# Patient Record
Sex: FEMALE | Race: WHITE | NOT HISPANIC OR LATINO | Employment: UNEMPLOYED | URBAN - METROPOLITAN AREA
[De-identification: names, ages, dates, MRNs, and addresses within clinical notes are randomized per-mention and may not be internally consistent; named-entity substitution may affect disease eponyms.]

---

## 2017-03-02 ENCOUNTER — TELEPHONE (OUTPATIENT)
Dept: FAMILY MEDICINE | Facility: CLINIC | Age: 52
End: 2017-03-02

## 2017-03-02 NOTE — TELEPHONE ENCOUNTER
Writer asked by Dr. Koenig to contact patient regarding tomorrow's appt:  1. Dr. Koenig is happy to see patient and give referral to psych but Dr. Koenig does not prescribe Klonopin  2. Dr. Koenig would like records from previous provider(s) who prescribed Klonopin faxed to her    Called patient's home number and person who answered phone informed writer patient is staying at her mother's and writer may try to reach her there: [phone].    Called that number and patient answered phone.    Writer reviewed message per Dr. Koenig.    Patient verbalized understanding and stated:  1. She has enough Klonopin  2. What she needs refill of is Paxil to get her through until she can see psychiatry  3. Unsure if she will be able to get records transferred to Dr. Koenig prior to tomorrow's appt.  Was informed Dr. Koenig could probably look records up during office visit after she signs release    Writer explained to patient, yes, if previous health system is part of Care Everywhere, could likely access those records but Dr. Koenig would prefer records prior to appointment for review.    Reviewed clinic fax number with patient.    Dr. Arya Hilario, FBAIOLAN, RN

## 2017-03-03 ENCOUNTER — OFFICE VISIT (OUTPATIENT)
Dept: FAMILY MEDICINE | Facility: CLINIC | Age: 52
End: 2017-03-03
Payer: COMMERCIAL

## 2017-03-03 ENCOUNTER — OFFICE VISIT (OUTPATIENT)
Dept: BEHAVIORAL HEALTH | Facility: CLINIC | Age: 52
End: 2017-03-03
Payer: COMMERCIAL

## 2017-03-03 VITALS
RESPIRATION RATE: 16 BRPM | OXYGEN SATURATION: 96 % | SYSTOLIC BLOOD PRESSURE: 126 MMHG | DIASTOLIC BLOOD PRESSURE: 84 MMHG | HEART RATE: 99 BPM | TEMPERATURE: 99.1 F | HEIGHT: 62 IN | WEIGHT: 125.25 LBS | BODY MASS INDEX: 23.05 KG/M2

## 2017-03-03 DIAGNOSIS — F41.0 PANIC DISORDER WITHOUT AGORAPHOBIA: ICD-10-CM

## 2017-03-03 DIAGNOSIS — F17.200 TOBACCO USE DISORDER: ICD-10-CM

## 2017-03-03 DIAGNOSIS — Z23 NEED FOR TDAP VACCINATION: ICD-10-CM

## 2017-03-03 DIAGNOSIS — F33.9 RECURRENT MAJOR DEPRESSIVE DISORDER, REMISSION STATUS UNSPECIFIED (H): Primary | ICD-10-CM

## 2017-03-03 DIAGNOSIS — F34.1 CHRONIC DEPRESSIVE PERSONALITY DISORDER: ICD-10-CM

## 2017-03-03 DIAGNOSIS — F42.9 OCD (OBSESSIVE COMPULSIVE DISORDER): Primary | ICD-10-CM

## 2017-03-03 PROBLEM — F41.1 GAD (GENERALIZED ANXIETY DISORDER): Status: ACTIVE | Noted: 2017-03-03

## 2017-03-03 PROCEDURE — 99207 ZZC NO CHARGE BEHAVIORAL WARM HANDOFF: CPT | Performed by: SOCIAL WORKER

## 2017-03-03 PROCEDURE — 99204 OFFICE O/P NEW MOD 45 MIN: CPT | Performed by: FAMILY MEDICINE

## 2017-03-03 RX ORDER — PAROXETINE 20 MG/1
20 TABLET, FILM COATED ORAL AT BEDTIME
Qty: 90 TABLET | Refills: 0 | Status: ON HOLD | OUTPATIENT
Start: 2017-03-03 | End: 2019-01-10

## 2017-03-03 RX ORDER — CLONAZEPAM 0.5 MG/1
0.5 TABLET ORAL DAILY
Qty: 20 TABLET | Refills: 0 | COMMUNITY
Start: 2017-03-03 | End: 2017-03-23

## 2017-03-03 ASSESSMENT — ANXIETY QUESTIONNAIRES
6. BECOMING EASILY ANNOYED OR IRRITABLE: NOT AT ALL
3. WORRYING TOO MUCH ABOUT DIFFERENT THINGS: NOT AT ALL
IF YOU CHECKED OFF ANY PROBLEMS ON THIS QUESTIONNAIRE, HOW DIFFICULT HAVE THESE PROBLEMS MADE IT FOR YOU TO DO YOUR WORK, TAKE CARE OF THINGS AT HOME, OR GET ALONG WITH OTHER PEOPLE: NOT DIFFICULT AT ALL
GAD7 TOTAL SCORE: 0
5. BEING SO RESTLESS THAT IT IS HARD TO SIT STILL: NOT AT ALL
1. FEELING NERVOUS, ANXIOUS, OR ON EDGE: NOT AT ALL
7. FEELING AFRAID AS IF SOMETHING AWFUL MIGHT HAPPEN: NOT AT ALL
2. NOT BEING ABLE TO STOP OR CONTROL WORRYING: NOT AT ALL

## 2017-03-03 ASSESSMENT — PATIENT HEALTH QUESTIONNAIRE - PHQ9: 5. POOR APPETITE OR OVEREATING: NOT AT ALL

## 2017-03-03 NOTE — PROGRESS NOTES
"  SUBJECTIVE:                                                    Gwen Cash is a 52 year old female who presents to clinic today for the following health issues:    Depression and Anxiety Follow-Up    Status since last visit: Patient is new to us here at [facility]. Patient has been previously getting care from [facility] . It used to be Dr. Jillian MD who retired in 04/2016  at [employer].    Since last visit with Dr. Jillian MD patient has had no changes it has been the same     Other associated symptoms:None/ Patient recently is mourning the loss of a close relative    Complicating factors:     Significant life event: Yes-  Loss of a close relative     Current substance abuse: None    PHQ-9 SCORE 3/3/2017   Total Score 0     AQUILINO-7 SCORE 3/3/2017   Total Score 0        PHQ-9  English      PHQ-9   Any Language     GAD7     Reports has the OCD type of depression and anxiety. Long standing diagnosed in the 1990's.   Hospitalized 2 to 3 times for it. Never attempted suicide , last hospitalized in 2004.   Had obsessions about hemorrhaging and dying. She also had been sleeping excessively and noted a decrease in energy. She lost three jobs because of inability to get up on time. Subsequently she was hospitalized on July 14th until the end of August 2004 at [facility]. She had stopped eating. She had become increasingly engrossed in her obsessions about hemorrhaging. Menses had regularized.      Notes from 2004 discharge state \"This woman was admitted to the closed, locked psychiatric section for depression, inability to focus, poor concentration, and failure to thrive.  She was treated with group therapy, individual therapy, occupational therapy, and borrero activities.  For much of the hospitalization, she was distressed, in significant despair, felt hopelessness, and the inability to cope.  She was tried on several different medications, none of which seemed very helpful for her.  She had " "multiple symptoms of side effects from many different medications at small doses.  She was seen by neurology for paresthesias of her mouth, and they   thought this was hyperventilation.  Her EEG and MRI turned out to be normal.  At time of discharge, she was taking Risperdal 0.5 mg at bedtime, Klonopin 0.5 mg in the morning and bedtime, and she was prescribed Cymbalta 30 mg, but was hesitant to take this because of side effects.  She would be seen by her outpatient physician for medication management.  She was not interested in day hospital or outpatient psychotherapy. Chemistry screening profile was all within the normal limits.  Drugs of abuse were negative on screen.  Urinalysis was clear. Hematology was all within the normal limits.  MRI of the brain was a normal scan.  DISCHARGE DIAGNOSES  AXIS I:  1.  Depression, severe, chronic, recurrent. 2.  Anxiety, severe, recurrent.\"    Per her regular psychiatrist notes Her anxiety improved in the hospital and the Paxil was actually cut back to 20 mg.    Last visit with Dr Walsh her psychiatrist at [facility] was 1/2016  Before he retired. He stated.\"She continues to be free of obsessions and compulsions and panic. She takes Paxil 20 mg regularly together with Klonopin 0.5 mg every afternoon. We note that she had hives with generic Paxil so it is written d.a.w. we also note that while hospitalized in 2004 she went off Klonopin, possibly also the Paxil, for 3-4 weeks and experienced a heightened level of very uncomfortable anxiety. She's been back on the Paxil and Klonopin at current doses ever since then  history of very severe anxiety in the context of hyperemesis gravidarum 21 years ago with subsequent OCD symptoms, all of which are in long-term remission with low-dose Paxil and Klonopin. One attempt to take her off the Klonopin (possibly also the Paxil) he'll did excessive anxiety so she has remained on both drugs. She has not overused any of them. Because of " "her history of severe anxiety it would be well for her to be followed by psychiatrist even though she is stable\"     Has been stable on Paxil 20 mg brand name only & clonazepam 0.5 mg daily many years. Didn't have regular medical primary care , would go in for urgent things ,unsure about last pap etc. Will come back for that at a later time. Currently just wants her mental health addressed. Was under care long time with dr garner psychiatrist at [facility] at point when would just see him once a year in April. But he retired since she last saw him and was assigned another provider. A series of events occurred and aunt  tried to reschedule didn't call on time reported as no show and denied further care. This was unexpected. Called live nurse on medica choice and made apt for today to get referral to a psychiatrist. Declines need for therapist. Ran out of Paxil 2 days ago, despite aunt passing PHQ9 & shira =0 each as stable on med's. Has some klonopin left and does not need that refilled . Hopes to get in with psyche soon.     Looks like PA for brand name Paxil went through to [facility]'s on 17 via [facility] but was only given a month till could establish with new BH person.     On care every where \"This patient's Psychiatric care has been terminated on 2017 due to failure to keep appointments and failed intake. Please direct the patient to the Customer Service number on the back of their insurance card for other options outside of [place]\"    Reports hx of kidney stone in . Last had like a physical then . May have had a pap later. She signed a release and will review records on care everywhere later after apt when have more time.     Declines labs or tdap today will come back for a preventive visit.     Is currently unemployed, not disabled from  Her mental illness. Lives with  and 2 of her 3 grown children.currently spends most of her days at her mothers helping her as " mother not doing so well & forgetful.       Amount of exercise or physical activity: walking around and activities like that    Problems taking medications regularly: No/ Patient just ended up running out of Paxil and has missed for two day .     Medication side effects: patient did have a side effect to generic Paxil so patient is requesting a JABARI    Patient broke out in hives to the generic Paxil  Diet: regular (no restrictions)    Patient would like a referral to Mental Health  Also asking about natural foods that would help with pre-menopause/menopause symptoms     Problem list and histories reviewed & adjusted, as indicated.  Additional history: as documented    Patient Active Problem List   Diagnosis     OCD (obsessive compulsive disorder)     Panic disorder without agoraphobia     Tobacco use disorder     Chronic depressive personality disorder     Past Surgical History   Procedure Laterality Date      section       x 3     Lithotripsy         Social History   Substance Use Topics     Smoking status: Current Every Day Smoker     Packs/day: 0.50     Years: 30.00     Smokeless tobacco: Never Used     Alcohol use No     Family History   Problem Relation Age of Onset     Coronary Artery Disease Mother      Cataracts Mother      Alcoholism Father      alcohol cardiomyopathy     Alcoholism Brother      Arthritis Brother      Depression Brother      Coronary Artery Disease Brother      Hypertension Brother          Current Outpatient Prescriptions   Medication Sig Dispense Refill     PARoxetine (PAXIL) 20 MG tablet Take 1 tablet (20 mg) by mouth At Bedtime 90 tablet 0     clonazePAM (KLONOPIN) 0.5 MG tablet Take 1 tablet (0.5 mg) by mouth daily 20 tablet 0     Allergies   Allergen Reactions     Metronidazole Unknown     Abstracted data , patient cannot remember      Penicillins Unknown     Abstracted data , patient cannot remember      No lab results found.   BP Readings from Last 3 Encounters:  "  03/03/17 126/84    Wt Readings from Last 3 Encounters:   03/03/17 125 lb 4 oz (56.8 kg)         Labs reviewed in EPIC    Reviewed and updated as needed this visit by clinical staff  Tobacco  Allergies  Meds  Problems  Med Hx  Surg Hx  Fam Hx  Soc Hx        Reviewed and updated as needed this visit by Provider  Tobacco  Allergies  Meds  Problems  Soc Hx        ROS:  C: NEGATIVE for fever, chills, change in weight  I: NEGATIVE for worrisome rashes, moles or lesions  E: NEGATIVE for vision changes or irritation  E/M: NEGATIVE for ear, mouth and throat problems  R: NEGATIVE for significant cough or SOB  B: NEGATIVE for masses, tenderness or discharge  CV: NEGATIVE for chest pain, palpitations or peripheral edema  GI: NEGATIVE for nausea, abdominal pain, heartburn, or change in bowel habits  : NEGATIVE for frequency, dysuria, or hematuria  M: NEGATIVE for significant arthralgias or myalgia  N: NEGATIVE for weakness, dizziness or paresthesias  E: NEGATIVE for temperature intolerance, skin/hair changes  H: NEGATIVE for bleeding problems  P: NEGATIVE for changes in mood or affect    OBJECTIVE:                                                    /84  Pulse 99  Temp 99.1  F (37.3  C) (Tympanic)  Resp 16  Ht 5' 2.25\" (1.581 m)  Wt 125 lb 4 oz (56.8 kg)  SpO2 96%  BMI 22.72 kg/m2  Body mass index is 22.72 kg/(m^2).  GENERAL: healthy, alert and no distress  EYES: Eyes grossly normal to inspection, PERRL and conjunctivae and sclerae normal  HENT: ear canals and TM's normal, nose and mouth without ulcers or lesions  NECK: no adenopathy, no asymmetry, masses, or scars and thyroid normal to palpation  RESP: lungs clear to auscultation - no rales, rhonchi or wheezes  CV: regular rate and rhythm, normal S1 S2, no S3 or S4, no murmur, click or rub, no peripheral edema and peripheral pulses strong  ABDOMEN: soft, non tender, no hepatosplenomegaly, no masses and bowel sounds normal  MS: no gross musculoskeletal " defects noted, no edema  SKIN: no suspicious lesions or rashes  NEURO: Normal strength and tone, mentation intact and speech normal  PSYCH: mentation appears normal, affect normal/bright  Gait and station are normal. No abnormalities of muscle strength or tone are appreciated Speech normal in rate and rhythm. Oriented x3.Thought process logical. Associations intact. Attention and concentration normal. Mood and affect euthymic, no hallucinations, no delusions. No suicidal ideation.     Diagnostic Test Results:  No results found for this or any previous visit (from the past 24 hour(s)).     ASSESSMENT/PLAN:                                                      1. OCD (obsessive compulsive disorder)  New to me , seen once in 2004 for severe depression and admitted, not seen here since. Limited apt time and unable to review any records prior to apt. Did [facility] in detail [facility] via care everywhere from her psychiatrist and sporadic primary and gyn visits has had prior C sections, under care of gyn in past for menometrorrhagia. [place] prescription monitoring review shows has been receiving klonopin # 30 monthly last on 2/7 by a dr isabell Chisholm. Here to establish care and get Paxil refilled until can be seen by a psychiatrist. Patient signed a release so we could get records, these were reviewed after the visit in detail. Declines Labs today will do in future. Consider tdap.Return for preventive with mammogram and pap etc. Wants to wait till issue with psychiatric care all taken care of first. counseled I don T prescribe chronic benzo's can give temp if need to if delay in getting in to psychiatrist. Will refer to psychiatrist so they can take better care of you and take over all your mental health med management including Paxil. She continues to be free of obsessions and compulsions and panic. She takes Paxil 20 mg regularly together with Klonopin 0.5 mg every afternoon. She had hives with generic Paxil so  it is written d.a.w. History of very severe anxiety in the context of hyperemesis gravidarum in 1996 with subsequent OCD symptoms, all of which are in long-term remission with low-dose Paxil and Klonopin. One attempt to take her off the Klonopin (possibly also the Paxil while hospitalized in 2004 caused excessive anxiety so she has remained on both drugs ever since. She has not overused any of them. Because of her history of severe anxiety her previous psychiatrist suggested it be well for her to be followed by psychiatrist even though she is stable    - MENTAL HEALTH REFERRAL  - PARoxetine (PAXIL) 20 MG tablet; Take 1 tablet (20 mg) by mouth At Bedtime  Dispense: 90 tablet; Refill: 0  - clonazePAM (KLONOPIN) 0.5 MG tablet; Take 1 tablet (0.5 mg) by mouth daily  Dispense: 20 tablet; Refill: 0  - MENTAL HEALTH REFERRAL    2. Panic disorder without agoraphobia  As above  - MENTAL HEALTH REFERRAL  - clonazePAM (KLONOPIN) 0.5 MG tablet; Take 1 tablet (0.5 mg) by mouth daily  Dispense: 20 tablet; Refill: 0  - MENTAL HEALTH REFERRAL    3. Chronic depressive personality disorder  PHq9 & Shiv = 0     4. Tobacco use disorder  Continues to smoke not ready to quit yet.     5. Need for Tdap vaccination  Declines today plans to get with labs at a preventive visit in the future    See Patient Instructions    Graciela Koenig MD  [facility]

## 2017-03-03 NOTE — PROGRESS NOTES
Warm-handoff      Patient had appointment with his/her primary care physician. [facility] services were requested. Explained the role of the [facility] and provided informational handout and contact information for the [facility].   The patient is establishing care at the [email or web address] The patient has a chronic history of depression. [facility] services were requested. Patient reports she has worked with a psychiatrist at [facility] for over 20 years but was terminated recently due to 2 failed appointments. Patient reports one of them was due to the  of an aunt. Patient reports she is currently stable at the present time. Patient's PHQ-9 and AQUILINO 7 was 0. Provide information to patient about the IB model and the [facility]. Provided patient [facility] card for further resources or counseling.

## 2017-03-03 NOTE — PATIENT INSTRUCTIONS
Sign a release so we can get records  Labs in future   Consider tdap  Return for preventive with mammogram and pap etc  I don t prescribe chronic benzos can give temp if need to if delay in getting in to psychiatrist   Will refer to psychiatrist so they can take better care of you and take over all your mental health med management including paxil

## 2017-03-03 NOTE — MR AVS SNAPSHOT
"              After Visit Summary   3/3/2017    Gwen Cash    MRN: [id number]           Patient Information     Date Of Birth          1965        Visit Information        Provider Department      3/3/2017 3:43 PM [facility]        Today's Diagnoses     Recurrent major depressive disorder, remission status unspecified (H)    -  1       Follow-ups after your visit        Who to contact     If you have questions or need follow up information about today's clinic visit or your schedule please contact [facility] directly at [phone].  Normal or non-critical lab and imaging results will be communicated to you by FitLinxxhart, letter or phone within 4 business days after the clinic has received the results. If you do not hear from us within 7 days, please contact the clinic through FitLinxxhart or phone. If you have a critical or abnormal lab result, we will notify you by phone as soon as possible.  Submit refill requests through Imagimod or call your pharmacy and they will forward the refill request to us. Please allow 3 business days for your refill to be completed.          Additional Information About Your Visit        MyChart Information     Imagimod lets you send messages to your doctor, view your test results, renew your prescriptions, schedule appointments and more. To sign up, go to [email or web address] . Click on \"Log in\" on the left side of the screen, which will take you to the Welcome page. Then click on \"Sign up Now\" on the right side of the page.     You will be asked to enter the access code listed below, as well as some personal information. Please follow the directions to create your username and password.     Your access code is: [email or web address]  Expires: 2017  3:26 PM     Your access code will  in 90 days. If you need help or a new code, please call your [facility] or [phone].        Care EveryWhere ID     This is your Care " EveryWhere ID. This could be used by other organizations to access your [place] medical records  [id number]         Blood Pressure from Last 3 Encounters:   03/03/17 126/84    Weight from Last 3 Encounters:   03/03/17 56.8 kg (125 lb 4 oz)              Today, you had the following     No orders found for display         Today's Medication Changes          These changes are accurate as of: 3/3/17  3:45 PM.  If you have any questions, ask your nurse or doctor.               Start taking these medicines.        Dose/Directions    PARoxetine 20 MG tablet   Commonly known as:  PAXIL   Used for:  AQUILINO (generalized anxiety disorder)   Started by:  Graciela Koenig MD        Dose:  20 mg   Take 1 tablet (20 mg) by mouth At Bedtime   Quantity:  90 tablet   Refills:  0            Where to get your medicines      These medications were sent to [facility] [address] [place] [address] AT [address] [address]    Hours:  24-hours Phone:  [phone]     PARoxetine 20 MG tablet                Primary Care Provider    None Specified       No primary provider on file.        Thank you!     Thank you for choosing [facility]  for your care. Our goal is always to provide you with excellent care. Hearing back from our patients is one way we can continue to improve our services. Please take a few minutes to complete the written survey that you may receive in the mail after your visit with us. Thank you!             Your Updated Medication List - Protect others around you: Learn how to safely use, store and throw away your medicines at www.disposemymeds.org.          This list is accurate as of: 3/3/17  3:45 PM.  Always use your most recent med list.                   Brand Name Dispense Instructions for use    clonazePAM 0.5 MG tablet    klonoPIN    20 tablet    Take 1 tablet (0.5 mg) by mouth daily       PARoxetine 20 MG tablet    PAXIL    90  tablet    Take 1 tablet (20 mg) by mouth At Bedtime

## 2017-03-03 NOTE — MR AVS SNAPSHOT
After Visit Summary   3/3/2017    Gwen Cash    MRN: [id number]           Patient Information     Date Of Birth          1965        Visit Information        Provider Department      3/3/2017 2:40 PM Graciela Koenig MD [facility]        Today's Diagnoses     Recurrent major depressive disorder, remission status unspecified (H)    -  1    AQUILINO (generalized anxiety disorder)        Need for Tdap vaccination          Care Instructions    Sign a release so we can get records  Labs in future   Consider tdap  Return for preventive with mammogram and pap etc  I don t prescribe chronic benzos can give temp if need to if delay in getting in to psychiatrist   Will refer to psychiatrist so they can take better care of you and take over all your mental health med management including paxil        Follow-ups after your visit        Additional Services     MENTAL HEALTH REFERRAL       Your provider has referred you to: Behavioral Healthcare Providers Intake Scheduling [phone]   [email or web address] [email or web address] for med management     All scheduling is subject to the client's specific insurance plan & benefits, provider/location availability, and provider clinical specialities.  Please arrive 15 minutes early for your first appointment and bring your completed paperwork.    Please be aware that coverage of these services is subject to the terms and limitations of your health insurance plan.  Call member services at your health plan with any benefit or coverage questions.            MENTAL HEALTH REFERRAL       Your provider has referred you to: Behavioral Healthcare Providers Intake Scheduling [phone]   [email or web address] [email or web address] needs community psychiatrist   Please trae her mothers number as currently helping mom out   Number is [phone]     All scheduling is subject to the client's specific insurance plan & benefits, provider/location availability, and provider clinical  "specialities.  Please arrive 15 minutes early for your first appointment and bring your completed paperwork.    Please be aware that coverage of these services is subject to the terms and limitations of your health insurance plan.  Call member services at your health plan with any benefit or coverage questions.                  Who to contact     If you have questions or need follow up information about today's clinic visit or your schedule please contact [facility] directly at [phone].  Normal or non-critical lab and imaging results will be communicated to you by [email or web address], letter or phone within 4 business days after the clinic has received the results. If you do not hear from us within 7 days, please contact the clinic through [facility] or phone. If you have a critical or abnormal lab result, we will notify you by phone as soon as possible.  Submit refill requests through SysClass or call your pharmacy and they will forward the refill request to us. Please allow 3 business days for your refill to be completed.          Additional Information About Your Visit        SysClass Information     SysClass lets you send messages to your doctor, view your test results, renew your prescriptions, schedule appointments and more. To sign up, go to [email or web address] . Click on \"Log in\" on the left side of the screen, which will take you to the Welcome page. Then click on \"Sign up Now\" on the right side of the page.     You will be asked to enter the access code listed below, as well as some personal information. Please follow the directions to create your username and password.     Your access code is: [email or web address]  Expires: 2017  3:26 PM     Your access code will  in 90 days. If you need help or a new code, please call your [facility] clinic or [phone].        Care EveryWhere ID     This is your Care EveryWhere ID. This could be used by other organizations to access your [facility] medical " "records  [id number]        Your Vitals Were     Pulse Temperature Respirations Height Pulse Oximetry BMI (Body Mass Index)    99 99.1  F (37.3  C) (Tympanic) 16 5' 2.25\" (1.581 m) 96% 22.72 kg/m2       Blood Pressure from Last 3 Encounters:   03/03/17 126/84    Weight from Last 3 Encounters:   03/03/17 125 lb 4 oz (56.8 kg)              We Performed the Following     MENTAL HEALTH REFERRAL     MENTAL HEALTH REFERRAL          Today's Medication Changes          These changes are accurate as of: 3/3/17  3:27 PM.  If you have any questions, ask your nurse or doctor.               Start taking these medicines.        Dose/Directions    PARoxetine 20 MG tablet   Commonly known as:  PAXIL   Used for:  AQUILINO (generalized anxiety disorder)   Started by:  Graciela Koenig MD        Dose:  20 mg   Take 1 tablet (20 mg) by mouth At Bedtime   Quantity:  90 tablet   Refills:  0            Where to get your medicines      These medications were sent to [facility] [address] [place] [address] AT [address] [address]    Hours:  24-hours Phone:  [phone]     PARoxetine 20 MG tablet                Primary Care Provider    None Specified       No primary provider on file.        Thank you!     Thank you for choosing [facility]  for your care. Our goal is always to provide you with excellent care. Hearing back from our patients is one way we can continue to improve our services. Please take a few minutes to complete the written survey that you may receive in the mail after your visit with us. Thank you!             Your Updated Medication List - Protect others around you: Learn how to safely use, store and throw away your medicines at www.disposemymeds.org.          This list is accurate as of: 3/3/17  3:27 PM.  Always use your most recent med list.                   Brand Name Dispense Instructions for use    clonazePAM 0.5 MG tablet    " klonoPIN    20 tablet    Take 1 tablet (0.5 mg) by mouth daily       PARoxetine 20 MG tablet    PAXIL    90 tablet    Take 1 tablet (20 mg) by mouth At Bedtime

## 2017-03-04 PROBLEM — F17.200 TOBACCO USE DISORDER: Status: ACTIVE | Noted: 2017-03-04

## 2017-03-04 PROBLEM — F42.9 OCD (OBSESSIVE COMPULSIVE DISORDER): Status: ACTIVE | Noted: 2017-03-04

## 2017-03-04 PROBLEM — F41.0 PANIC DISORDER WITHOUT AGORAPHOBIA: Status: ACTIVE | Noted: 2017-03-04

## 2017-03-04 ASSESSMENT — PATIENT HEALTH QUESTIONNAIRE - PHQ9: SUM OF ALL RESPONSES TO PHQ QUESTIONS 1-9: 0

## 2017-03-04 ASSESSMENT — ANXIETY QUESTIONNAIRES: GAD7 TOTAL SCORE: 0

## 2017-03-06 ENCOUNTER — TELEPHONE (OUTPATIENT)
Dept: FAMILY MEDICINE | Facility: CLINIC | Age: 52
End: 2017-03-06

## 2017-03-06 NOTE — TELEPHONE ENCOUNTER
----- Message from Caroline Harrison sent at 3/6/2017 10:25 AM CST -----  Regarding: OUTPATIENT MENTAL HEALTH ORDER  An attempt has been made to contact the patient. A letter has been sent advising the patient to contact [facility] due to the patient not having a correct phone number in EPIC or working messaging system.    Caroline CASTRO  , [facility]

## 2017-03-23 ENCOUNTER — TELEPHONE (OUTPATIENT)
Dept: FAMILY MEDICINE | Facility: CLINIC | Age: 52
End: 2017-03-23

## 2017-03-23 DIAGNOSIS — F42.9 OCD (OBSESSIVE COMPULSIVE DISORDER): ICD-10-CM

## 2017-03-23 DIAGNOSIS — F41.0 PANIC DISORDER WITHOUT AGORAPHOBIA: ICD-10-CM

## 2017-03-23 RX ORDER — CLONAZEPAM 0.5 MG/1
0.5 TABLET ORAL DAILY
Qty: 15 TABLET | Refills: 0 | Status: ON HOLD | OUTPATIENT
Start: 2017-03-23 | End: 2019-01-10

## 2017-03-23 NOTE — TELEPHONE ENCOUNTER
Will give 15 pills of klonopin. No further refills. Was told to call and make apt with psyche when seen 3/3

## 2017-03-23 NOTE — TELEPHONE ENCOUNTER
The patient is calling to request a Klonopin prescription from Dr. Koenig. The patient has yet to schedule with psychiatry but is planning on calling them soon.  The patient is staying with her mother and can be reached at .

## 2017-03-23 NOTE — TELEPHONE ENCOUNTER
Dr. Koenig,  --I called patient and gave her 's message.  --Patient says she does not know how fast she will be able to get into psych because there was some sort of mix up with them trying to reach her.  --She is concerned she will not be able to get into  psych before her klonopin runs out.  --She is on phone now trying to schedule with psych.  --I told her that refills for controlled medications are usually given in office visit only.    --She wants to know where she should  her Klonopin.  --I see it was printed but I don't see the pharmacy.    --MA - please let patient know when script moved to pharmacy.    Yadira Nelson RN

## 2017-06-02 ENCOUNTER — TELEPHONE (OUTPATIENT)
Dept: FAMILY MEDICINE | Facility: CLINIC | Age: 52
End: 2017-06-02

## 2017-06-02 NOTE — TELEPHONE ENCOUNTER
Attempt #2    Looks like pt phone is incorrect. Will be sending a letter for her instead.   Fanta Small MA

## 2017-06-02 NOTE — LETTER
[address] [address]  Phone: [phone]    May 24, 2017      Gwen Cash  [address] [facility] [address]      Dear Gwen Cash:    As part of [facility]'s commitment to health and wellness, we inform our patient when records indicate the need for specific health screening.  Please review the following health screening recommendations based on your age:                 Ages 20-40:  Annual physical that includes a breast exam, pelvic exam and possibly a pap smear and other lab work.                 Ages 40-75:  Annual physical that includes a breast exam, pelvic exam and possibly a pap smear and other lab work.  You also need to obtain a screening mammogram every year starting at age 40. At age 50, it is recommended that you be screened for colon cancer with a colonoscopy.  If your initial colonoscopy is negative, you probably won't need another one for 10 years.    Please bring a list of your current medications with you to your appointment.  If you will need refills prior to your appointment, please have your pharmacy fax a refill request to the appropriate provider; this helps to ensure that the correct medication and dose are ordered.    To schedule an appointment with a [facility]'s  physician or nurse practitioner, please call:[phone].     NOTE:  Please disregard this notice if you have already scheduled your health maintenance exam(s) or if you have been given different instructions from your health care provider.         Sincerely,    [facility] Health Care Team

## 2018-11-16 ENCOUNTER — HOSPITAL ENCOUNTER (INPATIENT)
Facility: CLINIC | Age: 53
LOS: 56 days | Discharge: HOME | DRG: 885 | End: 2019-01-11
Attending: EMERGENCY MEDICINE | Admitting: PSYCHIATRY & NEUROLOGY
Payer: COMMERCIAL

## 2018-11-16 DIAGNOSIS — F42.9 OCD (OBSESSIVE COMPULSIVE DISORDER): Primary | ICD-10-CM

## 2018-11-16 DIAGNOSIS — F42.2 MIXED OBSESSIONAL THOUGHTS AND ACTS: ICD-10-CM

## 2018-11-16 DIAGNOSIS — F41.0 PANIC DISORDER WITHOUT AGORAPHOBIA: ICD-10-CM

## 2018-11-16 DIAGNOSIS — F34.1 CHRONIC DEPRESSIVE PERSONALITY DISORDER: ICD-10-CM

## 2018-11-16 DIAGNOSIS — E03.9 HYPOTHYROIDISM, UNSPECIFIED TYPE: ICD-10-CM

## 2018-11-16 DIAGNOSIS — F33.2 SEVERE RECURRENT MAJOR DEPRESSION WITHOUT PSYCHOTIC FEATURES (H): ICD-10-CM

## 2018-11-16 DIAGNOSIS — F32.9 MAJOR DEPRESSIVE DISORDER, REMISSION STATUS UNSPECIFIED, UNSPECIFIED WHETHER RECURRENT: ICD-10-CM

## 2018-11-16 PROBLEM — F32.A DEPRESSION: Status: ACTIVE | Noted: 2018-11-16

## 2018-11-16 LAB
ALBUMIN SERPL-MCNC: 4.9 G/DL (ref 3.4–5)
ALBUMIN UR-MCNC: NEGATIVE MG/DL
ALP SERPL-CCNC: 74 U/L (ref 40–150)
ALT SERPL W P-5'-P-CCNC: 17 U/L (ref 0–50)
ANION GAP SERPL CALCULATED.3IONS-SCNC: 9 MMOL/L (ref 3–14)
APPEARANCE UR: CLEAR
AST SERPL W P-5'-P-CCNC: 11 U/L (ref 0–45)
BASOPHILS # BLD AUTO: 0.1 10E9/L (ref 0–0.2)
BASOPHILS NFR BLD AUTO: 1.3 %
BILIRUB SERPL-MCNC: 0.4 MG/DL (ref 0.2–1.3)
BILIRUB UR QL STRIP: NEGATIVE
BUN SERPL-MCNC: 6 MG/DL (ref 7–30)
CALCIUM SERPL-MCNC: 9.5 MG/DL (ref 8.5–10.1)
CHLORIDE SERPL-SCNC: 103 MMOL/L (ref 94–109)
CO2 SERPL-SCNC: 29 MMOL/L (ref 20–32)
COLOR UR AUTO: NORMAL
CREAT SERPL-MCNC: 0.72 MG/DL (ref 0.52–1.04)
DIFFERENTIAL METHOD BLD: ABNORMAL
EOSINOPHIL # BLD AUTO: 0.1 10E9/L (ref 0–0.7)
EOSINOPHIL NFR BLD AUTO: 1.7 %
ERYTHROCYTE [DISTWIDTH] IN BLOOD BY AUTOMATED COUNT: 12.6 % (ref 10–15)
GFR SERPL CREATININE-BSD FRML MDRD: 84 ML/MIN/1.7M2
GLUCOSE SERPL-MCNC: 103 MG/DL (ref 70–99)
GLUCOSE UR STRIP-MCNC: NEGATIVE MG/DL
HCG UR QL: NEGATIVE
HCT VFR BLD AUTO: 46.3 % (ref 35–47)
HGB BLD-MCNC: 15.9 G/DL (ref 11.7–15.7)
HGB UR QL STRIP: NEGATIVE
IMM GRANULOCYTES # BLD: 0 10E9/L (ref 0–0.4)
IMM GRANULOCYTES NFR BLD: 0.1 %
KETONES UR STRIP-MCNC: NEGATIVE MG/DL
LEUKOCYTE ESTERASE UR QL STRIP: NEGATIVE
LYMPHOCYTES # BLD AUTO: 2.6 10E9/L (ref 0.8–5.3)
LYMPHOCYTES NFR BLD AUTO: 33.1 %
MAGNESIUM SERPL-MCNC: 2.4 MG/DL (ref 1.6–2.3)
MCH RBC QN AUTO: 30.6 PG (ref 26.5–33)
MCHC RBC AUTO-ENTMCNC: 34.3 G/DL (ref 31.5–36.5)
MCV RBC AUTO: 89 FL (ref 78–100)
MONOCYTES # BLD AUTO: 0.9 10E9/L (ref 0–1.3)
MONOCYTES NFR BLD AUTO: 11 %
NEUTROPHILS # BLD AUTO: 4.1 10E9/L (ref 1.6–8.3)
NEUTROPHILS NFR BLD AUTO: 52.8 %
NITRATE UR QL: NEGATIVE
NRBC # BLD AUTO: 0 10*3/UL
NRBC BLD AUTO-RTO: 0 /100
PH UR STRIP: 6.5 PH (ref 5–7)
PHOSPHATE SERPL-MCNC: 3.5 MG/DL (ref 2.5–4.5)
PLATELET # BLD AUTO: 338 10E9/L (ref 150–450)
POTASSIUM SERPL-SCNC: 3.8 MMOL/L (ref 3.4–5.3)
PROT SERPL-MCNC: 8.3 G/DL (ref 6.8–8.8)
RBC # BLD AUTO: 5.19 10E12/L (ref 3.8–5.2)
SODIUM SERPL-SCNC: 141 MMOL/L (ref 133–144)
SOURCE: NORMAL
SP GR UR STRIP: 1 (ref 1–1.03)
T4 FREE SERPL-MCNC: 0.93 NG/DL (ref 0.76–1.46)
TSH SERPL DL<=0.005 MIU/L-ACNC: 5.89 MU/L (ref 0.4–4)
UROBILINOGEN UR STRIP-MCNC: NORMAL MG/DL (ref 0–2)
WBC # BLD AUTO: 7.8 10E9/L (ref 4–11)

## 2018-11-16 PROCEDURE — 84443 ASSAY THYROID STIM HORMONE: CPT | Performed by: EMERGENCY MEDICINE

## 2018-11-16 PROCEDURE — 80053 COMPREHEN METABOLIC PANEL: CPT | Performed by: EMERGENCY MEDICINE

## 2018-11-16 PROCEDURE — 99284 EMERGENCY DEPT VISIT MOD MDM: CPT | Mod: Z6 | Performed by: EMERGENCY MEDICINE

## 2018-11-16 PROCEDURE — 90791 PSYCH DIAGNOSTIC EVALUATION: CPT

## 2018-11-16 PROCEDURE — 85025 COMPLETE CBC W/AUTO DIFF WBC: CPT | Performed by: EMERGENCY MEDICINE

## 2018-11-16 PROCEDURE — 84439 ASSAY OF FREE THYROXINE: CPT | Performed by: EMERGENCY MEDICINE

## 2018-11-16 PROCEDURE — 12400007 ZZH R&B MH INTERMEDIATE UMMC

## 2018-11-16 PROCEDURE — 99223 1ST HOSP IP/OBS HIGH 75: CPT | Mod: AI | Performed by: PSYCHIATRY & NEUROLOGY

## 2018-11-16 PROCEDURE — 99285 EMERGENCY DEPT VISIT HI MDM: CPT | Mod: 25

## 2018-11-16 PROCEDURE — 81003 URINALYSIS AUTO W/O SCOPE: CPT | Performed by: EMERGENCY MEDICINE

## 2018-11-16 PROCEDURE — 84100 ASSAY OF PHOSPHORUS: CPT | Performed by: EMERGENCY MEDICINE

## 2018-11-16 PROCEDURE — 83735 ASSAY OF MAGNESIUM: CPT | Performed by: EMERGENCY MEDICINE

## 2018-11-16 PROCEDURE — 25000132 ZZH RX MED GY IP 250 OP 250 PS 637: Performed by: EMERGENCY MEDICINE

## 2018-11-16 PROCEDURE — 81025 URINE PREGNANCY TEST: CPT | Performed by: EMERGENCY MEDICINE

## 2018-11-16 RX ORDER — GABAPENTIN 300 MG/1
300 CAPSULE ORAL 3 TIMES DAILY PRN
Status: DISCONTINUED | OUTPATIENT
Start: 2018-11-16 | End: 2019-01-11 | Stop reason: HOSPADM

## 2018-11-16 RX ORDER — HYDROXYZINE HYDROCHLORIDE 25 MG/1
25 TABLET, FILM COATED ORAL EVERY 4 HOURS PRN
Status: DISCONTINUED | OUTPATIENT
Start: 2018-11-16 | End: 2019-01-11 | Stop reason: HOSPADM

## 2018-11-16 RX ORDER — DOCUSATE SODIUM 100 MG/1
100 CAPSULE, LIQUID FILLED ORAL 2 TIMES DAILY PRN
Status: DISCONTINUED | OUTPATIENT
Start: 2018-11-16 | End: 2019-01-11 | Stop reason: HOSPADM

## 2018-11-16 RX ORDER — LEVOTHYROXINE SODIUM 25 UG/1
25 TABLET ORAL
Status: DISCONTINUED | OUTPATIENT
Start: 2018-11-17 | End: 2019-01-11 | Stop reason: HOSPADM

## 2018-11-16 RX ORDER — PAROXETINE 10 MG/1
20 TABLET, FILM COATED ORAL EVERY EVENING
Status: DISCONTINUED | OUTPATIENT
Start: 2018-11-16 | End: 2018-11-17

## 2018-11-16 RX ORDER — TRAZODONE HYDROCHLORIDE 50 MG/1
50 TABLET, FILM COATED ORAL
Status: DISCONTINUED | OUTPATIENT
Start: 2018-11-16 | End: 2019-01-11 | Stop reason: HOSPADM

## 2018-11-16 RX ORDER — LANOLIN ALCOHOL/MO/W.PET/CERES
3 CREAM (GRAM) TOPICAL AT BEDTIME
Status: DISCONTINUED | OUTPATIENT
Start: 2018-11-16 | End: 2018-11-16

## 2018-11-16 RX ORDER — CLONAZEPAM 0.5 MG/1
0.5 TABLET ORAL DAILY
Status: DISCONTINUED | OUTPATIENT
Start: 2018-11-16 | End: 2019-01-11 | Stop reason: HOSPADM

## 2018-11-16 RX ORDER — ACETAMINOPHEN 325 MG/1
325 TABLET ORAL EVERY 4 HOURS PRN
Status: DISCONTINUED | OUTPATIENT
Start: 2018-11-16 | End: 2018-12-10

## 2018-11-16 RX ORDER — RISPERIDONE 1 MG/1
1 TABLET ORAL AT BEDTIME
Status: DISCONTINUED | OUTPATIENT
Start: 2018-11-16 | End: 2018-11-19

## 2018-11-16 RX ORDER — MIRTAZAPINE 7.5 MG/1
7.5 TABLET, FILM COATED ORAL AT BEDTIME
Status: DISCONTINUED | OUTPATIENT
Start: 2018-11-16 | End: 2018-11-20

## 2018-11-16 RX ADMIN — CLONAZEPAM 0.5 MG: 0.5 TABLET ORAL at 16:51

## 2018-11-16 RX ADMIN — PAROXETINE HYDROCHLORIDE 20 MG: 10 TABLET, FILM COATED ORAL at 16:51

## 2018-11-16 ASSESSMENT — ACTIVITIES OF DAILY LIVING (ADL)
DRESS: INDEPENDENT
LAUNDRY: WITH SUPERVISION
ORAL_HYGIENE: INDEPENDENT
GROOMING: INDEPENDENT

## 2018-11-16 NOTE — PLAN OF CARE
Problem: Patient Care Overview  Goal: Team Discussion  Team Plan:   BEHAVIORAL TEAM DISCUSSION    Participants: Wei Powell MD, Janey FLORES and Constanza MCKEON RN   Progress: Initial behavioral team discussion.   Continued Stay Criteria/Rationale: Pt admitted voluntary due to family suggestion she seek help.   Medical/Physical: See medical consult, if applicable   Precautions:   Behavioral Orders   Procedures     Code 1 - Restrict to Unit     Routine Programming     As clinically indicated     Status 15     Every 15 minutes.     Plan: The plan is to assess the patient for mental health and medication needs.  The patient will be prescribed medications to treat the identified symptoms.  Upon discharge the patient will be referred to services as appropriate based on the assessment.  Rationale for change in precautions or plan: No change, initial plan of care.

## 2018-11-16 NOTE — PROGRESS NOTES
IN PATIENT ROOM: clothes, hair brush, toothbrush, 3 books, bottle of water    IN PATIENT LOCKER: CD, [place], slippers, tennis shoes, one hair brush, deodorant, body spray, fancy pen, beige winter coat, white gloves, cell phone with , 5 packs of cigarettes ( 2 are open), lighter, 's license, and Health insurance card    IN SECURITY: MEDICATIONS;  NO MONEY and NO CREDIT CARDS    ADMISSION:  I am responsible for any personal items that are not sent to the safe or pharmacy. [facility] is not responsible for loss, theft or damage of any property in my possession.    Patient Signature _____________________ Date/Time _____________________    Staff Signature _______________________ Date/Time _____________________  2nd Staff person, if patient is unable/unwilling to sign  ___________________________________ Date/Time _____________________  DISCHARGE:  All personal items have been returned to me.    Patient Signature _____________________ Date/Time _____________________    Staff Signature _______________________ Date/Time _____________________

## 2018-11-16 NOTE — PROGRESS NOTES
"CLINICAL NUTRITION SERVICES - ASSESSMENT NOTE     Nutrition Prescription    RECOMMENDATIONS FOR MDs/PROVIDERS TO ORDER:  Consider daily BMP/Mg/Phos x 3 days to ensure no refeeding occurs (given recent poor po intakes)    Malnutrition Status:    Severe malnutrition in the context of chronic illness    Recommendations already ordered by Registered Dietitian (RD):  Boost Plus/ meals    Future/Additional Recommendations:  Adjust supplements as needed pending pt preferences.     REASON FOR ASSESSMENT  Gwen Cash is a/an 53 year old female assessed by the dietitian for Provider Order - Patient has claimed to loose 30 lbs in the past couple months. Has been eating only one small meal a day. Would benefit from recommendations in increasing caloric intake    NUTRITION HISTORY  Chart review suggests reduced po intake with subsequent 30 lb weight loss since summer. Patient states she has had a reduced appetite lately and has not been eating much. Typical intake is 1 meal/day, however pt states this has been her pattern for some time. Just recently, her 1 meal has gotten smaller.    CURRENT NUTRITION ORDERS  Diet: Regular  Intake/Tolerance: Patient has been sleeping today, so she has not had breakfast or lunch. She is planning to eat dinner. She states she will be able to eat 3 meals tomorrow. She is agreeable to Boost Plus.    LABS  Labs reviewed  BUN 6 (L) - may be related to recent reduced protein intake    MEDICATIONS  Medications reviewed    ANTHROPOMETRICS  Height: 157.5 cm (5' 2\")  Most Recent Weight: 43.4 kg (95 lb 9.6 oz)    IBW: 59.8 kg  BMI: Underweight BMI <18.5  Weight History: UBW per pt of 125 lbs. She states that in Jan/Feb, she went to the doctor and was 119 lbs. This is when she realized she was losing weight. She feels weight loss has been more rapid in the past few months.  Wt Readings from Last 5 Encounters:   11/16/18 43.4 kg (95 lb 9.6 oz)   03/03/17 56.8 kg (125 lb 4 oz)     Dosing Weight: 43 " kg    ASSESSED NUTRITION NEEDS  Estimated Energy Needs: 6954-0033 kcals/day (30 - 35 kcals/kg )  Justification: Underweight  Estimated Protein Needs: 43-52 grams protein/day (1 - 1.2 grams of pro/kg)  Justification: Repletion  Estimated Fluid Needs: 1 mL/kcal or per MD goals   Justification: Maintenance    PHYSICAL FINDINGS  See malnutrition section below.     MALNUTRITION  % Intake: </=50% for >/= 1 month (severe)  % Weight Loss: > 20% in 1 year (severe)  Subcutaneous Fat Loss: Facial region:  moderate  Muscle Loss: Temporal, Facial & jaw region and Thoracic region (clavicle, acromium bone, deltoid, trapezius, pectoral):  All moderate/ severe  Fluid Accumulation/Edema: None noted  Malnutrition Diagnosis: Severe malnutrition in the context of chronic illness    NUTRITION DIAGNOSIS  Inadequate oral intake related to decreased appetite 2/2 altered mental status as evidenced by pt reports of poor po intake with subsequent weight loss.      INTERVENTIONS  Implementation  Nutrition Education: encouraged intake of regular meals to support adequate nutrition and weight repletion.   Medical food supplement therapy     Goals  Patient to consume % of nutritionally adequate meal trays TID, or the equivalent with supplements/snacks.     Monitoring/Evaluation  Progress toward goals will be monitored and evaluated per protocol.      Analilia Rivera, MS, RD, LD

## 2018-11-16 NOTE — CONSULTS
Internal Medicine Initial Visit      Gwen Cash MRN# [id number]   YOB: 1965 Age: 53 year old   Date of Admission: 11/16/2018  PCP: No Ref-Primary, Physician    Referring Provider: Behavioral Health - Wei Powell MD  Reason for Visit: General Medical Evaluation          Assessment and Recommendations:   Gwen Cash is a 53 year old year old woman with a history of OCD, anxiety, depression c/b 30lb weight loss due to insufficient calories who is admitted to station 10A for depression. Internal Medicine consultation was ordered by Dr. Wei Powell for general medical evaluation regarding abnormal labs and anorexia with low BMI 17.49.           OCD, anxiety, depression: Patient has a long term hx of OCD, anxiety and depression. Reports excessive tiredness, sleeping, poor appetite. She was maintained on PTA Paxil and Klonipin.  -Management per psychiatry team.     Tobacco abuse: Patient smokes 1/2 ppd for 38years. Denies any chronic cough, sputum production, hemoptysis or SOB.   -Encouraged cessation   -Patient not ready at this time  -Consider nicotine patch prn   -Encouraged lung cancer screening at age 55yo     Severe malnutrition in context of chronic illness  Low BMI and weight loss 2/2 insufficient caloric intake: Pt reports a 30lb weight loss since January likely 2/2 insufficient caloric intake. She reports that she eats 1 meal per day. She reports a hx of only eating 1 meal per day, however, she reports each meal has gotten smaller. Regular diet includes a sandwich, banana, and milk or a McDonalds burger, fries and soda. She notes that she does not have an appetite. Electrolyte including Mg, Phos without any acute abnormality. CBC negative for leukocytosis or anemia. Pt has been eating a small meal daily, therefore there is a lower risk for refeeding syndrome.   -Nutrition was consulted; appreciate recs   -Check daily BMP, Mg, Phos x3 days to assess for refeeding syndrome    -Boost plus with meals   -Recommend patient get outpatient screening colonoscopy and mammogram for  given weight loss and age to r/o malignant etiology      Elevated blood pressure: Pt initially hypertensive ~160/90 this AM. Repeat BP at 12:15 normalized to 119/68. Pt denies hx of HTN. She is currently asymptomatic.  -Will continue to trend BP  -Will hold antihypertensives at this time given normal BP  -However, If persistently elevated, will initiate BP medication    Subclinical hypothyroidism: Admission TSH 5.89, Free T4 0.93 indicating subclinical hypothyroidism. Given pt is depressed, recommended starting patient on Levothyroxine 25mcg per day. Pt was worried about starting the medication during our encounter, however, is willing to take medication. She reports difficulty taking oral pills.   -Start Levothyroxine 25mcg oral suspension  -Pt to follow-up with PCP as an outpatient in 4wks for repeat TSH/T4 and for dose adjustment    Medicine will continue to follow, please page with any additional concerns.     Adrianne Ball PA-C  Hospitalist Service   Pager: [phone]     Reason for Admission:   Depression  Anorexia, BMI 17.49  Anxiety  OCD         History of Present Illness:   History is obtained from the patient and medical record.     Gwen Cash is a 53 year old year old woman with a history of OCD, anxiety, depression c/b 30lb weight loss due to insufficient calories who is admitted to station 10A for depression. Internal Medicine consultation was ordered by Dr. Wei Powell for general medical evaluation regarding abnormal labs and anorexia with low BMI 17.49.       Patient reports that she eats 1 meal per day. She reports a hx of only eating 1 meal per day, however, she reports each meal has gotten smaller. For example, her regular diet consists of a sandwich, banana, and milk or a McDonalds burger, fries and soda whereas previously she would eat a large dinner such a spaghetti. She notes that  "she does not have an appetite and attributes this to excessive sleeping and depression. She denies any SI/HI.     Other than excessive tiredness, depression, and poor appetite, pt denies any fevers, chills, night sweats, myalgias, chest pain, SOB, chronic cough, hemoptysis, abdominal pain, nausea, or vomiting. She denies any family hx of cancer, other than her grandfather. She has not had a screening mammogram or colonoscopy before. She also denies a family hx of hypothyroidism or thyroid disorders. She reports smoking 1/2 pack of cigarettes per day since [age]years old. She has not seen a PCP for \"awhile.\" She currently does not have a PCP. She lives with her husbands and two sons at home. No other acute concerns at this time.             Review of Systems:   Constitutional: negative for fever/chills. Positive for 30lb weight loss. Positive for decreased appetite. Positive for fatigue and excessive sleeping   Eyes: negative for vision changes   Ears/Nose/Throat: negative for ear pain, sore throat, nasal or sinus congestion   Cardiovascular: negative for chest pain or palpitations   Respiratory: negative for cough or shortness of breath   Gastrointestinal: negative for abdominal pain, N/V, diarrhea or constipation   Genitourinary: negative for dysuria, urinary urgency or frequency   Musculoskeletal: negative for joint pain   Neurologic: negative for headaches or numbness, tingling, weakness of extremities   Skin: negative for rashes or wounds   Endocrine: negative for polydipsia, polyphagia, polyuria; negative for heat/cold intolerance   Psychiatry: positive for depression          Past Medical History:   Reviewed and updated in Epic.  Past Medical History:   Diagnosis Date     Calculus of right kidney 1997    had hydronephrosis & treated for mild corinna at that time, calcium phosphate stone s/p lithotripsy      Chronic depressive personality disorder      DUB (dysfunctional uterine bleeding) 2004    work up including " tsh ,labs, ecc , gyn eval normal      H/O echocardiogram 2011    at [facility] was normal , no valvular issues, done for functional benign murmur noted in past      History of Papanicolaou smear of cervix 01, 04    NIL     OCD (obsessive compulsive disorder) 3/4/2017     Panic disorder without agoraphobia 3/4/2017     Tobacco use disorder 3/4/2017             Past Surgical History:   Reviewed and updated in Epic.  Past Surgical History:   Procedure Laterality Date      SECTION      x 3     LITHOTRIPSY               Social History:     Social History   Substance Use Topics     Smoking status: Current Every Day Smoker     Packs/day: 0.50     Years: 30.00     Smokeless tobacco: Never Used     Alcohol use No             Family History:   Reviewed and updated in Epic.  Family History   Problem Relation Age of Onset     Coronary Artery Disease Mother      Cataracts Mother      Alcoholism Father      alcohol cardiomyopathy     Alcoholism Brother      Arthritis Brother      Depression Brother      Coronary Artery Disease Brother      Hypertension Brother              Allergies:     Allergies   Allergen Reactions     Metronidazole Unknown     Abstracted data , patient cannot remember      Penicillins Unknown     Abstracted data , patient cannot remember              Medications:     Prescriptions Prior to Admission   Medication Sig Dispense Refill Last Dose     clonazePAM (KLONOPIN) 0.5 MG tablet Take 1 tablet (0.5 mg) by mouth daily 15 tablet 0 11/15/2018 at 1700     PARoxetine (PAXIL) 20 MG tablet Take 1 tablet (20 mg) by mouth At Bedtime 90 tablet 0 11/15/2018 at 1700        Current Facility-Administered Medications   Medication     clonazePAM (klonoPIN) tablet 0.5 mg     hydrOXYzine (ATARAX) tablet 25 mg     [START ON 2018] levothyroxine (SYNTHROID/LEVOTHROID) tablet 25 mcg     PARoxetine (PAXIL) tablet 20 mg     risperiDONE (risperDAL) tablet 1 mg     traZODone (DESYREL) tablet 50 mg  "           Physical Exam:   Blood pressure 119/68, pulse 66, temperature 97.6  F (36.4  C), temperature source Oral, resp. rate 16, height 1.575 m (5' 2\"), weight 43.4 kg (95 lb 9.6 oz), SpO2 97 %.  Body mass index is 17.49 kg/(m^2).  GENERAL: Alert and oriented x 3. Thin, frail appearing for age. Pleasant and anxious.   HEENT: Anicteric sclera. Mucous membranes moist.   CV: RRR. S1, S2. No murmurs appreciated.   RESPIRATORY: Effort normal on room air. Lungs CTAB with no wheezing, rales, rhonchi.   GI: Abdomen soft, non distended, non tender. No guarding, rigidity or rebound tenderness.  MUSCULOSKELETAL: No joint swelling or tenderness.  NEUROLOGICAL: No focal deficits. Moves all extremities.   EXTREMITIES: No peripheral edema. Intact bilateral pedal pulses.   SKIN: No jaundice. No rashes.           Data:   CBC:  Recent Labs   Lab Test  11/16/18   0356   WBC  7.8   RBC  5.19   HGB  15.9*   HCT  46.3   MCV  89   MCH  30.6   MCHC  34.3   RDW  12.6   PLT  338       CMP:  Recent Labs   Lab Test  11/16/18   0356   NA  141   POTASSIUM  3.8   CHLORIDE  103   JOSE  9.5   CO2  29   BUN  6*   CR  0.72   GLC  103*   AST  11   ALT  17   BILITOTAL  0.4   ALBUMIN  4.9   PROTTOTAL  8.3   ALKPHOS  74       TSH:  TSH   Date Value Ref Range Status   11/16/2018 5.89 (H) 0.40 - 4.00 mU/L Final     T4: 0.93    Mg 2.5, Phos 3.5    Unresulted Labs Ordered in the Past 30 Days of this Admission     Date and Time Order Name Status Description    11/16/2018 0356 T4 free In process            "

## 2018-11-16 NOTE — PROGRESS NOTES
Pt. Took with her to the unit - 1 jacket, cigarettes, lighter, cell phone, slippers, jeans, shirt, medical insurance card, ID.

## 2018-11-16 NOTE — PROGRESS NOTES
Initial Psychosocial Assessment    I have reviewed the chart, met with the patient, and developed Care Plan.  Information for assessment was obtained from: chart review and interview with patient    Presenting Problem:  Pt is a 53 year old female that presented to the ED after family encouraged her to seek help.  She has major depression, OCD and has lost a significant amount of weight (30lbs since summer).  She reports being on the same medicaiton for the last 20 years (Paxil 20mgs, Klonopin .5mgs).  Over the past year she reports her symptoms have increased, she is having increased anxiety, increased depression, difficulty falling asleep.  However, she will also have instances of sleeping for 17 hours.  She reports that the only thing she gets up for is the bathroom, smoking and some meals. She has fears around dying/illness.  Her OCD is showing up as counting, checking things and straightening things over and over. She has fears over medication being adjusted as well.  When meeting with the patient she was having trouble staying awake, asked me to come back later.  I finished the interview, but often had to repeat questions.  She was oriented to time and place, no psychosis or thought disorder observed.  She was quiet, withdrawn and anxious.     History of Mental Health and Chemical Dependency:  Pt had one hospitalization here at [facility]/ in 2004.  Pt smokes cigarettes.  Denies any drug or alcohol use    Family Description (Constellation, Family Psychiatric History):  Pt was raised in [place], she is  and has 3 adult children.    Pt's Brother also has OCD  Has aunts and uncles that struggle with anxiety as well.     Significant Life Events (Illness, Abuse, Trauma, Death):  Pt's adult son this last June had an unintentional overdose.  He had to be revived in the hospital.  He is doing well now. This was traumatic for the pt and her symptoms have increased since this event.     Living Situation:  Pt lives with her      Educational Background:  Graduated High School    Occupational History:  Pt has not worked since 1990's.  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    Financial Status:  Pt does not receive any benefits.  [facility]    Legal Issues:  none    Ethnic/Cultural Issues:  none    Spiritual Orientation:  none     Service History:  none    Social Functioning (organization, interests):  None reported    Current Treatment Providers are:  - Medication Management:   Mercedes Aguero DNP  [facility].  [address] [place] [address] [address], [place] [address]    No therapy    Social Service Assessment/Plan:  Patient will have psychiatric assessment and medication management by the psychiatrist. Medications will be reviewed and adjusted per MD as indicated. The treatment team will continue to assess and stabilize the patient's mental health symptoms with the use of medications and therapeutic programming. Hospital staff will provide a safe environment and a therapeutic milieu. Staff will continue to assess patient as needed. Patient will participate in unit groups and activities. Patient will receive individual and group support on the unit.     CTC will do individual inpatient treatment planning and after care planning. CTC will discuss options for increasing community supports with the patient. CTC will coordinate with outpatient providers and will place referrals to ensure appropriate follow up care is in place.

## 2018-11-16 NOTE — ED NOTES
"ED to Behavioral Floor Handoff    SITUATION  Gwen Cash is a 53 year old female who speaks English and lives in a home with family members The patient arrived in the ED by private car from home with a complaint of Depression (patient reported she's been having depression but it got worse when she started lossing wt. Patient thinks there's something wrong and she's going to die. ) and Anorexia (pt reported poor appetite and unintentional wt. loss)  .The patient's current symptoms started/worsened 10 month(s) ago and during this time the symptoms have increased.   In the ED, pt was diagnosed with   Final diagnoses:   None        Initial vitals were: BP: (!) 143/94  Pulse: 92  Temp: 98.1  F (36.7  C)  Resp: 19  Height: 157.5 cm (5' 2\")  Weight: 43.4 kg (95 lb 9.6 oz)  SpO2: 99 %   --------  Is the patient diabetic? No   If yes, last blood glucose? --     If yes, was this treated in the ED? --  --------  Is the patient inebriated (ETOH) No or Impaired on other substances? No  MSSA done? N/A  Last MSSA score: --    Were withdrawal symptoms treated? N/A  Does the patient have a seizure history? No. If yes, date of most recent seizure--  --------  Is the patient patient experiencing suicidal ideation? denies current or recent suicidal ideation     Homicidal ideation? denies current or recent homicidal ideation or behaviors.    Self-injurious behavior/urges? denies current or recent self injurious behavior or ideation.  ------  Was pt aggressive in the ED No  Was a code called No  Is the pt now cooperative? Yes  -------  Meds given in ED:   Medications   hydrOXYzine (ATARAX) tablet 25 mg (not administered)   traZODone (DESYREL) tablet 50 mg (not administered)   clonazePAM (klonoPIN) tablet 0.5 mg (not administered)   PARoxetine (PAXIL) tablet 20 mg (not administered)      Family present during ED course? Yes  Family currently present? Yes    BACKGROUND  Does the patient have a cognitive impairment or developmental " disability? No  Allergies:   Allergies   Allergen Reactions     Metronidazole Unknown     Abstracted data , patient cannot remember      Penicillins Unknown     Abstracted data , patient cannot remember    .   Social demographics are   Social History     Social History     Marital status:      Spouse name: N/A     Number of children: N/A     Years of education: N/A     Social History Main Topics     Smoking status: Current Every Day Smoker     Packs/day: 0.50     Years: 30.00     Smokeless tobacco: Never Used     Alcohol use No     Drug use: No     Sexual activity: No     Other Topics Concern     None     Social History Narrative    Live with  and two of children     Has 3 kids ages 24, 22 and 19 as of 3/ 2017     Some cats at home    Not disabled    Not working currently         ASSESSMENT  Labs results   Labs Ordered and Resulted from Time of ED Arrival Up to the Time of Departure from the ED   CBC WITH PLATELETS DIFFERENTIAL - Abnormal; Notable for the following:        Result Value    Hemoglobin 15.9 (*)     All other components within normal limits   COMPREHENSIVE METABOLIC PANEL - Abnormal; Notable for the following:     Glucose 103 (*)     Urea Nitrogen 6 (*)     All other components within normal limits   TSH WITH FREE T4 REFLEX - Abnormal; Notable for the following:     TSH 5.89 (*)     All other components within normal limits   MAGNESIUM - Abnormal; Notable for the following:     Magnesium 2.4 (*)     All other components within normal limits   HCG QUALITATIVE URINE   PHOSPHORUS   UA MACROSCOPIC WITH REFLEX TO MICRO AND CULTURE   T4 FREE   T4 FREE   PERIPHERAL IV CATHETER   IP ASSIGN PROVIDER TEAM TO TREATMENT TEAM   OBTAIN MEDICAL RECORDS   VITAL SIGNS AND PAIN ASSESSMENT   MEASURE WEIGHT      Imaging Studies: No results found for this or any previous visit (from the past 24 hour(s)).   Most recent vital signs BP (!) 155/97  Pulse 92  Temp 98.1  F (36.7  C) (Oral)  Resp 19  Ht 1.575 m  "(5' 2\")  Wt 43.4 kg (95 lb 9.6 oz)  SpO2 99%  BMI 17.49 kg/m2   Abnormal labs/tests/findings requiring intervention:---   Pain control: pt had none  Nausea control: pt had none    RECOMMENDATION  Are any infection precautions needed (MRSA, VRE, etc.)? No If yes, what infection? --  ---  Does the patient have mobility issues? independently. If yes, what device does the pt use? ---  ---  Is patient on 72 hour hold or commitment? No If on 72 hour hold, have hold and rights been given to patient? N/A  Are admitting orders written if after 10 p.m. ?Yes  Tasks needing to be completed:---     Carolynn Machado   [facility]--    [phone] [place] ED   [phone] [facility] ED  "

## 2018-11-16 NOTE — PLAN OF CARE
"Problem: Depressive Symptoms  Goal: Depressive Symptoms  Signs and symptoms of listed problems will be absent or manageable.   Patient was isolative to her room sleeping/napping and not attending group ctivities. She declined both breakfast and lunch stating \"i am not hungry, just need to sleep\". Pt states she planing on eating dinner tonight. She endorses depression at 5/10 and anxiety at 8/10. Pt denies SI,HI,SIB.       "

## 2018-11-17 LAB
ANION GAP SERPL CALCULATED.3IONS-SCNC: 6 MMOL/L (ref 3–14)
BUN SERPL-MCNC: 12 MG/DL (ref 7–30)
CALCIUM SERPL-MCNC: 9.2 MG/DL (ref 8.5–10.1)
CHLORIDE SERPL-SCNC: 103 MMOL/L (ref 94–109)
CO2 SERPL-SCNC: 32 MMOL/L (ref 20–32)
CREAT SERPL-MCNC: 0.94 MG/DL (ref 0.52–1.04)
GFR SERPL CREATININE-BSD FRML MDRD: 62 ML/MIN/1.7M2
GLUCOSE SERPL-MCNC: 91 MG/DL (ref 70–99)
MAGNESIUM SERPL-MCNC: 2.7 MG/DL (ref 1.6–2.3)
PHOSPHATE SERPL-MCNC: 4 MG/DL (ref 2.5–4.5)
POTASSIUM SERPL-SCNC: 4.4 MMOL/L (ref 3.4–5.3)
SODIUM SERPL-SCNC: 141 MMOL/L (ref 133–144)

## 2018-11-17 PROCEDURE — 36415 COLL VENOUS BLD VENIPUNCTURE: CPT | Performed by: PHYSICIAN ASSISTANT

## 2018-11-17 PROCEDURE — 80048 BASIC METABOLIC PNL TOTAL CA: CPT | Performed by: PHYSICIAN ASSISTANT

## 2018-11-17 PROCEDURE — 83735 ASSAY OF MAGNESIUM: CPT | Performed by: PHYSICIAN ASSISTANT

## 2018-11-17 PROCEDURE — 25000132 ZZH RX MED GY IP 250 OP 250 PS 637: Performed by: EMERGENCY MEDICINE

## 2018-11-17 PROCEDURE — 84100 ASSAY OF PHOSPHORUS: CPT | Performed by: PHYSICIAN ASSISTANT

## 2018-11-17 PROCEDURE — 12400007 ZZH R&B MH INTERMEDIATE UMMC

## 2018-11-17 PROCEDURE — 25000132 ZZH RX MED GY IP 250 OP 250 PS 637: Performed by: PHYSICIAN ASSISTANT

## 2018-11-17 PROCEDURE — 25000132 ZZH RX MED GY IP 250 OP 250 PS 637: Performed by: PSYCHIATRY & NEUROLOGY

## 2018-11-17 RX ORDER — PAROXETINE 20 MG/1
20 TABLET, FILM COATED ORAL EVERY EVENING
Status: DISCONTINUED | OUTPATIENT
Start: 2018-11-17 | End: 2018-11-19

## 2018-11-17 RX ADMIN — LEVOTHYROXINE SODIUM 25 MCG: 25 TABLET ORAL at 08:49

## 2018-11-17 RX ADMIN — PAROXETINE 20 MG: 20 TABLET, FILM COATED ORAL at 16:36

## 2018-11-17 RX ADMIN — CLONAZEPAM 0.5 MG: 0.5 TABLET ORAL at 16:36

## 2018-11-17 RX ADMIN — RISPERIDONE 1 MG: 1 TABLET ORAL at 21:22

## 2018-11-17 ASSESSMENT — ACTIVITIES OF DAILY LIVING (ADL)
SWALLOWING: 0-->SWALLOWS FOODS/LIQUIDS WITHOUT DIFFICULTY
COGNITION: 0 - NO COGNITION ISSUES REPORTED
AMBULATION: 0-->INDEPENDENT
BATHING: 0-->INDEPENDENT
FALL_HISTORY_WITHIN_LAST_SIX_MONTHS: NO
GROOMING: INDEPENDENT
TOILETING: 0-->INDEPENDENT
TRANSFERRING: 0-->INDEPENDENT
LAUNDRY: WITH SUPERVISION
RETIRED_COMMUNICATION: 0-->UNDERSTANDS/COMMUNICATES WITHOUT DIFFICULTY
RETIRED_EATING: 0-->INDEPENDENT
DRESS: INDEPENDENT
ORAL_HYGIENE: INDEPENDENT
DRESS: 0-->INDEPENDENT

## 2018-11-17 NOTE — PROGRESS NOTES
"Pt has stayed in bed most of the shift. When asked why, she said, \"It's because I'm depressed I guess\". Pt said she \"really has no anxiety\". Pt did not eat breakfast but did eat apprx 50% of her lunch, per her report. Pt denies having an eating disorder but said her low appetite is a problem. Pt denies SI and SIB thoughts; pt is hopeful. Pt needs a shower and has body odor. She was encouraged to take one but she said, \"Look though, my pants don't even fit me\".     11/17/18 1219   Behavioral Health   Hallucinations denies / not responding to hallucinations   Thinking intact   Orientation person: oriented;place: oriented;date: oriented;time: oriented   Memory baseline memory   Insight poor   Judgement impaired   Eye Contact at examiner   Affect blunted, flat   Mood depressed   Physical Appearance/Attire appears stated age;attire appropriate to age and situation   Hygiene (adequate)   Suicidality (denies)   1. Wish to be Dead No   2. Non-Specific Active Suicidal Thoughts  No   Self Injury (denies)   Elopement (WDL) WDL   Activity isolative;withdrawn   Speech coherent;clear   Medication Sensitivity no stated side effects;no observed side effects   Psychomotor / Gait balanced;steady     "

## 2018-11-17 NOTE — H&P
"[facility] [place]   Psychiatric History & Physical  Admission date: 11/16/2018        Chief Complaint:   \"I've been pretty depressed, I don't eat anything...and my OCD symptoms are worse now\"         HPI:     Gwen is a 53 year old female with a history of depression, anxiety, OCD who is admitted on a voluntary basis for worsening depressive moods, lack of oral intake, and worsened obsessive/compulsive symptoms. The patient was brought in by her family for concerns of worsening depression, isolativeness, lack of oral intake, lack of volitional behavior, and excessive sleeping. The patient was seen in her room, was resting, but was mildly open to speak with me. She mentions that she has not been eating, due to lack of appetite, and has lost about 30 lbs in the past 4-5 months. She has been sleeping excessively, not caring for herself, spending excessive time checking/counting, and staying in her room. She denies any recent or current suicidal ideation, intent or plan, but does ruminate about dying and is fearful if she has some chronic illness. Collateral information from patient's family revealed that the patient spends most of her day on her chair, compulsively counting things, repetitively checking and getting worse in terms of her mood, thoughts and ability to care for self. The patient mentions that she has been taking Paxil and Klonopin since the 90s. Is open to some medication recommendations and also being seen by dietician and PA for her appetite/weight issues.         Past Psychiatric History:   Past inpatient treatment: Once past hospitalization in [facility] in 2004   Current outpatient psychiatrist: Provider at [facility]  Current outpatient therapist: Unclear   Other (ACT team etc): None  Past suicide attempts: Denies   History of commitments: None  History of ECT: None         Substance Use and History:   Denies illicit substance use history. Utox was not taken.         " Past Medical History:   PAST MEDICAL HISTORY:   Past Medical History:   Diagnosis Date     Calculus of right kidney     had hydronephrosis & treated for mild corinna at that time, calcium phosphate stone s/p lithotripsy      Chronic depressive personality disorder      DUB (dysfunctional uterine bleeding)     work up including tsh ,labs, ecc , gyn eval normal      H/[facility] 2011    [facility] , no valvular issues, done for functional benign murmur noted in past      History of Papanicolaou smear of cervix 01, 04    NIL     OCD (obsessive compulsive disorder) 3/4/2017     Panic disorder without agoraphobia 3/4/2017     Tobacco use disorder 3/4/2017       PAST SURGICAL HISTORY:   Past Surgical History:   Procedure Laterality Date      SECTION      x 3     LITHOTRIPSY               Family History:   FAMILY HISTORY:   Family History   Problem Relation Age of Onset     Coronary Artery Disease Mother      Cataracts Mother      Alcoholism Father      alcohol cardiomyopathy     Alcoholism Brother      Arthritis Brother      Depression Brother      Coronary Artery Disease Brother      Hypertension Brother            Social History:   SOCIAL HISTORY:   Social History   Substance Use Topics     Smoking status: Current Every Day Smoker     Packs/day: 0.50     Years: 30.00     Smokeless tobacco: Never Used     Alcohol use No            Physical ROS:   The patient endorsed weakness, lethargy. A 10-point review of systems was negative except as noted in HPI.         PTA Medications:     Prescriptions Prior to Admission   Medication Sig Dispense Refill Last Dose     clonazePAM (KLONOPIN) 0.5 MG tablet Take 1 tablet (0.5 mg) by mouth daily 15 tablet 0 11/15/2018 at 1700     PARoxetine (PAXIL) 20 MG tablet Take 1 tablet (20 mg) by mouth At Bedtime 90 tablet 0 11/15/2018 at 1700          Allergies:     Allergies   Allergen Reactions     Metronidazole Unknown     Abstracted data  , patient cannot remember      Penicillins Unknown     Abstracted data , patient cannot remember           Labs:     Recent Results (from the past 48 hour(s))   CBC with platelets differential    Collection Time: 11/16/18  3:56 AM   Result Value Ref Range    WBC 7.8 4.0 - 11.0 10e9/L    RBC Count 5.19 3.8 - 5.2 10e12/L    Hemoglobin 15.9 (H) 11.7 - 15.7 g/dL    Hematocrit 46.3 35.0 - 47.0 %    MCV 89 78 - 100 fl    MCH 30.6 26.5 - 33.0 pg    MCHC 34.3 31.5 - 36.5 g/dL    RDW 12.6 10.0 - 15.0 %    Platelet Count 338 150 - 450 10e9/L    Diff Method Automated Method     % Neutrophils 52.8 %    % Lymphocytes 33.1 %    % Monocytes 11.0 %    % Eosinophils 1.7 %    % Basophils 1.3 %    % Immature Granulocytes 0.1 %    Nucleated RBCs 0 0 /100    Absolute Neutrophil 4.1 1.6 - 8.3 10e9/L    Absolute Lymphocytes 2.6 0.8 - 5.3 10e9/L    Absolute Monocytes 0.9 0.0 - 1.3 10e9/L    Absolute Eosinophils 0.1 0.0 - 0.7 10e9/L    Absolute Basophils 0.1 0.0 - 0.2 10e9/L    Abs Immature Granulocytes 0.0 0 - 0.4 10e9/L    Absolute Nucleated RBC 0.0    Comprehensive metabolic panel    Collection Time: 11/16/18  3:56 AM   Result Value Ref Range    Sodium 141 133 - 144 mmol/L    Potassium 3.8 3.4 - 5.3 mmol/L    Chloride 103 94 - 109 mmol/L    Carbon Dioxide 29 20 - 32 mmol/L    Anion Gap 9 3 - 14 mmol/L    Glucose 103 (H) 70 - 99 mg/dL    Urea Nitrogen 6 (L) 7 - 30 mg/dL    Creatinine 0.72 0.52 - 1.04 mg/dL    GFR Estimate 84 >60 mL/min/1.7m2    GFR Estimate If Black >90 >60 mL/min/1.7m2    Calcium 9.5 8.5 - 10.1 mg/dL    Bilirubin Total 0.4 0.2 - 1.3 mg/dL    Albumin 4.9 3.4 - 5.0 g/dL    Protein Total 8.3 6.8 - 8.8 g/dL    Alkaline Phosphatase 74 40 - 150 U/L    ALT 17 0 - 50 U/L    AST 11 0 - 45 U/L   TSH with free T4 reflex    Collection Time: 11/16/18  3:56 AM   Result Value Ref Range    TSH 5.89 (H) 0.40 - 4.00 mU/L   Magnesium    Collection Time: 11/16/18  3:56 AM   Result Value Ref Range    Magnesium 2.4 (H) 1.6 - 2.3 mg/dL  "  Phosphorus    Collection Time: 11/16/18  3:56 AM   Result Value Ref Range    Phosphorus 3.5 2.5 - 4.5 mg/dL   T4 free    Collection Time: 11/16/18  3:56 AM   Result Value Ref Range    T4 Free 0.93 0.76 - 1.46 ng/dL   HCG qualitative urine    Collection Time: 11/16/18  4:31 AM   Result Value Ref Range    HCG Qual Urine Negative NEG^Negative   UA reflex to Microscopic and Culture    Collection Time: 11/16/18  4:31 AM   Result Value Ref Range    Color Urine Light Yellow     Appearance Urine Clear     Glucose Urine Negative NEG^Negative mg/dL    Bilirubin Urine Negative NEG^Negative    Ketones Urine Negative NEG^Negative mg/dL    Specific Gravity Urine 1.004 1.003 - 1.035    Blood Urine Negative NEG^Negative    pH Urine 6.5 5.0 - 7.0 pH    Protein Albumin Urine Negative NEG^Negative mg/dL    Urobilinogen mg/dL Normal 0.0 - 2.0 mg/dL    Nitrite Urine Negative NEG^Negative    Leukocyte Esterase Urine Negative NEG^Negative    Source Midstream Urine           Physical and Psychiatric Examination:     /86  Pulse 76  Temp 98.2  F (36.8  C) (Oral)  Resp 16  Ht 1.575 m (5' 2\")  Wt 43.4 kg (95 lb 9.6 oz)  SpO2 97%  BMI 17.49 kg/m2  Weight is 95 lbs 9.6 oz  Body mass index is 17.49 kg/(m^2).    Physical Exam:  I have reviewed the physical exam as documented by ED provider and agree with findings and assessment and have no additional findings to add at this time.    Mental Status Exam:  Appearance:  female. Thin, malnourished appearing. Pale skin. Appears fatigued. Unkept/disheveled appearance. No acute distress.   Attitude:  Passive, not wholly engaged in conversation. Mildly cooperative.   Eye Contact:  fair  Mood:  anxious and depressed  Affect:  intensity is blunted  Speech:  clear, coherent  Language: fluent and intact in English  Psychomotor, Gait, Musculoskeletal:  no evidence of tardive dyskinesia, dystonia, or tics  Throught Process:  logical  Associations:  no loose associations  Thought Content:  " no evidence of suicidal ideation or homicidal ideation, no evidence of psychotic thought, no auditory hallucinations present and no visual hallucinations present  Insight:  partial  Judgement:  poor  Oriented to:  time, person, and place  Attention Span and Concentration:  fair/limited   Recent and Remote Memory:  fair  Fund of Knowledge:  appropriate         Admission Diagnoses:   Major Depressive Disorder, recurrent, severe  OCD  Subclinical Hypothyroidism          Assessment & Plan:     Assessment:  Patient appears to be experiencing worsening neurovegetative symptoms of depression, particularly apathy, avolition/amotivation, anergy, along with a serious weight loss within a short amount of time (4-5 months). Her weight loss appears to be related to her depression, but malignant etiology should be ruled out at some point. She also has worsened compulsions/obsessions characteristic of OCD. We discussed the need to initiate medications to help augment her mood, sleep and weight gain. Discussed starting Risperdal to help with OCD and depression augmentation, and Remeron for additional sleep/appetite improvement. Will work with medicine PA to assess for refeeding issues as time goes on, along with consult with nutritionist for recommendations on improved caloric intake.     Plan:  1.) Medication Plan:   -Start Risperdal 1 mg HS  - Start Remeron 7.5 mg HS  - Paxil 20 mg  - Klonopin 0.5 mg Daily    2.) Psychosocial Plan:  - Work with CTC to help arrange appropriate follow up care  - Nutritionist Consult     Legal:  Voluntary (but hold-able)     Disposition:  Unclear, pending stabilization        Wei Powell MD  [facility]

## 2018-11-17 NOTE — ED PROVIDER NOTES
History     Chief Complaint   Patient presents with     Depression     patient reported she's been having depression but it got worse when she started lossing wt. Patient thinks there's something wrong and she's going to die.      Anorexia     pt reported poor appetite and unintentional wt. loss     HPI  Gwen Cash is a 53 year old female with a history of depression, anxiety, OCD who presents with family and concerns for worsening depressive moods, lack of oral intake, and worsened obsessive/compulsive symptoms in setting of significant family stressors this year.  Patient's family is concerned about severe interference with patient's activities of daily living as a routine over the past few months has been limited to awaken from bed sitting in a chair all day eating and using the bathroom but otherwise not leaving the house.  They report it took a significant family intervention to convince the patient to report to the emergency department to be evaluated.        Problem List:    Patient Active Problem List    Diagnosis Date Noted     Depression 11/16/2018     Priority: Medium     OCD (obsessive compulsive disorder) 03/04/2017     Priority: Medium     continues to be free of obsessions and compulsions and panic. She takes Paxil 20 mg regularly together with Klonopin 0.5 mg every afternoon. She had hives with generic Paxil so it is written d.a.w. While hospitalized in 2004 she went off Klonopin, possibly also the Paxil, for 3-4 weeks and experienced a heightened level of very uncomfortable anxiety. She's been back on the Paxil and Klonopin at current doses ever since then. History of very severe anxiety in the context of hyperemesis gravidarum in 1996 with subsequent OCD symptoms, all of which are in long-term remission with low-dose Paxil and Klonopin. One attempt to take her off the Klonopin (possibly also the Paxil) caused excessive anxiety so she has remained on both drugs. She has not overused any of  "them. Because of her history of severe anxiety it would be well for her to be followed by psychiatrist even though she is stable       Panic disorder without agoraphobia 2017     Priority: Medium     Tobacco use disorder 2017     Priority: Medium     Tried zyban once in past.cut back from 1 to 1/2 pack        Chronic depressive personality disorder      Priority: Medium        Past Medical History:    Past Medical History:   Diagnosis Date     Calculus of right kidney      Chronic depressive personality disorder      DUB (dysfunctional uterine bleeding)      H/O echocardiogram 2011     History of Papanicolaou smear of cervix 01, 04     OCD (obsessive compulsive disorder) 3/4/2017     Panic disorder without agoraphobia 3/4/2017     Tobacco use disorder 3/4/2017       Past Surgical History:    Past Surgical History:   Procedure Laterality Date      SECTION      x 3     LITHOTRIPSY         Family History:    Family History   Problem Relation Age of Onset     Coronary Artery Disease Mother      Cataracts Mother      Alcoholism Father      alcohol cardiomyopathy     Alcoholism Brother      Arthritis Brother      Depression Brother      Coronary Artery Disease Brother      Hypertension Brother        Social History:  Marital Status:   [2]  Social History   Substance Use Topics     Smoking status: Current Every Day Smoker     Packs/day: 0.50     Years: 30.00     Smokeless tobacco: Never Used     Alcohol use No        Medications:      No current outpatient prescriptions on file.      Review of Systems   Constitutional: Positive for activity change and appetite change. Negative for chills and fever.   Respiratory: Negative for shortness of breath.    Cardiovascular: Negative for chest pain.       Physical Exam   BP: (!) 143/94  Pulse: 92  Temp: 98.1  F (36.7  C)  Resp: 19  Height: 157.5 cm (5' 2\")  Weight: 43.4 kg (95 lb 9.6 oz)  SpO2: 99 %  Lying Orthostatic BP: " 124/71  Lying Orthostatic Pulse: 62 bpm  Sitting Orthostatic BP: 167/96  Sitting Orthostatic Pulse: 76 bpm  Standing Orthostatic BP: 136/92  Standing Orthostatic Pulse: 83 bpm      Physical Exam   Constitutional: She is oriented to person, place, and time. She appears well-developed and well-nourished. No distress.   HENT:   Head: Atraumatic.   Mouth/Throat: Oropharynx is clear and moist. No oropharyngeal exudate.   Eyes: Pupils are equal, round, and reactive to light. No scleral icterus.   Cardiovascular: Normal rate, regular rhythm, normal heart sounds and intact distal pulses.    Pulmonary/Chest: Breath sounds normal. No respiratory distress.   Abdominal: Soft. Bowel sounds are normal. There is no tenderness.   Musculoskeletal: She exhibits no edema or tenderness.   Neurological: She is alert and oriented to person, place, and time.   Skin: Skin is warm. No rash noted. She is not diaphoretic.   Nursing note and vitals reviewed.      ED Course     ED Course     Procedures               Critical Care time:  none               Results for orders placed or performed during the hospital encounter of 11/16/18 (from the past 24 hour(s))   Basic metabolic panel   Result Value Ref Range    Sodium 141 133 - 144 mmol/L    Potassium 4.4 3.4 - 5.3 mmol/L    Chloride 103 94 - 109 mmol/L    Carbon Dioxide 32 20 - 32 mmol/L    Anion Gap 6 3 - 14 mmol/L    Glucose 91 70 - 99 mg/dL    Urea Nitrogen 12 7 - 30 mg/dL    Creatinine 0.94 0.52 - 1.04 mg/dL    GFR Estimate 62 >60 mL/min/1.7m2    GFR Estimate If Black 75 >60 mL/min/1.7m2    Calcium 9.2 8.5 - 10.1 mg/dL   Magnesium   Result Value Ref Range    Magnesium 2.7 (H) 1.6 - 2.3 mg/dL   Phosphorus   Result Value Ref Range    Phosphorus 4.0 2.5 - 4.5 mg/dL       Medications   hydrOXYzine (ATARAX) tablet 25 mg (not administered)   traZODone (DESYREL) tablet 50 mg (not administered)   clonazePAM (klonoPIN) tablet 0.5 mg (0.5 mg Oral Given 11/16/18 1651)   PARoxetine (PAXIL) tablet 20  mg (20 mg Oral Given 11/16/18 1651)   risperiDONE (risperDAL) tablet 1 mg (1 mg Oral Not Given 11/16/18 2123)   levothyroxine (SYNTHROID/LEVOTHROID) tablet 25 mcg (25 mcg Oral Given 11/17/18 0849)   mirtazapine (REMERON) tablet TABS 7.5 mg (7.5 mg Oral Not Given 11/16/18 2122)   acetaminophen (TYLENOL) tablet 325 mg (not administered)   docusate sodium (COLACE) capsule 100 mg (not administered)   gabapentin (NEURONTIN) capsule 300 mg (not administered)       Assessments & Plan (with Medical Decision Making)     In summary this is a 53-year-old female with history of major depressive disorder, anxiety, OCD who comes in coming by family who concerned about worsening of all of her negative symptoms and severe limitations to her daily function.  Patient evaluated in conjunction with the behavioral health crisis assessment with agreement on admission.  Both patient and family updated and also agreeable to this plan.    I have reviewed the nursing notes.    I have reviewed the findings, diagnosis, plan and need for follow up with the patient.       Current Discharge Medication List          Final diagnoses:   Major depressive disorder, remission status unspecified, unspecified whether recurrent       11/16/2018   UR 10NB     Brandt Escalona MD  11/18/18 6173       Brandt Escalona MD  01/11/19 7286

## 2018-11-17 NOTE — PLAN OF CARE
Late admission note:    Patient is a 53 year old female admitted from ED with depression. No active medical conditions, diagnosis of OCD and anxiety. Patient states she has lost 30 pounds recently and spends most of the day sleeping. Upon assessment patient was very thin, her back bones could be felt, patient was aloud to keep her clothes since the size small scrubs did not fit. Current stressors are her son overdosing during the summer, son survived, and her  having cancer, his status is improving. Patient was calm and cooperative during assessment. Patient was very distractable and needed frequently redirection. She was fixated with where her belongings will be going, that she wants to be present when staff is checking her belongings, her wanting brand name and not generic medications, needing to step outside to smoke. Patient was informed we have nicotine patches and gum. She is A&O x4, patient denied SI/HI and AVH. Status is voluntary. Plan of care is to monitor Q15 minutes, assess for depression and anxiety, build rapport and trust with patient, encourage group participation, encourage healthy coping.    Patient arrived on unit at 0645, over an hour after  left to bring her to the unit.  stated she was very fixated on going out to smoke. Primary assessment left for day staff due to lack of time and frequent redirection of patient.

## 2018-11-17 NOTE — PROGRESS NOTES
"   11/16/18 2100   Behavioral Health   Hallucinations denies / not responding to hallucinations   Thinking poor concentration   Orientation person: oriented;place: oriented   Memory baseline memory   Insight poor   Judgement impaired   Eye Contact at examiner   Affect full range affect   Mood mood is calm   Physical Appearance/Attire neat   Hygiene well groomed   Suicidality other (see comments)  (denies )   1. Wish to be Dead No   2. Non-Specific Active Suicidal Thoughts  No   Self Injury other (see comment)  (denies )   Activity other (see comment)  (visible in the milieu and socialized with tohers )   Speech clear;coherent   Activities of Daily Living   Hygiene/Grooming independent   Oral Hygiene independent   Dress independent   Laundry with supervision   Room Organization independent       Pt denied SI and SIB.  Pt reported feeling \" I'm doing good.\"  Pt seems calm, ate supper, visible in the milieu and socialized with others.   "

## 2018-11-18 LAB
ANION GAP SERPL CALCULATED.3IONS-SCNC: 4 MMOL/L (ref 3–14)
BUN SERPL-MCNC: 14 MG/DL (ref 7–30)
CALCIUM SERPL-MCNC: 8.8 MG/DL (ref 8.5–10.1)
CHLORIDE SERPL-SCNC: 106 MMOL/L (ref 94–109)
CO2 SERPL-SCNC: 30 MMOL/L (ref 20–32)
CREAT SERPL-MCNC: 0.81 MG/DL (ref 0.52–1.04)
GFR SERPL CREATININE-BSD FRML MDRD: 74 ML/MIN/1.7M2
GLUCOSE SERPL-MCNC: 92 MG/DL (ref 70–99)
MAGNESIUM SERPL-MCNC: 2.5 MG/DL (ref 1.6–2.3)
PHOSPHATE SERPL-MCNC: 4 MG/DL (ref 2.5–4.5)
POTASSIUM SERPL-SCNC: 4.1 MMOL/L (ref 3.4–5.3)
SODIUM SERPL-SCNC: 140 MMOL/L (ref 133–144)

## 2018-11-18 PROCEDURE — 84100 ASSAY OF PHOSPHORUS: CPT | Performed by: PHYSICIAN ASSISTANT

## 2018-11-18 PROCEDURE — 12400007 ZZH R&B MH INTERMEDIATE UMMC

## 2018-11-18 PROCEDURE — 25000132 ZZH RX MED GY IP 250 OP 250 PS 637: Performed by: PHYSICIAN ASSISTANT

## 2018-11-18 PROCEDURE — 36415 COLL VENOUS BLD VENIPUNCTURE: CPT | Performed by: PHYSICIAN ASSISTANT

## 2018-11-18 PROCEDURE — 80048 BASIC METABOLIC PNL TOTAL CA: CPT | Performed by: PHYSICIAN ASSISTANT

## 2018-11-18 PROCEDURE — 83735 ASSAY OF MAGNESIUM: CPT | Performed by: PHYSICIAN ASSISTANT

## 2018-11-18 PROCEDURE — 25000132 ZZH RX MED GY IP 250 OP 250 PS 637: Performed by: EMERGENCY MEDICINE

## 2018-11-18 RX ADMIN — PAROXETINE 20 MG: 20 TABLET, FILM COATED ORAL at 16:37

## 2018-11-18 RX ADMIN — CLONAZEPAM 0.5 MG: 0.5 TABLET ORAL at 16:34

## 2018-11-18 RX ADMIN — LEVOTHYROXINE SODIUM 25 MCG: 25 TABLET ORAL at 08:36

## 2018-11-18 ASSESSMENT — ACTIVITIES OF DAILY LIVING (ADL)
ORAL_HYGIENE: INDEPENDENT
DRESS: STREET CLOTHES
LAUNDRY: WITH SUPERVISION
GROOMING: INDEPENDENT

## 2018-11-18 NOTE — PLAN OF CARE
"Problem: Depressive Symptoms  Goal: Depressive Symptoms  Signs and symptoms of listed problems will be absent or manageable.   Outcome: Improving  \"I'm feeling about the same.\"  Indicates depression \"may be a little better\", rates this a 6 on a scale with 10 being the worst.  States anxiety fluctuates, but \"I\"m not very anxious this morning\" rating this at a 3 on the same scale.  Denies suicidal ideation, wish to be dead or thoughts of self harm at this time.  States focus and concentration are \"off\", but this has improved \"a little bit\".  Feels ruminating and racing thoughts have decreased.  Indicates slept \"OK\" last night, yet continues to stay in bed in the morning as is \"very tired\".  Talked about this being her pattern at home as stays in bed most of the time due to depression.  Feels is getting out of bed more now than did at home.  Talked about not wanting to take HS Risperidone again as \"it makes me feel tired and blah\".  Was encouraged to take it again tonight to allow body to adjust to it as that feeling will diminish and to discuss with psychiatrist tomorrow.  Indicates would be willing to work with a therapist post discharge and will continue to see psychiatric NP for medication management.  Did not get up for breakfast, has remained in bed resting.  States will eat lunch and attempt to stay out of bed more this afternoon.  On 11/17 ate 50% of lunch and ate supper.      "

## 2018-11-18 NOTE — PROGRESS NOTES
"Pt reported that she didn't like the way her Risperdal made her feel.  She stated that she feels \"weird\" and \"woozy.\"  She also said that she doesn't want to take it anymore.  This writer informed her that it can make people feel groggy, encouraged her to consider continuing it, and to discuss with staff tomorrow.  "

## 2018-11-18 NOTE — PLAN OF CARE
"Problem: Depressive Symptoms  Intervention: Social and Therapeutic Interv (Depressive Symptoms)  RN48/    11/17/18 8795   Behavioral Health Interventions   Social and Therapeutic Interventions (Depression) encourage participation in therapeutic groups and milieu activities;encourage socialization with peers;encourage effective boundaries with peers  (Provide reassurance and positive feedback regulary)     Pt isolative in room and withdrawn socially, blunt/anxious affect, reports mood \"depressed, sad and fearful\", perseverative on her medications/ concrete thoughts, delayed/blocked thought processing and would get stuck ruminating; eventually she would redirect by distraction and limit setting of only going to allow 5 more minutes to discuss; pt demonstrates awareness of her symptoms, became tearful, \"I don't know what is going on with me,\" she cried. denies SI/SIB, feels she is sleeping well at night and appetite was good this evening; multiple requests and questions; daughter and son and his girlfriend visit this evening and appeared to go well.   VS reviewed: /77  Pulse 75  Temp 97.8  F (36.6  C) (Oral)  Resp 16  Ht 1.575 m (5' 2\")  Wt 43.4 kg (95 lb 9.6 oz)  SpO2 97%  BMI 17.49 kg/m2 . Patient denies  pain.    Patient evaluation continues. Assessed mood,anxiety,thoughts and behavior. Patient is progressing toward goal of taking medication and started the risperidone 1 mg this evening at 9 pm with reassurance and education; pt unable to read without her reading glasses and reports her sister is planning to bring them to her; verbal hypothyroid and synthroid education given also. Patient accepting of feedback and encouraged utilizing coping skills and sleep hygiene tonight.     Length of stay: 1    Refer to daily team meeting notes for individualized plan of care. Nursing will continue to assess.    * Scale is offered as scale of 1 to 10 with 10 being the worst.                   "

## 2018-11-19 LAB
ANION GAP SERPL CALCULATED.3IONS-SCNC: 5 MMOL/L (ref 3–14)
BUN SERPL-MCNC: 14 MG/DL (ref 7–30)
CALCIUM SERPL-MCNC: 8.9 MG/DL (ref 8.5–10.1)
CHLORIDE SERPL-SCNC: 106 MMOL/L (ref 94–109)
CO2 SERPL-SCNC: 30 MMOL/L (ref 20–32)
CREAT SERPL-MCNC: 0.74 MG/DL (ref 0.52–1.04)
GFR SERPL CREATININE-BSD FRML MDRD: 82 ML/MIN/1.7M2
GLUCOSE SERPL-MCNC: 83 MG/DL (ref 70–99)
MAGNESIUM SERPL-MCNC: 2.5 MG/DL (ref 1.6–2.3)
PHOSPHATE SERPL-MCNC: 3.1 MG/DL (ref 2.5–4.5)
POTASSIUM SERPL-SCNC: 4 MMOL/L (ref 3.4–5.3)
SODIUM SERPL-SCNC: 141 MMOL/L (ref 133–144)

## 2018-11-19 PROCEDURE — 80048 BASIC METABOLIC PNL TOTAL CA: CPT | Performed by: PHYSICIAN ASSISTANT

## 2018-11-19 PROCEDURE — 36415 COLL VENOUS BLD VENIPUNCTURE: CPT | Performed by: PHYSICIAN ASSISTANT

## 2018-11-19 PROCEDURE — 25000132 ZZH RX MED GY IP 250 OP 250 PS 637: Performed by: PHYSICIAN ASSISTANT

## 2018-11-19 PROCEDURE — 12400007 ZZH R&B MH INTERMEDIATE UMMC

## 2018-11-19 PROCEDURE — 83735 ASSAY OF MAGNESIUM: CPT | Performed by: PHYSICIAN ASSISTANT

## 2018-11-19 PROCEDURE — 99232 SBSQ HOSP IP/OBS MODERATE 35: CPT | Performed by: PSYCHIATRY & NEUROLOGY

## 2018-11-19 PROCEDURE — 84100 ASSAY OF PHOSPHORUS: CPT | Performed by: PHYSICIAN ASSISTANT

## 2018-11-19 PROCEDURE — 25000132 ZZH RX MED GY IP 250 OP 250 PS 637: Performed by: EMERGENCY MEDICINE

## 2018-11-19 PROCEDURE — 25000132 ZZH RX MED GY IP 250 OP 250 PS 637: Performed by: PSYCHIATRY & NEUROLOGY

## 2018-11-19 PROCEDURE — 99207 ZZC CDG-MDM COMPONENT: MEETS MODERATE - UP CODED: CPT | Performed by: PSYCHIATRY & NEUROLOGY

## 2018-11-19 RX ORDER — BUPROPION HYDROCHLORIDE 100 MG/1
100 TABLET, EXTENDED RELEASE ORAL
Status: DISCONTINUED | OUTPATIENT
Start: 2018-11-20 | End: 2018-11-20

## 2018-11-19 RX ORDER — PAROXETINE 20 MG/1
20 TABLET, FILM COATED ORAL
Status: DISCONTINUED | OUTPATIENT
Start: 2018-11-20 | End: 2018-11-20

## 2018-11-19 RX ORDER — PAROXETINE 20 MG/1
20 TABLET, FILM COATED ORAL EVERY EVENING
Status: COMPLETED | OUTPATIENT
Start: 2018-11-19 | End: 2018-11-19

## 2018-11-19 RX ORDER — BUPROPION HYDROCHLORIDE 75 MG/1
75 TABLET ORAL DAILY
Status: COMPLETED | OUTPATIENT
Start: 2018-11-19 | End: 2018-11-19

## 2018-11-19 RX ADMIN — LEVOTHYROXINE SODIUM 25 MCG: 25 TABLET ORAL at 08:26

## 2018-11-19 RX ADMIN — BUPROPION HYDROCHLORIDE 75 MG: 75 TABLET, FILM COATED ORAL at 16:29

## 2018-11-19 RX ADMIN — CLONAZEPAM 0.5 MG: 0.5 TABLET ORAL at 16:29

## 2018-11-19 RX ADMIN — PAROXETINE 20 MG: 20 TABLET, FILM COATED ORAL at 16:29

## 2018-11-19 ASSESSMENT — ACTIVITIES OF DAILY LIVING (ADL)
LAUNDRY: WITH SUPERVISION
ORAL_HYGIENE: INDEPENDENT
GROOMING: INDEPENDENT
GROOMING: INDEPENDENT
ORAL_HYGIENE: INDEPENDENT
DRESS: INDEPENDENT
DRESS: STREET CLOTHES;SCRUBS (BEHAVIORAL HEALTH);INDEPENDENT

## 2018-11-19 NOTE — PROGRESS NOTES
"[facility], [place]   Psychiatric Progress Note        Interim History:   Reason for Hospitalization:Gwen is a 53 year old female with a history of depression, anxiety, OCD who is admitted on a voluntary basis for worsening depressive moods, lack of oral intake, and worsened obsessive/compulsive symptoms.     Subjective: \" I want to get better, and want to be on the right medications\"     As per today's interview: The patient was seen out of her room more today (compared to last week), relaxing in her room, and sitting by herself in the milieu, along with socialized with peers. She denied any SI/intent or plan. She wanted to be on the right medications, and get her depression and OCD more in control. Mentions that she's been making efforts in increasing her oral intake.          Medications:       [START ON 11/20/2018] buPROPion  100 mg Oral Daily with lunch     clonazePAM  0.5 mg Oral Daily     influenza vaccine adult (product based on age)  0.5 mL Intramuscular Prior to discharge     levothyroxine  25 mcg Oral QAM AC     mirtazapine  7.5 mg Oral At Bedtime     [START ON 11/20/2018] PARoxetine  20 mg Oral Daily with lunch     risperiDONE  1 mg Oral At Bedtime          Allergies:     Allergies   Allergen Reactions     Metronidazole Unknown     Abstracted data , patient cannot remember      Penicillins Unknown     Abstracted data , patient cannot remember           Labs:     Recent Results (from the past 48 hour(s))   Basic metabolic panel    Collection Time: 11/18/18  7:39 AM   Result Value Ref Range    Sodium 140 133 - 144 mmol/L    Potassium 4.1 3.4 - 5.3 mmol/L    Chloride 106 94 - 109 mmol/L    Carbon Dioxide 30 20 - 32 mmol/L    Anion Gap 4 3 - 14 mmol/L    Glucose 92 70 - 99 mg/dL    Urea Nitrogen 14 7 - 30 mg/dL    Creatinine 0.81 0.52 - 1.04 mg/dL    GFR Estimate 74 >60 mL/min/1.7m2    GFR Estimate If Black 89 >60 mL/min/1.7m2    Calcium 8.8 8.5 - 10.1 mg/dL   Magnesium    " "Collection Time: 11/18/18  7:39 AM   Result Value Ref Range    Magnesium 2.5 (H) 1.6 - 2.3 mg/dL   Phosphorus    Collection Time: 11/18/18  7:39 AM   Result Value Ref Range    Phosphorus 4.0 2.5 - 4.5 mg/dL   Basic metabolic panel    Collection Time: 11/19/18  8:07 AM   Result Value Ref Range    Sodium 141 133 - 144 mmol/L    Potassium 4.0 3.4 - 5.3 mmol/L    Chloride 106 94 - 109 mmol/L    Carbon Dioxide 30 20 - 32 mmol/L    Anion Gap 5 3 - 14 mmol/L    Glucose 83 70 - 99 mg/dL    Urea Nitrogen 14 7 - 30 mg/dL    Creatinine 0.74 0.52 - 1.04 mg/dL    GFR Estimate 82 >60 mL/min/1.7m2    GFR Estimate If Black >90 >60 mL/min/1.7m2    Calcium 8.9 8.5 - 10.1 mg/dL   Magnesium    Collection Time: 11/19/18  8:07 AM   Result Value Ref Range    Magnesium 2.5 (H) 1.6 - 2.3 mg/dL   Phosphorus    Collection Time: 11/19/18  8:07 AM   Result Value Ref Range    Phosphorus 3.1 2.5 - 4.5 mg/dL          Psychiatric Examination:   /82  Pulse 89  Temp 96.5  F (35.8  C)  Resp 16  Ht 1.575 m (5' 2\")  Wt 43.4 kg (95 lb 9.6 oz)  SpO2 98%  BMI 17.49 kg/m2  Weight is 95 lbs 9.6 oz  Body mass index is 17.49 kg/(m^2).    Appearance:  female. Thin, malnourished appearing. Pale skin. Appears fatigued. Unkept/disheveled appearance. No acute distress.   Attitude:  Attentive, cooperative, however anxious and tends to ask the same questions repeatedly.   Eye Contact:  fair  Mood:  anxious   Affect:  Improved range today.   Speech:  clear, coherent  Language: fluent and intact in English  Psychomotor, Gait, Musculoskeletal:  no evidence of tardive dyskinesia, dystonia, or tics  Throught Process:  logical  Associations:  no loose associations  Thought Content:  no evidence of suicidal ideation or homicidal ideation, no evidence of psychotic thought, no auditory hallucinations present and no visual hallucinations present  Insight:  partial  Judgement:  Fair-Poor   Oriented to:  time, person, and place  Attention Span and " Concentration:  fair/limited   Recent and Remote Memory:  fair  Fund of Knowledge:  appropriate      Assessment & Plan       Principal Diagnosis:   Major Depressive Disorder, recurrent, severe  OCD  Illness Anxiety Disorder  Obsessive Compulsive Personality Disorder   Subclinical Hypothyroidism     Assessment:   (Initial Assessment): Patient appears to be experiencing worsening neurovegetative symptoms of depression, particularly apathy, avolition/amotivation, anergy, along with a serious weight loss within a short amount of time (4-5 months). Her weight loss appears to be related to her depression, but malignant etiology should be ruled out at some point. She also has worsened compulsions/obsessions characteristic of OCD. We discussed the need to initiate medications to help augment her mood, sleep and weight gain. Discussed starting Risperdal to help with OCD and depression augmentation, and Remeron for additional sleep/appetite improvement. Will work with medicine PA to assess for refeeding issues as time goes on, along with consult with nutritionist for recommendations on improved caloric intake.     Update 11/19: Improved oral intake (claims to be eating lunch and dinner most days, and drinking boost during meals). Had a long discussion about medications, during which she has many questions and kept on ruminating over the same issue over and over. Spoke to pt's daughter about plan. Pt's Mg and electrolytes appear to have normalized. Pt refusing Risperdal. Discussed starting Wellbutrin for her amotivational, apathetic symptoms, along with the added benefit of smoking cessation. Discussed ECT, but patient refused at this time.      Plan:  1.) Medication Plan:  - Start Wellbutrin SR 75 mg today, then 100 mg tomorrow    - Remeron 7.5 mg HS  - Paxil 20 mg  - Klonopin 0.5 mg Daily     2.) Psychosocial Plan:  - Work with [facility] to help arrange appropriate follow up care  - Nutritionist Consult      Legal:  Voluntary (but  hold-able)      Disposition:  Unclear, pending stabilization - possibly in 1 week or so.       Safety Assessment:   Checks: Status 15  Precautions: None  Pt has not required locked seclusion or restraints in the past 24 hours to maintain safety, please refer to RN documentation for further details.       The risks, benefits, alternatives and side effects have been discussed and are understood by the patient and other caregivers.         Attestation:  Patient has been seen and evaluated by me,  Wei Powell MD

## 2018-11-19 NOTE — PLAN OF CARE
Brief Medicine Note  November 19, 2018    Mg, Phos wnl. Medicine will sign off.     Paulina Conn PA-C

## 2018-11-19 NOTE — PROGRESS NOTES
Pt has been out of her room a lot this shift. Pt has been socializing with peers and this writer. Pt needs a shower and was encouraged to take a shower on the day and evening shifts. Pt put it off both times; one plan was to shower after reflection time. Pt had 3 visitors at 1900-her daughter, her son, and son's girlfriend.Pt denies SI and SIB thoughts. Affect is improving a lot since admission.     11/18/18 2120   Behavioral Health   Hallucinations denies / not responding to hallucinations   Thinking (improving)   Orientation person: oriented;place: oriented;date: oriented;time: oriented   Memory baseline memory   Insight insight appropriate to situation   Judgement (improving)   Eye Contact at examiner   Affect full range affect   Mood mood is calm   Physical Appearance/Attire appears stated age   Hygiene neglected grooming - unclean body, hair, teeth   Suicidality (denies)   1. Wish to be Dead No   2. Non-Specific Active Suicidal Thoughts  No   Self Injury (denies)   Elopement (WDL) WDL   Activity (WDL) WDL   Speech coherent;clear   Medication Sensitivity no stated side effects;no observed side effects   Psychomotor / Gait balanced;steady   Activities of Daily Living   Hygiene/Grooming independent   Oral Hygiene independent   Dress street clothes   Laundry with supervision   Room Organization independent

## 2018-11-19 NOTE — PROGRESS NOTES
"Pt was isolative/withdrawn to her room during the day but attended meals in the dining room. She did not attend groups and was not social with other pts. She was calm during the day. She denies SI, SIB, racing thoughts, pain, and HI (auditory/visual). She rates her appetite as \"poor due to the depression\" and her sleep \"good\". She shares, \"I feel depressed and anxious and I would rate both of those at a 7/10 with 10 being the worse. I can't pinpoint the source of my depression and anxiety because there is so much stress and stuff going on. I just feel sad and anxious.\" Pt hopes to get her medication adjusted to help her reduce her depression and anxiety during her stay here. No other concerns from pt.       11/19/18 1421   Behavioral Health   Hallucinations denies / not responding to hallucinations   Thinking distractable   Orientation person: oriented;place: oriented;date: oriented;time: oriented   Memory baseline memory   Insight insight appropriate to situation;insight appropriate to events   Judgement impaired   Eye Contact at examiner   Affect blunted, flat   Mood mood is calm   Physical Appearance/Attire appears stated age   Hygiene neglected grooming - unclean body, hair, teeth   Suicidality other (see comments)  (denies)   1. Wish to be Dead No   2. Non-Specific Active Suicidal Thoughts  No   Self Injury other (see comment)  (denies)   Elopement (none stated/observed)   Activity withdrawn;isolative   Speech clear;coherent   Psychomotor / Gait steady;balanced   Activities of Daily Living   Hygiene/Grooming independent   Oral Hygiene independent   Dress independent   Laundry with supervision   Room Organization independent   Behavioral Health Interventions   Depression build upon strengths;assist with developing and utilizing healthy coping strategies;establish therapeutic relationship;provide emotional support;encourage participation / independence with adls;encourage nutrition and hydration;assist patient in " following safety plan;assist patient in developing safety plan;maintain safe secure environment;monitor need to revise level of observation;maintain safety precautions   Social and Therapeutic Interventions (Depression) encourage socialization with peers;encourage effective boundaries with peers;encourage participation in therapeutic groups and milieu activities

## 2018-11-19 NOTE — DISCHARGE INSTRUCTIONS
Behavioral Discharge Planning and Instructions      Summary:  You were admitted on 11/16/2018  due to Obsessive Compulsive Disorder (OCD) and depression.  You were treated by Dr Wei Powell MD and discharged on 1/11/19 from [facility] to mother's home.     Principal Diagnoses: Major Depressive Disorder, recurrent, severe  Obsessive Compulsive Disorder     Health Care Follow-up Appointments:   - Medication Management:   Mercedes Aguero DNP  [facility]  [address]  [place] [address]  Phone:  [phone]  Fax:  [phone] [facility] PLEASE FAX DISCHARGE INSTRUCTIONS   *You have a psychiatric medication management with Dr. Mercedes Aguero DNP scheduled for Monday, February 4th @ 2:50 pm    [address] [address]  Phone: [phone]  Preferred Provider: Lisy Blum PA-C  *[facility] has made attempts to schedule appointment with the preferred provider, however, Lisy Blum PA-C is not taking new patient referrals at this time, and needs to approve all new patient referrals and she is out of the office this week. Please follow-up with Lisy Blum PA-C week of January 14th to see if she has approved you as a new patient. Thank you.     We are recommending you seek intensive treatment for your OCD/Anxiety Symptoms, you have completed an initial phone screening with them, and they will be contacting you regarding an intake date for their program. Here is Rafiq's  information, please call them to follow-up:   [facility] [address], [place] [address] Phone: [phone] Fax: [phone] [facility] PLEASE FAX DISCHARGE INSTRUCTIONS   *Also see their website for more information on their OCD Anxiety Partial Hospitalization Program [email or web address]     **Please call [email or web address]: [phone] to set-up transportation services for all health care appointments    If you would like to apply for a [facility]  Mental Health , please call [facility] @ [phone]    To apply for Social Security Disability Benefits please visit your local [place] [employer] office @ [address], [place] [address] OR call [phone] OR visit [email or web address]     Attend all scheduled appointments with your outpatient providers. Call at least 24 hours in advance if you need to reschedule an appointment to ensure continued access to your outpatient providers.   Major Treatments, Procedures and Findings:  You were provided with: a psychiatric assessment, assessed for medical stability, medication evaluation and/or management, group therapy and milieu management    Symptoms to Report: feeling more aggressive, increased confusion, losing more sleep, mood getting worse or thoughts of suicide    Early warning signs can include: increased depression or anxiety sleep disturbances increased thoughts or behaviors of suicide or self-harm  increased unusual thinking, such as paranoia or hearing voices    Safety and Wellness:  Take all medicines as directed.  Make no changes unless your doctor suggests them.      Follow treatment recommendations.  Refrain from alcohol and non-prescribed drugs.  If there is a concern for safety, call [phone].    Resources:   Crisis Intervention: 120.340.8506 or 444-814-0305 (TTY: 114.533.7586).  Call anytime for help.  National [place] on Mental Illness (www.[place].la[place]ie.org): 485.388.7547 or 481-000-6343.  Alcoholics Anonymous (www.alcoholics-anonymous.org): Check your phone book for your local chapter.  Suicide Awareness Voices of Education (SAVE) (www.save.org): 619-469-XPGE (3483)  National Suicide Prevention Line (www.mentalhealthmn.org): 504-423-YGFI (8037)  Mental Health Consumer/Survivor Network of [place] (www.mhcsn.net): 722.580.8753 or 511-514-6057  Mental Health Association of [place] (www.mentalhealth.org): 169.153.3074 or 899-013-2853  [facility] Crisis (COPE) Response -  Adult [phone]    The treatment team has appreciated the opportunity to work with you.     Please take care and make your recovery a daily recovery.   If you have any questions or concerns our unit number is [phone].  Thank you.

## 2018-11-20 PROCEDURE — 99233 SBSQ HOSP IP/OBS HIGH 50: CPT | Performed by: PSYCHIATRY & NEUROLOGY

## 2018-11-20 PROCEDURE — 25000132 ZZH RX MED GY IP 250 OP 250 PS 637: Performed by: PSYCHIATRY & NEUROLOGY

## 2018-11-20 PROCEDURE — 25000132 ZZH RX MED GY IP 250 OP 250 PS 637: Performed by: PHYSICIAN ASSISTANT

## 2018-11-20 PROCEDURE — 25000132 ZZH RX MED GY IP 250 OP 250 PS 637: Performed by: EMERGENCY MEDICINE

## 2018-11-20 PROCEDURE — 12400007 ZZH R&B MH INTERMEDIATE UMMC

## 2018-11-20 PROCEDURE — 25000132 ZZH RX MED GY IP 250 OP 250 PS 637

## 2018-11-20 RX ORDER — BUPROPION HCL 100 MG
50 TABLET ORAL 2 TIMES DAILY
Status: DISCONTINUED | OUTPATIENT
Start: 2018-11-20 | End: 2018-11-20

## 2018-11-20 RX ORDER — BUPROPION HYDROCHLORIDE 100 MG/1
100 TABLET ORAL 2 TIMES DAILY
Status: DISCONTINUED | OUTPATIENT
Start: 2018-11-20 | End: 2018-11-23

## 2018-11-20 RX ORDER — PAROXETINE HCL 10 MG
20 TABLET ORAL
Status: DISCONTINUED | OUTPATIENT
Start: 2018-11-20 | End: 2018-11-23

## 2018-11-20 RX ORDER — PAROXETINE 20 MG/1
20 TABLET, FILM COATED ORAL
Status: DISCONTINUED | OUTPATIENT
Start: 2018-11-21 | End: 2018-11-20 | Stop reason: RX

## 2018-11-20 RX ADMIN — BUPROPION HYDROCHLORIDE 100 MG: 100 TABLET, FILM COATED ORAL at 20:44

## 2018-11-20 RX ADMIN — LEVOTHYROXINE SODIUM 25 MCG: 25 TABLET ORAL at 09:16

## 2018-11-20 RX ADMIN — CLONAZEPAM 0.5 MG: 0.5 TABLET ORAL at 14:42

## 2018-11-20 RX ADMIN — BUPROPION HYDROCHLORIDE 100 MG: 100 TABLET, FILM COATED ORAL at 14:39

## 2018-11-20 RX ADMIN — Medication 20 MG: at 14:39

## 2018-11-20 ASSESSMENT — ACTIVITIES OF DAILY LIVING (ADL)
ORAL_HYGIENE: INDEPENDENT
GROOMING: INDEPENDENT
DRESS: STREET CLOTHES
ORAL_HYGIENE: INDEPENDENT
GROOMING: INDEPENDENT
DRESS: STREET CLOTHES

## 2018-11-20 NOTE — PLAN OF CARE
Problem: Patient Care Overview  Goal: Team Discussion  Team Plan:   BEHAVIORAL TEAM DISCUSSION    Participants: Ramón Urban MD, Janey FLORES and Constanza MCKEON RN   Progress: Pt has been slightly progressing.   Continued Stay Criteria/Rationale: Pt will discharge once mental health stabilization has been reached. Medication changes are still being made.   Medical/Physical: See medical consult if applicable.   Precautions:   Behavioral Orders   Procedures     Code 1 - Restrict to Unit     Routine Programming     As clinically indicated     Status 15     Every 15 minutes.     Plan: PCP appointment will be made once we know of a discharge date.   Rationale for change in precautions or plan: No change, continue with plan of care.

## 2018-11-20 NOTE — PROGRESS NOTES
[facility] [place]   Psychiatric Progress Note  Gwen Cash  [id number]  11/20/18    Chief Complaint: Continued medical care  Time: 38 minutes on encounter, >50% of which was spent in counseling and/or coordination of care consisting of: communication and education with the patient/family, lab/image/study evaluation, support staff communication, and other sources pertinent to excellent patient care.          Interim History:   The patient's care was discussed with the treatment team during the daily team meeting and/or staff's chart notes were reviewed.  Staff report patient has not been eating very much organizing her room and says she still has depression and anxiety symptoms.  Slept about 5 hours overnight and needs to be able to chew her medications.    Upon meeting with her she says that she has still had anxiety however her anxiety has not worsened with the addition of bupropion.  She has not noticed any side effects and has been on her other medications for a long time.  She is not interested in taking the mirtazapine medication at night.  Denies any thoughts of harming herself or others any hallucinations or paranoia and her appetite seems to be increasing.  She repetitively asks if I believe that she has cancer and needs reassurance.  She feels as though her appetite improvements is a good sign and possibly related to improvements in depression.  We additionally discussed extensively her current thyroid issues.  Denies any hopelessness or helplessness but does have attention concentration issues and wants to be getting better before she goes home.  Has some memory impairment and denies any sleep impairment.  Discussed importance of having a primary care physician, and further evaluation for preventative measures such as colonoscopy etc.  Had multiple questions about the medications and how they act.         Medications:       buPROPion  100 mg Oral BID     clonazePAM  0.5  "mg Oral Daily     influenza vaccine adult (product based on age)  0.5 mL Intramuscular Prior to discharge     levothyroxine  25 mcg Oral QAM AC     PAXIL  20 mg Oral Daily with lunch          Allergies:     Allergies   Allergen Reactions     Metronidazole Unknown     Abstracted data , patient cannot remember      Penicillins Unknown     Abstracted data , patient cannot remember           Labs:   No results found for this or any previous visit (from the past 24 hour(s)).       Psychiatric Examination:     /82  Pulse 89  Temp 98.2  F (36.8  C) (Oral)  Resp 16  Ht 1.575 m (5' 2\")  Wt 44 kg (96 lb 14.4 oz)  SpO2 98%  BMI 17.72 kg/m2  Weight is 96 lbs 14.4 oz  Body mass index is 17.72 kg/(m^2).  Lying Orthostatic BP: 124/71      Lying Orthostatic Pulse: 62 bpm      Sitting Orthostatic BP: 116/70      Sitting Orthostatic Pulse: 66 bpm      Standing Orthostatic BP: 129/72      Standing Orthostatic Pulse: 71 bpm       Weight over time:  Vitals:    11/16/18 0229 11/20/18 1321   Weight: 43.4 kg (95 lb 9.6 oz) 44 kg (96 lb 14.4 oz)       Orthostatic Vitals       Most Recent      Sitting Orthostatic /70 11/20 1237    Sitting Orthostatic Pulse (bpm) 66 11/20 1237    Standing Orthostatic /72 11/20 1237    Standing Orthostatic Pulse (bpm) 71 11/20 1237            Appearance:  female. Thin, malnourished appearing. Pale skin. Appears fatigued. Unkept/disheveled appearance. No acute distress.   Attitude:  Attentive, cooperative, however anxious and tends to ask the same questions repeatedly.   Eye Contact:  fair  Mood:  anxious   Affect:  Improved range today.   Speech:  clear, coherent  Language: fluent and intact in English  Psychomotor, Gait, Musculoskeletal:  no evidence of tardive dyskinesia, dystonia, or tics  Throught Process: Ruminative and circumstantial  Associations:  no loose associations  Thought Content:  no evidence of suicidal ideation or homicidal ideation, no evidence of psychotic " thought, no auditory hallucinations present and no visual hallucinations present  Insight:  partial  Judgement:  Fair-Poor   Oriented to:  time, person, and place  Attention Span and Concentration:  fair/limited   Recent and Remote Memory:  fair  Fund of Knowledge:  appropriate         Precautions:     Behavioral Orders   Procedures     Code 1 - Restrict to Unit     Routine Programming     As clinically indicated     Status 15     Every 15 minutes.          DIagnoses:     Major depressive disorder recurrent severe  Obsessive-compulsive disorder  Illness anxiety disorder           Assessment & Plan:     Gwen appears to have some impairment of her memory as well as attention and concentration and some neurovegetative symptoms of depression.  She is also severely focused on if she has underlying cancer though I cannot definitively tell her she does not.  We did discuss how it is beneficial that her appetite is improving and that she has not had side effects from initiation of medications and she should expect her energy and attention concentration to start improving.  I do not believe that she is holdable against her will at this point though this could be argued based on the level of impairment of memory and vegetative symptoms.  We continue her current treatment at this point in time.    Continue voluntary hospitalization  Changing bupropion from sustained release to short acting so she continue her medication 100 mg twice a day  Discontinuing mirtazapine as she does not take her nighttime medications and will continue to refuse this  Setting up with primary care    The risks, benefits, alternatives and side effects have been discussed and are understood by the patient and other caregivers.      Ramón Krishna  [facility]      The following document has been created with voice recognition software and may contain unintentional word substitutions.    Non clinically relevant CMS  requirements:  Clinical Global Impressions  First:  Considering your total clinical experience with this particular patient population, how severe are the patient's symptoms at this time?: 5 (11/20/18 1437)  Compared to the patient's condition at the START of treatment, this patient's condition is:: 3 (11/20/18 1437)  Most recent:  Considering your total clinical experience with this particular patient population, how severe are the patient's symptoms at this time?: 5 (11/20/18 1437)  Compared to the patient's condition at the START of treatment, this patient's condition is:: 3 (11/20/18 1437)

## 2018-11-20 NOTE — PROGRESS NOTES
"Pt was visible in the milieu for most of the shift, she was seen talking on the phone, talking with her two visitors in the dining room, and cleaning her room. At time she was preoccupied with organizing and cleaning her room. Upon check in pt stated \"I don't want to check in right now, I'm mad because someone has moved all of my stuff\". Pt was referring to some of her belongings being slightly repositioned during room checks. Writer asked if she could ask two questions about safety and she stated \"I'm not suicidal, I don't want to harm myself. I really just want to get better, I'm sick of feeling this way. I want to get better so I can go home and see my family\". Pt did apologize to writer towards the end of the shift, she said \"I'm really sorry for yelling at you earlier, I'm just really not feeling well\". Pt did eat all of her dinner tray this evening, but did not shower. Her personal hygiene is still being neglected. Overall pt had an ok shift.        11/19/18 2144   Behavioral Health   Hallucinations denies / not responding to hallucinations   Thinking intact   Orientation person: oriented;place: oriented   Insight insight appropriate to situation   Judgement impaired   Eye Contact at examiner   Affect full range affect   Mood anxious   Physical Appearance/Attire neat   Hygiene neglected grooming - unclean body, hair, teeth   Suicidality other (see comments)  (Denies)   1. Wish to be Dead No   2. Non-Specific Active Suicidal Thoughts  No   Self Injury (Denies, none observed)   Elopement (No statements made)   Activity isolative;withdrawn;restless   Speech clear;coherent   Psychomotor / Gait balanced;steady   Activities of Daily Living   Hygiene/Grooming independent   Oral Hygiene independent   Dress street clothes;scrubs (behavioral health);independent   Room Organization independent     "

## 2018-11-20 NOTE — PROGRESS NOTES
CLINICAL NUTRITION SERVICES - REASSESSMENT NOTE     Nutrition Prescription    RECOMMENDATIONS FOR MDs/PROVIDERS TO ORDER:  None today    Malnutrition Status:    Non-severe malnutrition in the context of environmental or social circumstances.    Recommendations already ordered by Registered Dietitian (RD):  Continue Boost Plus TID with meals (variety)    Future/Additional Recommendations:  Continue to monitor Magnesium level.  Monitor po intakes and wt trends. Consider need to add/discontinue supplement or adjust flavors pending trends and pt preferences.       EVALUATION OF THE PROGRESS TOWARD GOALS   Diet: Regular  Supplement: Boost Plus TID with meals (variety)  Intake: Noted to be eating well at meals + drinking Boost. Pt reports her good appetite to writer, though noted that appetite is low in other chart notes. Pt reports eating nearly all of meal at lunch and dinner, but tends to not eat much for breakfast (which is normal for patient). Drinking 3 Boost/day per pt. Tolerating diet well.      NEW FINDINGS   - Wt: No updated wt this admission. Per previous RD assessment, pt reported  lb. She states that in Jan/Feb, she went to the doctor and was 119 lbs. This is when she realized she was losing weight. She feels weight loss has been more rapid in the past few months. No recent wt history available in CareEverywhere, making it difficult to quantify wt loss.  - Labs: BUN 14 (WNL, trended up from 6), Mg 2.5 (H)  - Meds: Reviewed     MALNUTRITION  % Intake: < 75% for >/= 3 months (non-severe) overall trend  % Weight Loss: Unable to assess  Subcutaneous Fat Loss: Facial region: Mild-moderate  Muscle Loss: Temporal, Facial & jaw region and Thoracic region (clavicle, acromium bone, deltoid, trapezius, pectoral): Moderate  Fluid Accumulation/Edema: None noted  Malnutrition Diagnosis: Non-severe malnutrition in the context of environmental or social circumstances.    Previous Goals   Patient to consume % of  nutritionally adequate meal trays TID, or the equivalent with supplements/snacks.  Evaluation: Met    Previous Nutrition Diagnosis  Inadequate oral intake related to decreased appetite 2/2 altered mental status as evidenced by pt reports of poor po intake with subsequent weight loss.    Evaluation: Resolved    CURRENT NUTRITION DIAGNOSIS  Predicted inadequate nutrient intake (protein-energy) related to variable appetite as evidenced by reliance on po intakes to meet estimated needs with potential for decline.       INTERVENTIONS  Implementation  Medical food supplement therapy - continue Boost as ordered  Ordered wt check  Nutrition education: Encouraged meals TID (encouraged pt to eat at least bites of breakfast, or 1-2 meal items) + 3 Boost/day. Do not have updated wt at this time, but will continue to monitor.    Goals  Patient to consume % of nutritionally adequate meal trays TID, or the equivalent with supplements/snacks.    Monitoring/Evaluation  Progress toward goals will be monitored and evaluated per protocol.     Stephanie Trejo RD, LD  Unit pgr: [phone]

## 2018-11-20 NOTE — PROGRESS NOTES
"Pt has been resting most of the am.  She reports that she hasn't been sleeping well and she tends to be more of a \"night person\".  Pt has not attended groups.  She has had some interaction with peers.  Pt reports that her depression level and anxiety level is 5/6 of 10 with 10 the most.  Pt denies any SI or SIB thinking.  Pt was concerned about her weight and thought that she should be gaining more weight than she has.       11/20/18 1332   Behavioral Health   Hallucinations denies / not responding to hallucinations   Thinking distractable   Orientation person: oriented;place: oriented;date: oriented   Insight (limited)   Judgement (limited)   Eye Contact at examiner   Affect blunted, flat  (brightens on approach)   Mood depressed;anxious   Physical Appearance/Attire attire appropriate to age and situation   Hygiene neglected grooming - unclean body, hair, teeth   Suicidality (denies SI)   1. Wish to be Dead No   2. Non-Specific Active Suicidal Thoughts  No   Self Injury (denies SIB thinking)   Elopement (none observed)   Activity isolative;withdrawn   Speech clear;coherent   Psychomotor / Gait balanced;steady   Activities of Daily Living   Hygiene/Grooming independent   Oral Hygiene independent   Dress street clothes   Room Organization independent     "

## 2018-11-21 PROCEDURE — 99231 SBSQ HOSP IP/OBS SF/LOW 25: CPT | Performed by: PSYCHIATRY & NEUROLOGY

## 2018-11-21 PROCEDURE — 25000132 ZZH RX MED GY IP 250 OP 250 PS 637: Performed by: PHYSICIAN ASSISTANT

## 2018-11-21 PROCEDURE — 12400007 ZZH R&B MH INTERMEDIATE UMMC

## 2018-11-21 PROCEDURE — 25000132 ZZH RX MED GY IP 250 OP 250 PS 637

## 2018-11-21 PROCEDURE — 25000132 ZZH RX MED GY IP 250 OP 250 PS 637: Performed by: PSYCHIATRY & NEUROLOGY

## 2018-11-21 PROCEDURE — 25000132 ZZH RX MED GY IP 250 OP 250 PS 637: Performed by: EMERGENCY MEDICINE

## 2018-11-21 RX ADMIN — LEVOTHYROXINE SODIUM 25 MCG: 25 TABLET ORAL at 08:28

## 2018-11-21 RX ADMIN — Medication 20 MG: at 13:57

## 2018-11-21 RX ADMIN — BUPROPION HYDROCHLORIDE 100 MG: 100 TABLET, FILM COATED ORAL at 20:43

## 2018-11-21 RX ADMIN — BUPROPION HYDROCHLORIDE 100 MG: 100 TABLET, FILM COATED ORAL at 08:28

## 2018-11-21 RX ADMIN — CLONAZEPAM 0.5 MG: 0.5 TABLET ORAL at 13:57

## 2018-11-21 ASSESSMENT — ACTIVITIES OF DAILY LIVING (ADL)
ORAL_HYGIENE: INDEPENDENT
DRESS: STREET CLOTHES
GROOMING: INDEPENDENT
ORAL_HYGIENE: INDEPENDENT
LAUNDRY: WITH SUPERVISION
DRESS: INDEPENDENT
GROOMING: INDEPENDENT

## 2018-11-21 NOTE — PROGRESS NOTES
"Pt was in the milieu off and on through out the shift, she would sit in the dining room or spend time organizing her room. Her overall affect was full range, her mood was depressed and anxious. Pt endorsed feelings of anxiety and depression, pt rated both 6-7/10. Pt denied SI, SIB and psychotic symptoms. Pt continues to neglect hygiene cares, she was prompted by the nurse to shower. Pt's appetite is \"ok\", she ate all of her dinner. Pt stated sleep is ok but \"I still feel tired. It's hard to fall asleep\". Overall pt had an ok shift and there were no major concerns.        11/20/18 2100   Behavioral Health   Hallucinations denies / not responding to hallucinations   Thinking distractable   Orientation person: oriented;place: oriented   Insight admits / accepts;other (see comment)  (Limited)   Judgement impaired   Eye Contact at examiner   Affect full range affect   Mood depressed;anxious   Physical Appearance/Attire multiple layers;attire appropriate to age and situation   Hygiene neglected grooming - unclean body, hair, teeth   Suicidality (Denies)   1. Wish to be Dead No   2. Non-Specific Active Suicidal Thoughts  No   Self Injury (Denies)   Elopement (None observed, no statements made)   Activity isolative;withdrawn   Speech clear;coherent   Psychomotor / Gait balanced;steady   Activities of Daily Living   Hygiene/Grooming independent   Oral Hygiene independent   Dress street clothes   Room Organization independent     "

## 2018-11-21 NOTE — PROGRESS NOTES
Writer met with Pt to discuss discharge planning. Pt signed ROIs for [facility] and [facility] (her sister). Pt has very specific requests for PCP---not willing to discuss discharge date with Writer. Pt requested Writer to not make intake appointment for PCP until a definite discharge date has been identified. See below of psychiatric medication management made, and Pt's requests for PCP. Pt has extreme body odor and was asked to take a shower.     - Medication Management:   Mercedes Aguero DNP  [facility]  [address] [place] [address] [address]  [place] [address]  Phone:  [phone]  Fax:  [phone] [facility] PLEASE FAX DISCHARGE INSTRUCTIONS   *You have a psychiatric medication management with Dr. Mercedes Aguero DNP scheduled for Thursday, December 20th at 1:30 pm    [facility]   [address] [place] [address] [place] [address]  Phone: [phone]  Physician: Lisy Blum PA-C (preferred provider)

## 2018-11-21 NOTE — PROGRESS NOTES
[facility], [place]   Psychiatric Progress Note      Impression:   Gwen Cash is a 53 year old female admitted for severe depression and weight loss of 30 lbs.  We are adjusting medications to target mood.  We are also working with the patient on therapeutic skill building.           Diagnoses and Plan:     Principal Diagnosis: MDD, recurrent, severe without psychotic features.  Unit: station 10  Attending: Santana  Medications: risks/benefits discussed with patient-    Paxil 20mg daily, Wellbutrin 100mg BID, klonopin 0.5mg HS synthroid 25 mcg daily  Laboratory/Imaging:  - Upreg neg, CBC wnl and BMP WNL  Consults:  - as needed  Patient will be treated in therapeutic milieu with appropriate individual and group therapies as described.  Medical diagnoses to be addressed this admission:   Weight loss of 30 lbs    Legal Status: Voluntary    Safety Assessment:   Checks: Status 15  Precautions: Suicide  Pt has not required locked seclusion or restraints in the past 24 hours to maintain safety, please refer to RN documentation for further details.    The risks, benefits, alternatives and side effects have been discussed and are understood by the patient and other caregivers.     Target symptoms to stabilize: SI, depressed, neurovegetative symptoms, sleep issues and disordered eating  Target disposition: outpatient provider    Attestation:  Patient has been seen and evaluated by me,  Joe Dillon MD          Interim History:   The patient's care was discussed with the treatment team and chart notes were reviewed.    Pt reports that she is feeling about the same as yesterday. She denies side effect from Wellbutrin.   She appears very emaciated. She says that she has been on Paxil 20mg for 20 years and it works for her.   She denies AH, VH. She denies stating that SI has never been a problem for her and does not have history of suicide attempt. Anxiety is prominent. She noemi worsening of  "anxiety with Wellbutrin.   But neurovegetative symptoms is significant    So far she has not been leaving her room much, just staying to herself.     Writer recommended to pt that we increase Paxil ,she was interested in this option, but she said she wants to wait for Dr. Powell and discuss this with him before making any change.    The 10 point Review of Systems is negative other than noted in the HPI         Medications:       buPROPion  100 mg Oral BID     clonazePAM  0.5 mg Oral Daily     influenza vaccine adult (product based on age)  0.5 mL Intramuscular Prior to discharge     levothyroxine  25 mcg Oral QAM AC     PAXIL  20 mg Oral Daily with lunch             Allergies:     Allergies   Allergen Reactions     Metronidazole Unknown     Abstracted data , patient cannot remember      Penicillins Unknown     Abstracted data , patient cannot remember             Psychiatric Examination:   /82  Pulse 89  Temp 97.9  F (36.6  C) (Oral)  Resp 16  Ht 1.575 m (5' 2\")  Wt 44 kg (96 lb 14.4 oz)  SpO2 98%  BMI 17.72 kg/m2  Weight is 96 lbs 14.4 oz  Body mass index is 17.72 kg/(m^2).    Appearance:  Appears despondent, emaciated, appropriately groomed,   Attitude:  cooperative  Eye Contact:  fair  Mood:  anxious and depressed  Affect:  constricted mobility  Speech:  clear, coherent  Psychomotor Behavior:  no evidence of tardive dyskinesia, dystonia, or tics and intact station, gait and muscle tone  Thought Process:  linear  Associations:  no loose associations  Thought Content:  no evidence of suicidal ideation or homicidal ideation, no evidence of psychotic thought, no auditory hallucinations present and no visual hallucinations present  Insight:  fair  Judgment:  fair  Oriented to:  time, person, and place  Attention Span and Concentration:  fair  Recent and Remote Memory:  fair  Language: Able to name objects  Fund of Knowledge: appropriate  Muscle Strength and Tone: normal  Gait and Station: Normal         " Labs:   No results found for this or any previous visit (from the past 24 hour(s)).

## 2018-11-21 NOTE — PROGRESS NOTES
"Pt slept through breakfast; \"I think the thyroid medication screwed up my appetite\". Pt ate 90% of lunch but said, \"My appetite is worse today\". Pt continues to not shower and has body odor. Pt denies SI and SIB thoughts. Pt was out of her room mid-day and did socialize with peers but did not attend groups. Pt's affect is improving but she said that she feels \"the same\"     11/21/18 2597   Behavioral Health   Hallucinations denies / not responding to hallucinations   Thinking distractable   Orientation person: oriented;place: oriented;date: oriented;time: oriented   Memory baseline memory   Insight insight appropriate to situation   Judgement impaired   Eye Contact at examiner   Affect (mid-range)   Mood mood is calm   Physical Appearance/Attire appears stated age;untidy;disheveled   Hygiene neglected grooming - unclean body, hair, teeth;body odor   Suicidality (denies)   1. Wish to be Dead No   2. Non-Specific Active Suicidal Thoughts  No   Self Injury (denies)   Elopement (WDL) WDL   Activity withdrawn   Speech coherent;clear   Medication Sensitivity no stated side effects;no observed side effects   Psychomotor / Gait balanced;steady;slow   Activities of Daily Living   Hygiene/Grooming independent   Oral Hygiene independent   Dress street clothes   Laundry with supervision   Room Organization independent    as on admission.  "

## 2018-11-21 NOTE — PLAN OF CARE
"Problem: Depressive Symptoms  Goal: Depressive Symptoms  Signs and symptoms of listed problems will be absent or manageable.   Outcome: No Change  Patient has extreme body odor. This writer approached her in her room and asked if she would take a shower tonight. She refused. She stated she would not shower because her daughter did not bring in new clothing and she had to put on clothing that fit. I asked her if she would put on scrubs. She refused. She again stated they did not fit her. This writer in a delicate way told her she had body odor and she needed to shower. She changed the subject and started talking about her medications. She was asked when she last showered and she stated Friday. Of note the reports state she has not showered for one month. She had 2 visitors tonight. She is social with peers and staff. She later came to me and asked if other patients had complained about her.  This writer stated \"yes\" and she said \"well its not my fault I've been thru a lot. She then asked about Wellbutrin again and this writer explained it to her again and asked her if she read the handout that was given to her. She said \"no, but I suppose I should\".  She ate all of her dinner.       "

## 2018-11-22 PROCEDURE — 25000132 ZZH RX MED GY IP 250 OP 250 PS 637: Performed by: EMERGENCY MEDICINE

## 2018-11-22 PROCEDURE — 25000132 ZZH RX MED GY IP 250 OP 250 PS 637: Performed by: PHYSICIAN ASSISTANT

## 2018-11-22 PROCEDURE — 25000132 ZZH RX MED GY IP 250 OP 250 PS 637

## 2018-11-22 PROCEDURE — 12400007 ZZH R&B MH INTERMEDIATE UMMC

## 2018-11-22 PROCEDURE — 25000132 ZZH RX MED GY IP 250 OP 250 PS 637: Performed by: PSYCHIATRY & NEUROLOGY

## 2018-11-22 RX ADMIN — CLONAZEPAM 0.5 MG: 0.5 TABLET ORAL at 14:48

## 2018-11-22 RX ADMIN — LEVOTHYROXINE SODIUM 25 MCG: 25 TABLET ORAL at 09:04

## 2018-11-22 RX ADMIN — BUPROPION HYDROCHLORIDE 100 MG: 100 TABLET, FILM COATED ORAL at 20:06

## 2018-11-22 RX ADMIN — BUPROPION HYDROCHLORIDE 100 MG: 100 TABLET, FILM COATED ORAL at 09:05

## 2018-11-22 RX ADMIN — Medication 20 MG: at 14:48

## 2018-11-22 ASSESSMENT — ACTIVITIES OF DAILY LIVING (ADL)
LAUNDRY: WITH SUPERVISION
DRESS: INDEPENDENT
ORAL_HYGIENE: INDEPENDENT
GROOMING: INDEPENDENT
GROOMING: TOTAL CARE

## 2018-11-22 NOTE — PROGRESS NOTES
Patent a bit fixated on a medication she is on and whether or not it was contributing to her elevated blood pressure.  Referred to nurse.  Patient says she knows she is very OCD about things like this as well as the organization of all of her belongings.  This was a source of irritation after a room check but she self regulated ans was fine. Patient well spoken and enjoyed her visit with family.

## 2018-11-22 NOTE — PROGRESS NOTES
Patient wanted author to document that no longer has an appetite and that this started 2 days ago.  Indicates will attempt to eat despite this.  Declined breakfast, ate majority of lunch.

## 2018-11-23 PROCEDURE — 25000132 ZZH RX MED GY IP 250 OP 250 PS 637: Performed by: EMERGENCY MEDICINE

## 2018-11-23 PROCEDURE — 25000132 ZZH RX MED GY IP 250 OP 250 PS 637: Performed by: PHYSICIAN ASSISTANT

## 2018-11-23 PROCEDURE — 99232 SBSQ HOSP IP/OBS MODERATE 35: CPT | Performed by: PSYCHIATRY & NEUROLOGY

## 2018-11-23 PROCEDURE — 12400007 ZZH R&B MH INTERMEDIATE UMMC

## 2018-11-23 PROCEDURE — 25000132 ZZH RX MED GY IP 250 OP 250 PS 637: Performed by: PSYCHIATRY & NEUROLOGY

## 2018-11-23 RX ORDER — PAROXETINE 20 MG/1
40 TABLET, FILM COATED ORAL
Status: DISCONTINUED | OUTPATIENT
Start: 2018-11-26 | End: 2018-12-01 | Stop reason: RX

## 2018-11-23 RX ORDER — PAROXETINE HCL 10 MG
30 TABLET ORAL
Status: DISCONTINUED | OUTPATIENT
Start: 2018-11-24 | End: 2018-11-23

## 2018-11-23 RX ORDER — PAROXETINE 10 MG/1
30 TABLET, FILM COATED ORAL
Status: COMPLETED | OUTPATIENT
Start: 2018-11-23 | End: 2018-11-25

## 2018-11-23 RX ADMIN — LEVOTHYROXINE SODIUM 25 MCG: 25 TABLET ORAL at 09:33

## 2018-11-23 RX ADMIN — BUPROPION HYDROCHLORIDE 100 MG: 100 TABLET, FILM COATED ORAL at 09:34

## 2018-11-23 RX ADMIN — CLONAZEPAM 0.5 MG: 0.5 TABLET ORAL at 14:21

## 2018-11-23 RX ADMIN — PAROXETINE HYDROCHLORIDE 30 MG: 10 TABLET, FILM COATED ORAL at 14:22

## 2018-11-23 ASSESSMENT — ACTIVITIES OF DAILY LIVING (ADL)
ORAL_HYGIENE: INDEPENDENT
ORAL_HYGIENE: INDEPENDENT;PROMPTS
LAUNDRY: WITH SUPERVISION
GROOMING: INDEPENDENT
DRESS: STREET CLOTHES;INDEPENDENT
DRESS: STREET CLOTHES;INDEPENDENT
GROOMING: INDEPENDENT;PROMPTS

## 2018-11-23 NOTE — PLAN OF CARE
"Problem: Depressive Symptoms  Goal: Depressive Symptoms  Signs and symptoms of listed problems will be absent or manageable.   Outcome: Improving  \"I'm maybe doing a little better.\"  Indicates is eating better although has lost appetite the past 2 to 3 days.  Rates depression level at a 6-7 on a scale with 10 being the worst which is somewhat less.  Anxiety continues to fluctuate, rating this at a 6-7 on the same scale which is better as well.  Continues to deny suicidal ideation as this was not a problem.  Denies wish to be dead or self harm thoughts.  States focus and concentration are \"OK\", but admits to continued obsessive thoughts which feels are less.  Indicates was staying in bed a lot at home due to depression, is out of bed more here.  Sleep is \"OK\".  Feels Wellbutrin may be causing loss of appetite and discussed this with psychiatrist.  Is interested working with a therapist after discharge for added support and will continue to work with psychiatric NP.  Continues to not attend groups, nor is noted to socialize with peers.  Did not eat breakfast today, states will eat lunch and supper.  Spends most of time this morning in room resting in bed or writing notes.      "

## 2018-11-23 NOTE — PLAN OF CARE
Problem: General Plan of Care (Inpatient Behavioral)  Goal: Individualization/Patient Specific Goal (IP Behavioral)  The patient and/or their representative will achieve their patient-specific goals related to the plan of care.    The patient-specific goals include:   Illness Management Recovery model:  Taking Action.    Patient identified the following actions they can take when early warning signs are present in order to prevent relapse:    1. Will call someone from support network.  2. Will go for a walk.  3. Will use distraction.

## 2018-11-23 NOTE — PLAN OF CARE
"Problem: Depressive Symptoms  Goal: Depressive Symptoms  Signs and symptoms of listed problems will be absent or manageable.   Outcome: Improving  48 Hour Assessment    In milieu most of shift watching TV with peers. Denies SI/SIB. Affect blunted. Mood less anxious. Insight improved, \"I know I have OCD and I hate that about myself. I don't like obsessing about my medications. I don't like always having to double check that every thing in my room is in the right place\" Ate 100% of supper, states she is not hungry, \"I am making myself eat, I want to gain back the weight I have lost\"          "

## 2018-11-24 PROCEDURE — 25000132 ZZH RX MED GY IP 250 OP 250 PS 637: Performed by: PSYCHIATRY & NEUROLOGY

## 2018-11-24 PROCEDURE — 25000132 ZZH RX MED GY IP 250 OP 250 PS 637: Performed by: PHYSICIAN ASSISTANT

## 2018-11-24 PROCEDURE — 25000132 ZZH RX MED GY IP 250 OP 250 PS 637: Performed by: EMERGENCY MEDICINE

## 2018-11-24 PROCEDURE — 12400007 ZZH R&B MH INTERMEDIATE UMMC

## 2018-11-24 RX ADMIN — LEVOTHYROXINE SODIUM 25 MCG: 25 TABLET ORAL at 09:39

## 2018-11-24 RX ADMIN — PAROXETINE HYDROCHLORIDE 30 MG: 10 TABLET, FILM COATED ORAL at 14:38

## 2018-11-24 RX ADMIN — CLONAZEPAM 0.5 MG: 0.5 TABLET ORAL at 14:38

## 2018-11-24 ASSESSMENT — ACTIVITIES OF DAILY LIVING (ADL)
ORAL_HYGIENE: INDEPENDENT
GROOMING: PROMPTS
ORAL_HYGIENE: PROMPTS
GROOMING: INDEPENDENT
LAUNDRY: WITH SUPERVISION
DRESS: STREET CLOTHES
DRESS: STREET CLOTHES

## 2018-11-24 NOTE — PROGRESS NOTES
"Pt has been resting much of the shift.  She did not attend any groups and keeps to herself.  Pt reports that she tends to stay up later and sleeps in the morning.  Pt continues to have concerns regarding her lack of appetite, and that \"something is wrong with me\".  Pt reports that \"my OCD is really bad\".  Pt denies any SI or SIB thinking.  Pt reports that her depression level and her anxiety level is 7/8 of 10 with 10 the most.  Pt was tearful during interaction.  She continues to refuse to shower until her daughter \"brings pants that fit\".  Pt did not eat breakfast but at 85% of lunch.     11/24/18 1415   Behavioral Health   Hallucinations denies / not responding to hallucinations   Thinking distractable;poor concentration   Orientation person: oriented;place: oriented;date: oriented   Insight (limited)   Judgement (limited)   Eye Contact at examiner   Affect blunted, flat;sad   Mood depressed;anxious   Physical Appearance/Attire attire appropriate to age and situation   Hygiene neglected grooming - unclean body, hair, teeth   Suicidality (denies SI)   1. Wish to be Dead No   2. Non-Specific Active Suicidal Thoughts  No   Self Injury (denies SIB thinking)   Elopement (none observed)   Activity isolative;withdrawn;refusal   Speech clear;coherent   Psychomotor / Gait slow;balanced;steady   Activities of Daily Living   Hygiene/Grooming independent   Oral Hygiene independent   Dress street clothes   Room Organization independent     "

## 2018-11-24 NOTE — PROGRESS NOTES
"Pt was in her room for most of the shift, she came out for meals, visiting hours with her children, and for about an hour towards the end of the shift to watch TV. Her overall affect was blunted/flat but brightened upon approach. Her mood was anxious and depressed. Pt endorsed anxiety and depression she rated both 6-7/10. Pt also reported \"I was nervous today and I didn't know why\". Pt denied SI, SIB, and hallucinations. Pt appeared hopeful about her treatment plan. During meal time pt ate approximately 75% of her meal but she stated \"my appetite is not great, but I still put food in me to put on the weight\". Pt reported sleep is ok, \"I fell asleep late last night and woke up late today, but I felt rested\". Overall pt had a good shift and there were no major concerns.        11/23/18 2210   Behavioral Health   Hallucinations denies / not responding to hallucinations   Thinking distractable;poor concentration   Orientation person: oriented;place: oriented;date: oriented   Insight poor   Judgement impaired   Eye Contact at examiner   Affect blunted, flat  (Brightens upon approach)   Mood depressed;anxious   Physical Appearance/Attire multiple layers;disheveled;attire appropriate to age and situation   Hygiene neglected grooming - unclean body, hair, teeth   Suicidality (Denies)   1. Wish to be Dead No   2. Non-Specific Active Suicidal Thoughts  No   Self Injury (Denies)   Elopement (None observed, no statements made)   Activity isolative;withdrawn   Speech clear;coherent   Psychomotor / Gait balanced;steady   Activities of Daily Living   Hygiene/Grooming independent   Oral Hygiene independent   Dress street clothes;independent   Room Organization independent     "

## 2018-11-25 PROCEDURE — 12400007 ZZH R&B MH INTERMEDIATE UMMC

## 2018-11-25 PROCEDURE — 25000132 ZZH RX MED GY IP 250 OP 250 PS 637: Performed by: EMERGENCY MEDICINE

## 2018-11-25 PROCEDURE — 25000132 ZZH RX MED GY IP 250 OP 250 PS 637: Performed by: PSYCHIATRY & NEUROLOGY

## 2018-11-25 PROCEDURE — 25000132 ZZH RX MED GY IP 250 OP 250 PS 637: Performed by: PHYSICIAN ASSISTANT

## 2018-11-25 RX ADMIN — CLONAZEPAM 0.5 MG: 0.5 TABLET ORAL at 14:08

## 2018-11-25 RX ADMIN — PAROXETINE HYDROCHLORIDE 30 MG: 10 TABLET, FILM COATED ORAL at 14:08

## 2018-11-25 RX ADMIN — LEVOTHYROXINE SODIUM 25 MCG: 25 TABLET ORAL at 09:48

## 2018-11-25 ASSESSMENT — ACTIVITIES OF DAILY LIVING (ADL)
ORAL_HYGIENE: INDEPENDENT
DRESS: INDEPENDENT
HYGIENE/GROOMING: INDEPENDENT
LAUNDRY: UNABLE TO COMPLETE
ORAL_HYGIENE: PROMPTS
GROOMING: PROMPTS
DRESS: STREET CLOTHES

## 2018-11-25 NOTE — PLAN OF CARE
"Problem: Behavior Regulation (Impulse Control) Impairment (Obsessive-Compulsive Signs/Symptoms) (Adult)  Goal: Improved Behavior Regulation and Impulse Control (Obsessive-Compulsive Signs/Symptoms)  Outcome: Improving  Patient spent the majority of the shift in her room. Sleeping until about 11:30 am. Refused to eat breakfast. Patient got up for lunch and ate about 90% of her food and drank a boost. Patient continues to ruminate on subjects such as her medications, weight, appetite. States she has to force her food down as she has no appetite and she was upset over losing a few ounces. Writer assured patient that gaining back the weight she had lost would take time and that weight fluctuates. Patient started crying and stated \"I want my head to get better but I'm so scared that there's something physically wrong with me.\" Writer encouraged patient to establish a consistent sleep/wake pattern and increase physical activity during the day to improve appetite. Patient would like to speak with an internal medicine doctor again. Patient continues to give reasons why she can't shower but is getting closer to \"feeling up to it.\" She was able to look in the shower today which she hadn't before. Patient endorses anxiety and depression related to the above concerns. Writer educated patient about PRN gabapentin and hydroxyzine and offered for anxiety, patient declined. Denies suicidal ideation, plan, intent.   Plan: Assess readiness for change, affirm change-related statements and efforts, encourage participation in therapeutic milieu activities, monitor medication adherence and response      "

## 2018-11-25 NOTE — PLAN OF CARE
"Problem: Depressive Symptoms  Goal: Depressive Symptoms  Signs and symptoms of listed problems will be absent or manageable.   Outcome: No Change  48 hour nursing assessment:    Patient has a been visible in the milieu at intervals. She is calm and cooperative. She states she is feeling sad, depressed, and anxious. She stated she did not have a goal tonight. She denies SI and SIB. She denies AH and VH. She states her concentration is \"okay, not the greatest\" and she has no racing thoughts. She denies pain. She does not know if her medications are working yet. She endorses feeling safe and hopeful. Her coping skills are \"trying to put my mind on something else like walking or crying\". She has not concerns \"right now\". She rates her depression and anxiety 7-8. She states her only physical issues are \"sometimes I feel like I am off balance. Like a light headed dizziness. Like my equilibrium is off\". She ate %100 percent of her lunch and dinner but then states \"I don't have much of an appetite\". She did not go to groups and she has not taken a shower for one month and 10 days. Her family has come to visit all weekend and her excuse for not taking a shower is \"my daughter has to bring in pants that fit me\". Her body odor is extremely potent. She is disheveled and unkept with very oily hair. She is continuously tidying her room. She refuses to take any PRN's. She has multiple complaints such as \"my toes shake when I am sitting on the bed and left up my feet\".       "

## 2018-11-26 PROCEDURE — 25000132 ZZH RX MED GY IP 250 OP 250 PS 637: Performed by: EMERGENCY MEDICINE

## 2018-11-26 PROCEDURE — 12400007 ZZH R&B MH INTERMEDIATE UMMC

## 2018-11-26 PROCEDURE — 25000132 ZZH RX MED GY IP 250 OP 250 PS 637: Performed by: PHYSICIAN ASSISTANT

## 2018-11-26 PROCEDURE — 99232 SBSQ HOSP IP/OBS MODERATE 35: CPT | Performed by: PSYCHIATRY & NEUROLOGY

## 2018-11-26 RX ADMIN — CLONAZEPAM 0.5 MG: 0.5 TABLET ORAL at 15:17

## 2018-11-26 RX ADMIN — LEVOTHYROXINE SODIUM 25 MCG: 25 TABLET ORAL at 09:27

## 2018-11-26 RX ADMIN — PAROXETINE 40 MG: 20 TABLET, FILM COATED ORAL at 15:16

## 2018-11-26 ASSESSMENT — ACTIVITIES OF DAILY LIVING (ADL)
DRESS: INDEPENDENT
GROOMING: INDEPENDENT
LAUNDRY: WITH SUPERVISION
ORAL_HYGIENE: INDEPENDENT

## 2018-11-26 NOTE — PLAN OF CARE
"Problem: Patient Care Overview  Goal: Plan of Care/Patient Progress Review  Outcome: No Change  Patient has been isolative to her room for majority of the shift, only came out to eat lunch in the dining room. Does not socialize with peers. Is visibly anxious, tense. Endorses depression. Affect is flat, sad. Denies suicidal ideation, plan, intent.     Problem: Behavior Regulation (Impulse Control) Impairment (Obsessive-Compulsive Signs/Symptoms) (Adult)  Goal: Improved Behavior Regulation and Impulse Control (Obsessive-Compulsive Signs/Symptoms)  Outcome: No Change  Writer approached patient at about 1 pm to encourage her to eat lunch. Patient was worried about not having an appetite and continues to perseverate on losing 3 ounces from her last weight check. She stated that this was \"terrifying.\" Writer was able to convince her to come out and eat lunch and she finished about 75% of her food.     Problem: Social/Occupational/Functional Impairment (Obsessive-Compulsive Signs/Symptoms) (Adult)  Goal: Improved Social/Occupational/Functional Skills (Obsessive-Compulsive Signs/Symptoms)  Outcome: No Change  Patient has not been able to socialize with peers. States she wants to work toward being able to attend groups.       "

## 2018-11-26 NOTE — PROGRESS NOTES
[facility], [place]   Psychiatric Progress Note      Impression:   Gwen Cash is a 53 year old female admitted for severe depression and weight loss of 30 lbs.  We are adjusting medications to target mood.  We are also working with the patient on therapeutic skill building.             Diagnoses and Plan:       Principal Diagnosis: MDD, recurrent, severe without psychotic features. OCD  Unit: station 10  Attending: Santana  Medications: risks/benefits discussed with patient-    - Paxil  40mg daily for compulsive behaviors, anxiety, depression  -discontinued Wellbutrin 100mg BID due to worsening anxiety and affecting appetite.  -klonopin 0.5mg HS synthroid 25 mcg daily  Laboratory/Imaging:  - Upreg neg, CBC wnl and BMP WNL  Consults:  - Will get Internal medicine, to evaluate synthroid dose. ( slightly elevated TSH, normal FT4)---This consult was later cancelled. Pt was already seen and evaluated by Internal Medicine.  Patient will be treated in therapeutic milieu with appropriate individual and group therapies as described.  Medical diagnoses to be addressed this admission:   Weight loss of 30 lbs  Hypothyroidism      Legal Status: Voluntary      Safety Assessment:   Checks: Status 15  Precautions: Suicide  Pt has not required locked seclusion or restraints in the past 24 hours to maintain safety, please refer to RN documentation for further details.    The risks, benefits, alternatives and side effects have been discussed and are understood by the patient and other caregivers.     Target symptoms to stabilize: SI, depressed, neurovegetative symptoms, sleep issues and disordered eating  Target disposition: outpatient provider    Attestation:  Patient has been seen and evaluated by me,  Joe Dillon MD          Interim History:   The patient's care was discussed with the treatment team and chart notes were reviewed.    IN team meeting today, staff reports that patient was so worried  "about weight loss of 3 oz since yesterday.     On my interview, the pt was in the dark room, up and walking around. Pt said that she has been feeling tired and does not feel like getting out of bed, and wondering if this is due to Paxil increase. She asks if Wellbutrin was making her getting out of bed. Writer pointed out that that is possible, but also Wellbutrin was affecting appetite, too, and we have to balance pros and cons. Pt is worried about everything. She says she does not want to take anything at bedtime, to make her groggy. Apparently that is what happened with risperidone.   Writer tried to explain to pt that it takes a while for med to start working, so it is important not to make judgement on one medication quickly. She kept saying \" I don't know... I don't know.....\" and brought the notebook in which she writes everything, and reviewing what she has written.   She said \" I am not sure if I will get better here..\"    Her OCD symptoms are so severe, the patient seems emotionally paralyzed to do anything at this point.     An hour or so after the initial interview, the patient came to talk with me again, in the interview room. Basically she is worried about Paxil 40mg, despite the explanation that if she experiences sedated feeling more so than she does with 30mg, we will have to back down on the dose. She wanted to know how soon we will know, and writer explained that she will probably notice that side effect in a day or two.    As she wanted to see Internal Medicine, writer ordered consult, but later Internal Medicine PA staff came by and told writer that the patient has been already seen by Internal Medicine, and received detailed explanation about thyroid hormone treatment. We will cancel the consult. This time.      The 10 point Review of Systems is negative other than noted in the HPI         Medications:       clonazePAM  0.5 mg Oral Daily     influenza vaccine adult (product based on age)  0.5 " "mL Intramuscular Prior to discharge     levothyroxine  25 mcg Oral QAM AC     PARoxetine  40 mg Oral Daily with lunch             Allergies:     Allergies   Allergen Reactions     Metronidazole Unknown     Abstracted data , patient cannot remember      Penicillins Unknown     Abstracted data , patient cannot remember             Psychiatric Examination:   /65  Pulse 77  Temp 98.8  F (37.1  C) (Oral)  Resp 16  Ht 1.575 m (5' 2\")  Wt 44.1 kg (97 lb 4.8 oz)  SpO2 98%  BMI 17.8 kg/m2  Weight is 97 lbs 4.8 oz  Body mass index is 17.8 kg/(m^2).    Appearance:  awake, alert, appeared as age stated, distracted, severe distress and casually dressed  Attitude:  somewhat cooperative  Eye Contact:  fair  Mood:  anxious and depressed  Affect:  constricted mobility  Speech:  low volume  Psychomotor Behavior:  no evidence of tardive dyskinesia, dystonia, or tics and intact station, gait and muscle tone  Thought Process:  perseverative, obsessive  Associations:  no loose associations  Thought Content:  no evidence of suicidal ideation or homicidal ideation, no evidence of psychotic thought, no auditory hallucinations present, no visual hallucinations present and worries about everything, ( low appetite, weight gain not as much as she wants so far, feeling tired, etc)  Insight:  limited  Judgment:  limited  Oriented to:  time, person, and place  Attention Span and Concentration:  limited  Recent and Remote Memory:  fair  Language: Able to name objects  Fund of Knowledge: appropriate  Muscle Strength and Tone: normal  Gait and Station: Normal     Clinical Global Impressions  First:  Considering your total clinical experience with this particular patient population, how severe are the patient's symptoms at this time?: 5 (11/20/18 1437)  Compared to the patient's condition at the START of treatment, this patient's condition is:: 3 (11/20/18 1437)  Most recent:  Considering your total clinical experience with this " particular patient population, how severe are the patient's symptoms at this time?: 5 (11/20/18 1437)  Compared to the patient's condition at the START of treatment, this patient's condition is:: 3 (11/20/18 1437)         Labs:   No results found for this or any previous visit (from the past 24 hour(s)).

## 2018-11-26 NOTE — PROGRESS NOTES
11/25/18 2152   Behavioral Health   Hallucinations denies / not responding to hallucinations   Thinking distractable   Orientation person: oriented;place: oriented;date: oriented;time: oriented   Memory baseline memory   Insight poor   Judgement impaired   Eye Contact at examiner   Affect tense;blunted, flat   Mood mood is calm;anxious   Physical Appearance/Attire untidy   Hygiene neglected grooming - unclean body, hair, teeth   Suicidality other (see comments)  (none observed or reported )   1. Wish to be Dead No   2. Non-Specific Active Suicidal Thoughts  No   Self Injury other (see comment)  (none observed or reported )   Elopement (no behaviors observed )   Activity other (see comment);withdrawn  (visible in milieu, withdrawn from peer interaction )   Speech clear;coherent   Psychomotor / Gait balanced;steady   Activities of Daily Living   Hygiene/Grooming independent   Oral Hygiene independent   Dress independent   Room Organization independent

## 2018-11-27 PROCEDURE — 25000132 ZZH RX MED GY IP 250 OP 250 PS 637: Performed by: PSYCHIATRY & NEUROLOGY

## 2018-11-27 PROCEDURE — 25000132 ZZH RX MED GY IP 250 OP 250 PS 637: Performed by: PHYSICIAN ASSISTANT

## 2018-11-27 PROCEDURE — 99207 ZZC CDG-MDM COMPONENT: MEETS MODERATE - UP CODED: CPT | Performed by: PSYCHIATRY & NEUROLOGY

## 2018-11-27 PROCEDURE — 25000132 ZZH RX MED GY IP 250 OP 250 PS 637: Performed by: EMERGENCY MEDICINE

## 2018-11-27 PROCEDURE — 12400007 ZZH R&B MH INTERMEDIATE UMMC

## 2018-11-27 PROCEDURE — 99232 SBSQ HOSP IP/OBS MODERATE 35: CPT | Performed by: PSYCHIATRY & NEUROLOGY

## 2018-11-27 RX ORDER — BUPROPION HYDROCHLORIDE 75 MG/1
75 TABLET ORAL DAILY
Status: DISCONTINUED | OUTPATIENT
Start: 2018-11-27 | End: 2018-11-29

## 2018-11-27 RX ADMIN — CLONAZEPAM 0.5 MG: 0.5 TABLET ORAL at 14:00

## 2018-11-27 RX ADMIN — PAROXETINE 40 MG: 20 TABLET, FILM COATED ORAL at 14:00

## 2018-11-27 RX ADMIN — LEVOTHYROXINE SODIUM 25 MCG: 25 TABLET ORAL at 10:55

## 2018-11-27 RX ADMIN — BUPROPION HYDROCHLORIDE 75 MG: 75 TABLET, FILM COATED ORAL at 14:02

## 2018-11-27 ASSESSMENT — ACTIVITIES OF DAILY LIVING (ADL)
ORAL_HYGIENE: INDEPENDENT
DRESS: INDEPENDENT
HYGIENE/GROOMING: INDEPENDENT
LAUNDRY: WITH SUPERVISION
LAUNDRY: WITH SUPERVISION
ORAL_HYGIENE: INDEPENDENT
GROOMING: INDEPENDENT
DRESS: INDEPENDENT

## 2018-11-27 NOTE — PLAN OF CARE
Problem: Patient Care Overview  Goal: Team Discussion  Team Plan:    BEHAVIORAL TEAM DISCUSSION    Participants: Wei Powell MD, Janey FLOERS, and Cira APPLE RN  Progress: Pt has not been progressing, often isolative to room, malodorous and refuses to shower. Pt does not attend therapeutic group programming.   Continued Stay Criteria/Rationale: Pt continues to isolate herself from others. Pt needs further mental health stabilization before a discharge date is determined.   Medical/Physical: See medical consult if applicable.   Precautions:   Behavioral Orders   Procedures     Code 1 - Restrict to Unit     Routine Programming     As clinically indicated     Status 15     Every 15 minutes.     Plan: Pt needs further mental health stabilization. Pt will discharge home with increased mental health services in place.   Rationale for change in precautions or plan: No change, continue with plan.       Problem: General Plan of Care (Inpatient Behavioral)  Goal: Team Discussion  Team Plan:    BEHAVIORAL TEAM DISCUSSION    Participants: Wei Powell MD, Janey FLORES, and Cira APPLE RN  Progress: Pt has not been progressing, often isolative to room, malodorous and refuses to shower. Pt does not attend therapeutic group programming.   Continued Stay Criteria/Rationale: Pt continues to isolate herself from others. Pt needs further mental health stabilization before a discharge date is determined.   Medical/Physical: See medical consult if applicable.   Precautions:   Behavioral Orders   Procedures     Code 1 - Restrict to Unit     Routine Programming     As clinically indicated     Status 15     Every 15 minutes.     Plan: Pt needs further mental health stabilization. Pt will discharge home with increased mental health services in place.   Rationale for change in precautions or plan: No change, continue with plan.

## 2018-11-27 NOTE — PROGRESS NOTES
"[facility], [place]   Psychiatric Progress Note        Interim History:   Reason for Hospitalization:Gwen is a 53 year old female with a history of depression, anxiety, OCD who is admitted on a voluntary basis for worsening depressive moods, lack of oral intake, and worsened obsessive/compulsive symptoms.     Subjective: \" I want to get better, and want to be on the right medications\"     As per today's interview: The patient was seen in her room, tearful today. Mostly isolative today. She denied any SI/intent or plan. She is frustrated for continuing to have OCD symptoms and depressive symptoms. Willing to try new medications, but worried/fearful of side effects.          Medications:       acetylcysteine  500 mg Oral Daily     buPROPion  75 mg Oral Daily     clonazePAM  0.5 mg Oral Daily     influenza vaccine adult (product based on age)  0.5 mL Intramuscular Prior to discharge     levothyroxine  25 mcg Oral QAM AC     PARoxetine  40 mg Oral Daily with lunch          Allergies:     Allergies   Allergen Reactions     Metronidazole Unknown     Abstracted data , patient cannot remember      Penicillins Unknown     Abstracted data , patient cannot remember           Labs:     No results found for this or any previous visit (from the past 48 hour(s)).       Psychiatric Examination:   /65  Pulse 77  Temp 98.8  F (37.1  C) (Oral)  Resp 16  Ht 1.575 m (5' 2\")  Wt 44.1 kg (97 lb 4.8 oz)  SpO2 98%  BMI 17.8 kg/m2  Weight is 97 lbs 4.8 oz  Body mass index is 17.8 kg/(m^2).    Appearance:  female. Thin, malnourished appearing. Pale skin. Appears fatigued. Unkept/disheveled appearance. No acute distress.   Attitude:  Attentive, cooperative, however anxious and tends to ask the same questions repeatedly.   Eye Contact:  fair  Mood:  anxious   Affect:  Improved range today.   Speech:  clear, coherent  Language: fluent and intact in English  Psychomotor, Gait, Musculoskeletal:  " no evidence of tardive dyskinesia, dystonia, or tics  Throught Process:  logical  Associations:  no loose associations  Thought Content:  no evidence of suicidal ideation or homicidal ideation, no evidence of psychotic thought, no auditory hallucinations present and no visual hallucinations present  Insight:  partial  Judgement:  Fair-Poor   Oriented to:  time, person, and place  Attention Span and Concentration:  fair/limited   Recent and Remote Memory:  fair  Fund of Knowledge:  appropriate      Assessment & Plan       Principal Diagnosis:   Major Depressive Disorder, recurrent, severe  OCD  Illness Anxiety Disorder  Obsessive Compulsive Personality Disorder   Subclinical Hypothyroidism     Assessment:   (Initial Assessment): Patient appears to be experiencing worsening neurovegetative symptoms of depression, particularly apathy, avolition/amotivation, anergy, along with a serious weight loss within a short amount of time (4-5 months). Her weight loss appears to be related to her depression, but malignant etiology should be ruled out at some point. She also has worsened compulsions/obsessions characteristic of OCD. We discussed the need to initiate medications to help augment her mood, sleep and weight gain. Discussed starting Risperdal to help with OCD and depression augmentation, and Remeron for additional sleep/appetite improvement. Will work with medicine PA to assess for refeeding issues as time goes on, along with consult with nutritionist for recommendations on improved caloric intake.     Update 11/19: Improved oral intake (claims to be eating lunch and dinner most days, and drinking boost during meals). Had a long discussion about medications, during which she has many questions and kept on ruminating over the same issue over and over. Spoke to pt's daughter about plan. Pt's Mg and electrolytes appear to have normalized. Pt refusing Risperdal. Discussed starting Wellbutrin for her amotivational, apathetic  symptoms, along with the added benefit of smoking cessation. Discussed ECT, but patient refused at this time.     ------    Update 11/27: After an initial period of some improved oral intake and some increased behavior drive, she seems to have worsened. In there interim, Wellbutrin was discontinued, Paxil was increased. She has been mostly isolative in her room, tearful, ruminative about various worries, and decreased oral intake. I discussed adding back on a small dose of Wellbutrin since it seemed to improve her energy and volitional drive (as per patient). I will also add on N-acetylcysteine capsules to provide adjuctive treatment for her OCD. I continued to discuss ECT for her treatment resistant depression and OCD.      Plan:  1.) Medication Plan:  - Wellbutrin 75 mg today - for neurovegetative depressive symptoms   -  N-acetylcysteine capsules - for adjunctive treatment for OCD    - Remeron 7.5 mg HS  - Paxil 40 mg - can be increased by 10 mg a week up to 60 mg .   - Klonopin 0.5 mg Daily  - Continue to discuss ECT - for treatment resistant depression and OCD.      2.) Psychosocial Plan:  - Work with [facility] to help arrange appropriate follow up care  - Nutritionist Consult (recommend they follow up)      Legal:  Voluntary (but hold-able)      Disposition:  Unclear, pending stabilization - possibly in 1 week or so.       Safety Assessment:   Checks: Status 15  Precautions: None  Pt has not required locked seclusion or restraints in the past 24 hours to maintain safety, please refer to RN documentation for further details.       The risks, benefits, alternatives and side effects have been discussed and are understood by the patient and other caregivers.         Attestation:  Patient has been seen and evaluated by me,  Wei Powell MD

## 2018-11-27 NOTE — PLAN OF CARE
Problem: Emotion/Mood Impairment (Obsessive-Compulsive Signs/Symptoms) (Adult)  Goal: Improved Mood Symptoms (Obsessive-Compulsive Signs/Symptoms)  Outcome: No Change  Patient was isolative to her room for majority of the shift, sleeping until almost 1300. Patient states she doesn't feel like getting out of bed and has no appetite. Refused to eat lunch. Writer offered her several food options and she refused them all. Patient has been drinking water. New medications were prescribed to patient (N-acetylcysteine and wellbutrin) and she is extremely anxious about taking them. Writer provided education regarding side effects. Patient was medication compliant but needed much reassurance. Patient denies suicidal ideation, plan, intent.

## 2018-11-28 PROCEDURE — 25000132 ZZH RX MED GY IP 250 OP 250 PS 637: Performed by: PHYSICIAN ASSISTANT

## 2018-11-28 PROCEDURE — 25000132 ZZH RX MED GY IP 250 OP 250 PS 637: Performed by: PSYCHIATRY & NEUROLOGY

## 2018-11-28 PROCEDURE — 12400007 ZZH R&B MH INTERMEDIATE UMMC

## 2018-11-28 PROCEDURE — 25000132 ZZH RX MED GY IP 250 OP 250 PS 637: Performed by: EMERGENCY MEDICINE

## 2018-11-28 RX ADMIN — PAROXETINE 40 MG: 20 TABLET, FILM COATED ORAL at 13:40

## 2018-11-28 RX ADMIN — BUPROPION HYDROCHLORIDE 75 MG: 75 TABLET, FILM COATED ORAL at 13:39

## 2018-11-28 RX ADMIN — CLONAZEPAM 0.5 MG: 0.5 TABLET ORAL at 13:39

## 2018-11-28 RX ADMIN — Medication 500 MG: at 13:39

## 2018-11-28 RX ADMIN — LEVOTHYROXINE SODIUM 25 MCG: 25 TABLET ORAL at 08:57

## 2018-11-28 ASSESSMENT — ACTIVITIES OF DAILY LIVING (ADL)
DRESS: STREET CLOTHES;INDEPENDENT
DRESS: STREET CLOTHES
ORAL_HYGIENE: INDEPENDENT
GROOMING: INDEPENDENT
GROOMING: INDEPENDENT
ORAL_HYGIENE: INDEPENDENT

## 2018-11-28 NOTE — PROGRESS NOTES
"Pt has been isolative to her room the entire shift.  Pt reports that she is not feeling well.  Pt reports that her depression level is 10/10 and her anxiety level is 8/10 with 10 the most.  Pt refused both breakfast and lunch but did drink her lunch boost after much encouragement.  Pt was tearful, stating \"I just want to get better\".  Pt is hoping that her medication change will \"help me be up more\".  Pt was encouraged to take a shower but reported she \"will when I am feeling better\".     11/28/18 1342   Behavioral Health   Hallucinations denies / not responding to hallucinations   Thinking distractable;poor concentration  (ruminating thoughts)   Orientation person: oriented;place: oriented;date: oriented   Insight poor   Judgement impaired   Eye Contact at examiner   Affect blunted, flat;sad   Mood depressed;anxious   Physical Appearance/Attire attire appropriate to age and situation   Hygiene neglected grooming - unclean body, hair, teeth;body odor   Suicidality (denies SI)   1. Wish to be Dead No   2. Non-Specific Active Suicidal Thoughts  No   Self Injury (denies SIB thinking)   Elopement (none observed)   Activity isolative;withdrawn;refusal   Speech clear;coherent   Psychomotor / Gait balanced;steady   Activities of Daily Living   Hygiene/Grooming independent   Oral Hygiene independent   Dress street clothes   Room Organization independent     "

## 2018-11-28 NOTE — PROGRESS NOTES
Pt spent entirety of evening shift in her room sleeping. Writer attempted to wake pt for dinner around 18:00. Pt stated that she did not want to eat. Writer encouraged pt to get up and try to eat but pt refused.      11/27/18 2100   Behavioral Health   Hallucinations denies / not responding to hallucinations   Orientation place: oriented;person: oriented;date: oriented;time: oriented   Memory baseline memory   Insight poor   Judgement impaired   Affect blunted, flat   Mood mood is calm   Physical Appearance/Attire disheveled   Hygiene neglected grooming - unclean body, hair, teeth   Suicidality (SARI)   1. Wish to be Dead (SARI)   2. Non-Specific Active Suicidal Thoughts  (SARI)   Activity isolative;withdrawn  (sleeping)   Speech clear;coherent   Activities of Daily Living   Hygiene/Grooming independent   Oral Hygiene independent   Dress independent   Laundry with supervision   Room Organization independent

## 2018-11-29 LAB
ANION GAP SERPL CALCULATED.3IONS-SCNC: 5 MMOL/L (ref 3–14)
BUN SERPL-MCNC: 20 MG/DL (ref 7–30)
CALCIUM SERPL-MCNC: 9.6 MG/DL (ref 8.5–10.1)
CHLORIDE SERPL-SCNC: 102 MMOL/L (ref 94–109)
CO2 SERPL-SCNC: 30 MMOL/L (ref 20–32)
CREAT SERPL-MCNC: 0.74 MG/DL (ref 0.52–1.04)
GFR SERPL CREATININE-BSD FRML MDRD: 81 ML/MIN/1.7M2
GLUCOSE SERPL-MCNC: 90 MG/DL (ref 70–99)
MAGNESIUM SERPL-MCNC: 2.2 MG/DL (ref 1.6–2.3)
PHOSPHATE SERPL-MCNC: 3.4 MG/DL (ref 2.5–4.5)
POTASSIUM SERPL-SCNC: 4.4 MMOL/L (ref 3.4–5.3)
SODIUM SERPL-SCNC: 137 MMOL/L (ref 133–144)

## 2018-11-29 PROCEDURE — 99207 ZZC APP CREDIT; MD BILLING SHARED VISIT: CPT | Performed by: PSYCHIATRY & NEUROLOGY

## 2018-11-29 PROCEDURE — 25000132 ZZH RX MED GY IP 250 OP 250 PS 637: Performed by: PHYSICIAN ASSISTANT

## 2018-11-29 PROCEDURE — 93010 ELECTROCARDIOGRAM REPORT: CPT | Performed by: INTERNAL MEDICINE

## 2018-11-29 PROCEDURE — 12400007 ZZH R&B MH INTERMEDIATE UMMC

## 2018-11-29 PROCEDURE — 83735 ASSAY OF MAGNESIUM: CPT | Performed by: NURSE PRACTITIONER

## 2018-11-29 PROCEDURE — 80048 BASIC METABOLIC PNL TOTAL CA: CPT | Performed by: NURSE PRACTITIONER

## 2018-11-29 PROCEDURE — 36415 COLL VENOUS BLD VENIPUNCTURE: CPT | Performed by: NURSE PRACTITIONER

## 2018-11-29 PROCEDURE — 25000132 ZZH RX MED GY IP 250 OP 250 PS 637: Performed by: EMERGENCY MEDICINE

## 2018-11-29 PROCEDURE — 93005 ELECTROCARDIOGRAM TRACING: CPT

## 2018-11-29 PROCEDURE — 25000132 ZZH RX MED GY IP 250 OP 250 PS 637: Performed by: PSYCHIATRY & NEUROLOGY

## 2018-11-29 PROCEDURE — 84100 ASSAY OF PHOSPHORUS: CPT | Performed by: NURSE PRACTITIONER

## 2018-11-29 RX ORDER — BUPROPION HYDROCHLORIDE 100 MG/1
100 TABLET ORAL DAILY
Status: DISCONTINUED | OUTPATIENT
Start: 2018-11-29 | End: 2019-01-07

## 2018-11-29 RX ORDER — BUPROPION HYDROCHLORIDE 100 MG/1
100 TABLET ORAL DAILY
Status: DISCONTINUED | OUTPATIENT
Start: 2018-11-30 | End: 2018-11-29

## 2018-11-29 RX ADMIN — PAROXETINE 40 MG: 20 TABLET, FILM COATED ORAL at 13:22

## 2018-11-29 RX ADMIN — LEVOTHYROXINE SODIUM 25 MCG: 25 TABLET ORAL at 09:40

## 2018-11-29 RX ADMIN — CLONAZEPAM 0.5 MG: 0.5 TABLET ORAL at 13:22

## 2018-11-29 RX ADMIN — BUPROPION HYDROCHLORIDE 100 MG: 100 TABLET, FILM COATED ORAL at 13:22

## 2018-11-29 RX ADMIN — Medication 500 MG: at 14:37

## 2018-11-29 ASSESSMENT — ACTIVITIES OF DAILY LIVING (ADL)
DRESS: INDEPENDENT
DRESS: STREET CLOTHES;INDEPENDENT
ORAL_HYGIENE: INDEPENDENT
LAUNDRY: WITH SUPERVISION
ORAL_HYGIENE: INDEPENDENT
GROOMING: INDEPENDENT
GROOMING: INDEPENDENT
LAUNDRY: WITH SUPERVISION

## 2018-11-29 NOTE — CONSULTS
"  [facility]  Internal Medicine Consult    Gwen Cash MRN# [id number]   Age: 53 year old YOB: 1965     Date of Admission: 2018  Date of Consult:  2018    Requesting Service: Behavioral Health - Wei Powell MD  Reason for Consult: Pre ECT Medicine Evaluation   Indication for ECT: Severe depression, anxiety, OCD     Chief Complaint:  Severe depression, anxiety, OCD   HPI:   Gwen Cash is a 53 year old female with past medical history of hypothyroidism, depression, anxiety, and OCD admitted station 10 for worsening depression, worsening OCD symptoms, and lack of oral intake.     Review of Systems:   Cardiovascular: negative  Pulmonary: No shortness of breath, dyspnea on exertion, cough, or hemoptysis  Neurological: vague feeling of \"off balance\" with starting psych meds, but no falls - otherwise negative    Past Medical History:   Prior Anesthesia: Yes  If yes, any complications: No    Prior ECT: No    Cardiovascular: CAD No, MI No, HTN No, CHF No, pacemaker or ICD No  Pulmonary: Asthma/COPD No, Prior respiratory failure or need for emergent intubation No, On theophylline No (note that theophylline use is a contraindication to proceeding with ECT, needs to be tapered off prior)  Neurological: Brain tumor No, TIA/CVA No, Dementia No, Neuromuscular Disease (including post polio syndrome) No, Seizures and/or Epilepsy No, Head Injury No, Intracranial Hemorrhage No    Past Surgical History:   Past Surgical History:   Procedure Laterality Date      SECTION      x 3     LITHOTRIPSY         Allergies:      Allergies   Allergen Reactions     Metronidazole Unknown     Abstracted data , patient cannot remember      Penicillins Unknown     Abstracted data , patient cannot remember        Medication list reviewed.    Physical Exam:  /67  Pulse 81  Temp 98.9  F (37.2  C) (Oral)  Resp 16  Ht 1.575 m (5' 2\")  Wt 44.1 kg (97 lb 4.8 oz)  SpO2 98%  " BMI 17.8 kg/m2   General: AAOx3.  Thin body habitus, temporal wasting, looks undernourished.  Tearful and anxious.   Cardiovascular: RRR. S1, S2. No murmurs, rubs, gallops.   Pulmonary: Effort normal. Lungs CTAB with no wheezing, rales, rhonchi.   Neurological: A&Ox3. No focal deficits. PERRLA. EOMs intact.     Data:  EKG: Sinus rhythm, possible L atrial enlargement, HR 85     Head Imaging: none recent, last MRI brain in 2004 was negative for any acute pathology.     Labs were obtained within the last 30 days on  and are as follows:   CBC:  Recent Labs   Lab Test  11/16/18   0356   WBC  7.8   RBC  5.19   HGB  15.9*   HCT  46.3   MCV  89   MCH  30.6   MCHC  34.3   RDW  12.6   PLT  338     CMP:  Recent Labs   Lab Test  11/19/18   0807   11/16/18   0356   NA  141   < >  141   POTASSIUM  4.0   < >  3.8   CHLORIDE  106   < >  103   JOSE  8.9   < >  9.5   CO2  30   < >  29   BUN  14   < >  6*   CR  0.74   < >  0.72   GLC  83   < >  103*   AST   --    --   11   ALT   --    --   17   BILITOTAL   --    --   0.4   ALBUMIN   --    --   4.9   PROTTOTAL   --    --   8.3   ALKPHOS   --    --   74    < > = values in this interval not displayed.     HCG: Negative    Recommendations:   Diuretics and oral hypoglycemics should be held the morning of ECT.   All other antihypertensive medications can be continued.     Patient does not have absolute medical contraindications to proceeding with ECT at this time. Treatment plan per psychiatry.       Meredith Lemus, [facility]  Hospitalist Service   Pager: [phone]

## 2018-11-29 NOTE — PROGRESS NOTES
"Brief Internal Medicine Note, 11/29/18:    IM consulted for ECT clearance and concerns for malignancy in setting of subjective weight loss ~ 30lbs.      Vidya is resting in bed.  She declined my visit and exam at this time for ECT clearance, but wanted to share concerns about her weight loss.  She tells me that she has lost about 30lbs since January 2018 (though unable to verify this in charting).  She reports eating \"about one meal per day\" at present.  She denies fevers, chills, night sweats, hemoptysis, dyspnea/PALMA, chest pain, abdominal pain, hematemesis, hematochezia, hematuria, and melena.  No complaints of focal symptoms of pain.  She asked me to come back and see her tomorrow.     D/w Dr. Powell, will not pursue further workup for occult malignancy at this time given negative ROS as above.  Recommend that patient establish care with PCP and follow up for routine cancer screenings (due for colonoscopy, mammaogram).        Meredith Lemus, [facility]  Hospitalist Service   Pager: [phone]  "

## 2018-11-29 NOTE — PROGRESS NOTES
"Pt was in her room sleeping for the entire shift. Upon check in pt stated \"I'm not really feeling well, I don't want to check in\". Writer asked about safety, pt denied SI and SIB. Pt endorsed feeling depressed, did not give a rating. Pt did not eat meals, writer and other staff encouraged her to try to eat, and to drink water, but pt did not do either. Pt stated \"I'll eat tomorrow\". Pt continues to neglect hygiene, she still has not showered. Pt appeared very depressed today, she declined phone calls from family/friends.        11/28/18 9417   Behavioral Health   Hallucinations denies / not responding to hallucinations   Thinking distractable;poor concentration   Orientation date, disoriented;person: oriented;place: oriented   Insight poor   Judgement impaired   Eye Contact at examiner   Affect blunted, flat   Mood depressed   Physical Appearance/Attire attire appropriate to age and situation   Hygiene neglected grooming - unclean body, hair, teeth   Suicidality (Denies)   1. Wish to be Dead No   2. Non-Specific Active Suicidal Thoughts  No   Self Injury (Denies, none observed)   Elopement (No statements made, none observed)   Activity isolative;withdrawn;other (see comment)  (Sleeping/napping)   Speech clear;coherent   Psychomotor / Gait balanced;steady   Activities of Daily Living   Hygiene/Grooming independent   Oral Hygiene independent   Dress street clothes;independent   Room Organization independent     "

## 2018-11-29 NOTE — PLAN OF CARE
Problem: Weight/Appetite Change (Depressive Signs/Symptoms) (Adult)  Goal: Nutrition/Weight Optimized (Depressive Signs/Symptoms)  Outcome: Declining  Patient has spent the entire shift lying in bed in her room. Patient declined breakfast and lunch. Did agree to drink boost with much encouragement. Does drink water independently. Patient states she doesn't feel well and became dizzy and had to lean against the wall when she stood up. Vital signs are within normal limits. Patient is medication compliant but continues to question each pill, even those that are not newly prescribed.    Provider ordered an EKG for patient and she stated she would not be ready to do it today. When EKG tech arrived to the unit, writer went in to patient's room and encouraged her to complete the procedure. She was visibly anxious and tearful during the entire procedure, but she was able to complete it. Internal medicine provider notified of abnormal EKG results.

## 2018-11-29 NOTE — PROGRESS NOTES
"[facility], [place]   Psychiatric Progress Note        Interim History:   Reason for Hospitalization:Gwen is a 53 year old female with a history of depression, anxiety, OCD who is admitted on a voluntary basis for worsening depressive moods, lack of oral intake, and worsened obsessive/compulsive symptoms.     Subjective: \" I want to get better, and want to be on the right medications\"     As per today's interview: The patient was seen in her room, tearful today. Mostly isolative today. She denied any SI/intent or plan. She is frustrated for continuing to have OCD symptoms and depressive symptoms. Willing to try new medications, but worried/fearful of side effects.          Medications:       acetylcysteine  500 mg Oral Daily     buPROPion  100 mg Oral Daily     clonazePAM  0.5 mg Oral Daily     influenza vaccine adult (product based on age)  0.5 mL Intramuscular Prior to discharge     levothyroxine  25 mcg Oral QAM AC     PARoxetine  40 mg Oral Daily with lunch          Allergies:     Allergies   Allergen Reactions     Metronidazole Unknown     Abstracted data , patient cannot remember      Penicillins Unknown     Abstracted data , patient cannot remember           Labs:     No results found for this or any previous visit (from the past 48 hour(s)).       Psychiatric Examination:   /72  Pulse 79  Temp 97.8  F (36.6  C) (Tympanic)  Resp 18  Ht 1.575 m (5' 2\")  Wt 44.1 kg (97 lb 4.8 oz)  SpO2 97%  BMI 17.8 kg/m2  Weight is 97 lbs 4.8 oz  Body mass index is 17.8 kg/(m^2).    Appearance:  female. Thin, malnourished appearing. Pale skin. Appears fatigued. Unkept/disheveled appearance. No acute distress.   Attitude:  Attentive, cooperative, however anxious and tends to ask the same questions repeatedly.   Eye Contact:  fair  Mood:  anxious   Affect:  Improved range today.   Speech:  clear, coherent  Language: fluent and intact in English  Psychomotor, Gait, " Musculoskeletal:  no evidence of tardive dyskinesia, dystonia, or tics  Throught Process:  logical  Associations:  no loose associations  Thought Content:  no evidence of suicidal ideation or homicidal ideation, no evidence of psychotic thought, no auditory hallucinations present and no visual hallucinations present  Insight:  partial  Judgement:  Fair-Poor   Oriented to:  time, person, and place  Attention Span and Concentration:  fair/limited   Recent and Remote Memory:  fair  Fund of Knowledge:  appropriate      Assessment & Plan       Principal Diagnosis:   Major Depressive Disorder, recurrent, severe  OCD  Illness Anxiety Disorder  Obsessive Compulsive Personality Disorder   Subclinical Hypothyroidism     Assessment:   (Initial Assessment): Patient appears to be experiencing worsening neurovegetative symptoms of depression, particularly apathy, avolition/amotivation, anergy, along with a serious weight loss within a short amount of time (4-5 months). Her weight loss appears to be related to her depression, but malignant etiology should be ruled out at some point. She also has worsened compulsions/obsessions characteristic of OCD. We discussed the need to initiate medications to help augment her mood, sleep and weight gain. Discussed starting Risperdal to help with OCD and depression augmentation, and Remeron for additional sleep/appetite improvement. Will work with medicine PA to assess for refeeding issues as time goes on, along with consult with nutritionist for recommendations on improved caloric intake.     Update 11/19: Improved oral intake (claims to be eating lunch and dinner most days, and drinking boost during meals). Had a long discussion about medications, during which she has many questions and kept on ruminating over the same issue over and over. Spoke to pt's daughter about plan. Pt's Mg and electrolytes appear to have normalized. Pt refusing Risperdal. Discussed starting Wellbutrin for her  amotivational, apathetic symptoms, along with the added benefit of smoking cessation. Discussed ECT, but patient refused at this time.     ------    Update 11/27: After an initial period of some improved oral intake and some increased behavior drive, she seems to have worsened. In there interim, Wellbutrin was discontinued, Paxil was increased. She has been mostly isolative in her room, tearful, ruminative about various worries, and decreased oral intake. I discussed adding back on a small dose of Wellbutrin since it seemed to improve her energy and volitional drive (as per patient). I will also add on N-acetylcysteine capsules to provide adjuctive treatment for her OCD. I continued to discuss ECT for her treatment resistant depression and OCD.     Update 11/29: Patient has been eating and drink minimally. Continues to be fearful for medications, and obsession about the dose/side effects, etc. Discussed the benefit of ECT, which she was half heartedly willing to consider for now, thus I will order the ECT consult to get the plan started. Collateral info from sister revealed that patient is chronically lower BMI throughout her life, and her weight loss was more in the range of 10-15 lbs instead of 30 lbs.      Plan:  1.) Medication Plan:  - Wellbutrin 100 mg today - for neurovegetative depressive symptoms   -  N-acetylcysteine 500 mg capsules - for adjunctive treatment for OCD    - Remeron 7.5 mg HS  - Paxil 40 mg - can be increased by 10 mg a week up to 60 mg .   - Klonopin 0.5 mg Daily  - ECT consult/Medicing Consult/EKG - for treatment resistant depression and OCD.      2.) Psychosocial Plan:  - Work with [facility] to help arrange appropriate follow up care  - Nutritionist Consult (recommend they follow up)      Legal:  Voluntary (but hold-able)      Disposition:  Unclear, pending stabilization - possibly in 1 week or so.       Safety Assessment:   Checks: Status 15  Precautions: None  Pt has not required locked  seclusion or restraints in the past 24 hours to maintain safety, please refer to RN documentation for further details.       The risks, benefits, alternatives and side effects have been discussed and are understood by the patient and other caregivers.         Attestation:  Patient has been seen and evaluated by me,  Wei Powell MD

## 2018-11-30 LAB — INTERPRETATION ECG - MUSE: NORMAL

## 2018-11-30 PROCEDURE — 12400007 ZZH R&B MH INTERMEDIATE UMMC

## 2018-11-30 PROCEDURE — 25000132 ZZH RX MED GY IP 250 OP 250 PS 637: Performed by: EMERGENCY MEDICINE

## 2018-11-30 PROCEDURE — 25000132 ZZH RX MED GY IP 250 OP 250 PS 637: Performed by: PHYSICIAN ASSISTANT

## 2018-11-30 PROCEDURE — 99207 ZZC CDG-MDM COMPONENT: MEETS MODERATE - UP CODED: CPT | Performed by: PSYCHIATRY & NEUROLOGY

## 2018-11-30 PROCEDURE — 99232 SBSQ HOSP IP/OBS MODERATE 35: CPT | Performed by: PSYCHIATRY & NEUROLOGY

## 2018-11-30 PROCEDURE — 25000132 ZZH RX MED GY IP 250 OP 250 PS 637: Performed by: NURSE PRACTITIONER

## 2018-11-30 PROCEDURE — 25000132 ZZH RX MED GY IP 250 OP 250 PS 637: Performed by: PSYCHIATRY & NEUROLOGY

## 2018-11-30 RX ORDER — TRIAMCINOLONE ACETONIDE 1 MG/G
CREAM TOPICAL 2 TIMES DAILY
Status: DISCONTINUED | OUTPATIENT
Start: 2018-11-30 | End: 2018-11-30

## 2018-11-30 RX ORDER — TRIAMCINOLONE ACETONIDE 1 MG/G
CREAM TOPICAL 2 TIMES DAILY
Status: DISCONTINUED | OUTPATIENT
Start: 2018-11-30 | End: 2018-12-04

## 2018-11-30 RX ADMIN — CLONAZEPAM 0.5 MG: 0.5 TABLET ORAL at 14:02

## 2018-11-30 RX ADMIN — Medication 500 MG: at 14:01

## 2018-11-30 RX ADMIN — TRIAMCINOLONE ACETONIDE: 1 CREAM TOPICAL at 17:20

## 2018-11-30 RX ADMIN — PAROXETINE 40 MG: 20 TABLET, FILM COATED ORAL at 14:02

## 2018-11-30 RX ADMIN — BUPROPION HYDROCHLORIDE 100 MG: 100 TABLET, FILM COATED ORAL at 12:13

## 2018-11-30 RX ADMIN — LEVOTHYROXINE SODIUM 25 MCG: 25 TABLET ORAL at 09:14

## 2018-11-30 ASSESSMENT — ACTIVITIES OF DAILY LIVING (ADL)
GROOMING: PROMPTS
DRESS: STREET CLOTHES
ORAL_HYGIENE: PROMPTS
GROOMING: PROMPTS
ORAL_HYGIENE: PROMPTS
DRESS: STREET CLOTHES
LAUNDRY: WITH SUPERVISION

## 2018-11-30 NOTE — PLAN OF CARE
"Problem: Mood Impairment (Depressive Signs/Symptoms) (Adult)  Goal: Improved Mood Symptoms (Depressive Signs/Symptoms)  Outcome: No Change  \"I don't feel too much better than when I came here.\"  Talked about losing appetite and not eating much the past 2 days.  \"It might be because I saw that I lost 4 oz and I didn't know that was hardly anything.  I was so upset.\"  Feels depression level is unchanged this week, rating this at a 7-8 on a scale with 10 being the worst.  Feels anxiety \"is not too bad right now\" rating this at a 5-6 on the same scale.  Denies suicidal ideation, wish to be dead or self harm thoughts.  States continues to have difficulty with focus and concentration \"due to the depression\".  Indicates is sleeping \"OK\" at night, but feels so tired in the mornings.  Admits this is most likely due to depression and was staying in bed all day at home.  Was encouraged to push self to get out of bed and spend time out of room.  Agreed would eat lunch today and stay out of room this afternoon.  Declined breakfast this morning.  When asked about ECT indicates \"I'm not to keen on the idea.  I don't know anything about it and I would have to know a lot more.  They tried to get me to do it once before and I didn't agree.\"  States sister doesn't want her to have it and her daughter doesn't know enough about it.  Reluctantly came to medication window for morning medication and allowed this author to talk with her briefly.  Wanted to do these things later after resting.      "

## 2018-11-30 NOTE — PROGRESS NOTES
"[facility], [place]   Psychiatric Progress Note        Interim History:   Reason for Hospitalization:Gwen is a 53 year old female with a history of depression, anxiety, OCD who is admitted on a voluntary basis for worsening depressive moods, lack of oral intake, and worsened obsessive/compulsive symptoms.     Subjective: \" I want to get better, and want to be on the right medications\"     As per today's interview: The patient was seen in her room, less tearful compared to prior days, however continuously ruminative and obsessive on various things. Today, her obsessive thoughts were centered on various side effects of ECT. I took time to explain to her the ins and outs of ECT treatment along with possible side effects. She mentioned she had some oral intake today.          Medications:       acetylcysteine  500 mg Oral Daily     buPROPion  100 mg Oral Daily     clonazePAM  0.5 mg Oral Daily     influenza vaccine adult (product based on age)  0.5 mL Intramuscular Prior to discharge     levothyroxine  25 mcg Oral QAM AC     PARoxetine  40 mg Oral Daily with lunch     triamcinolone   Topical BID          Allergies:     Allergies   Allergen Reactions     Metronidazole Unknown     Abstracted data , patient cannot remember      Penicillins Unknown     Abstracted data , patient cannot remember           Labs:     Recent Results (from the past 48 hour(s))   Basic metabolic panel    Collection Time: 11/29/18  1:44 PM   Result Value Ref Range    Sodium 137 133 - 144 mmol/L    Potassium 4.4 3.4 - 5.3 mmol/L    Chloride 102 94 - 109 mmol/L    Carbon Dioxide 30 20 - 32 mmol/L    Anion Gap 5 3 - 14 mmol/L    Glucose 90 70 - 99 mg/dL    Urea Nitrogen 20 7 - 30 mg/dL    Creatinine 0.74 0.52 - 1.04 mg/dL    GFR Estimate 81 >60 mL/min/1.7m2    GFR Estimate If Black >90 >60 mL/min/1.7m2    Calcium 9.6 8.5 - 10.1 mg/dL   Magnesium    Collection Time: 11/29/18  1:44 PM   Result Value Ref Range    Magnesium " "2.2 1.6 - 2.3 mg/dL   Phosphorus    Collection Time: 11/29/18  1:44 PM   Result Value Ref Range    Phosphorus 3.4 2.5 - 4.5 mg/dL   EKG 12-lead, complete    Collection Time: 11/29/18  2:31 PM   Result Value Ref Range    Interpretation ECG Click View Image link to view waveform and result           Psychiatric Examination:   /75  Pulse 83  Temp 98.9  F (37.2  C) (Oral)  Resp 16  Ht 1.575 m (5' 2\")  Wt 44.1 kg (97 lb 4.8 oz)  SpO2 97%  BMI 17.8 kg/m2  Weight is 97 lbs 4.8 oz  Body mass index is 17.8 kg/(m^2).    Appearance:  female. Thin, malnourished appearing. Pale skin. Appears fatigued. Unkept/disheveled appearance. Malodorous. No acute distress.   Attitude:  Attentive, cooperative, however anxious and tends to ask the same questions repeatedly.   Eye Contact:  fair  Mood:  anxious   Affect:  Improved range today.   Speech:  clear, coherent  Language: fluent and intact in English  Psychomotor, Gait, Musculoskeletal:  no evidence of tardive dyskinesia, dystonia, or tics  Throught Process:  logical  Associations:  no loose associations  Thought Content:  no evidence of suicidal ideation or homicidal ideation, no evidence of psychotic thought, no auditory hallucinations present and no visual hallucinations present  Insight:  partial  Judgement:  Fair-Poor   Oriented to:  time, person, and place  Attention Span and Concentration:  fair/limited   Recent and Remote Memory:  fair  Fund of Knowledge:  appropriate      Assessment & Plan       Principal Diagnosis:   Major Depressive Disorder, recurrent, severe  OCD  Illness Anxiety Disorder  Obsessive Compulsive Personality Disorder   Subclinical Hypothyroidism     Assessment:   (Initial Assessment): Patient appears to be experiencing worsening neurovegetative symptoms of depression, particularly apathy, avolition/amotivation, anergy, along with a serious weight loss within a short amount of time (4-5 months). Her weight loss appears to be related to " her depression, but malignant etiology should be ruled out at some point. She also has worsened compulsions/obsessions characteristic of OCD. We discussed the need to initiate medications to help augment her mood, sleep and weight gain. Discussed starting Risperdal to help with OCD and depression augmentation, and Remeron for additional sleep/appetite improvement. Will work with medicine PA to assess for refeeding issues as time goes on, along with consult with nutritionist for recommendations on improved caloric intake.     Update 11/19: Improved oral intake (claims to be eating lunch and dinner most days, and drinking boost during meals). Had a long discussion about medications, during which she has many questions and kept on ruminating over the same issue over and over. Spoke to pt's daughter about plan. Pt's Mg and electrolytes appear to have normalized. Pt refusing Risperdal. Discussed starting Wellbutrin for her amotivational, apathetic symptoms, along with the added benefit of smoking cessation. Discussed ECT, but patient refused at this time.     ------    Update 11/27: After an initial period of some improved oral intake and some increased behavior drive, she seems to have worsened. In there interim, Wellbutrin was discontinued, Paxil was increased. She has been mostly isolative in her room, tearful, ruminative about various worries, and decreased oral intake. I discussed adding back on a small dose of Wellbutrin since it seemed to improve her energy and volitional drive (as per patient). I will also add on N-acetylcysteine capsules to provide adjuctive treatment for her OCD. I continued to discuss ECT for her treatment resistant depression and OCD.     Update 11/29: Patient has been eating and drink minimally. Continues to be fearful for medications, and obsession about the dose/side effects, etc. Discussed the benefit of ECT, which she was half heartedly willing to consider for now, thus I will order the  ECT consult to get the plan started. Collateral info from sister revealed that patient is chronically lower BMI throughout her life, and her weight loss was more in the range of 10-15 lbs instead of 30 lbs.     Update 11/30: Some minimal food intake today. Contemplative about ECT, and rather obsessive about any and all associated side effects, including her regular obsession about her medications. Will continue to go forth with ECT plan. Recommended her to watch video with family, whom I primed (from my conversation yesterday with them) to encourage her to go through with it.      Plan:  1.) Medication Plan:  - Wellbutrin 100 mg today - for neurovegetative depressive symptoms   -  N-acetylcysteine 500 mg capsules - for adjunctive treatment for OCD    - Remeron 7.5 mg HS  - Paxil 40 mg - can be increased by 10 mg a week up to 60 mg .   - Klonopin 0.5 mg Daily  - ECT consult/Medicing Consult/EKG - for treatment resistant depression and OCD.      2.) Psychosocial Plan:  - Work with [facility] to help arrange appropriate follow up care  - Nutritionist Consult (recommend they follow up)      Legal:  Voluntary (but hold-able)      Disposition:  Unclear, pending stabilization - possibly in 1 week or so.       Safety Assessment:   Checks: Status 15  Precautions: None  Pt has not required locked seclusion or restraints in the past 24 hours to maintain safety, please refer to RN documentation for further details.       The risks, benefits, alternatives and side effects have been discussed and are understood by the patient and other caregivers.         Attestation:  Patient has been seen and evaluated by me,  Wei Powell MD

## 2018-11-30 NOTE — PROGRESS NOTES
CLINICAL NUTRITION SERVICES - REASSESSMENT NOTE     Nutrition Prescription    RECOMMENDATIONS FOR MDs/PROVIDERS TO ORDER:  Recommend discuss trialing an appetite stimulant with patient given frequent minimal appetite.    Malnutrition Status:    Non-severe malnutrition in the context of environmental or social circumstances    Recommendations already ordered by Registered Dietitian (RD):  Continue Boost Plus TID with meals (changed flavors to moose and strawberry)  Magic cup (moose)  Gelatein (cherry) with dinner    Future/Additional Recommendations:  Monitor po intakes and wt trends. Consider need to add/discontinue supplement or adjust flavors pending trends and pt preferences.       EVALUATION OF THE PROGRESS TOWARD GOALS   Diet: Regular  Supplement: Boost Plus TID with meals (variety)  Intake: Per RN note today, pt reported reduced appetite and not eating much the past 2 days. Declined breakfast this am. Noted to eat 60% of dinner yesterday, but had declined breakfast and lunch. Did agree to drink Boost.  Per discussion with pt, her appetite has been variable, but is very minimal at times. Reports not eating much the past two days, but was previously eating all of lunch and dinner + 2 Boost/day. Pt reports sleeping through breakfast daily and therefore does not anything before lunch - she said that she is not willing to get up earlier for breakfast but is open to drinking the third Boost later in the day.      NEW FINDINGS   - Wt: Last wt reading 11/25 - pt was up 2 lb since admit. Remains in underweight BMI category. Per previous RD assessment, pt reported  lb. She states that in Jan/Feb, she went to the doctor and was 119 lbs. This is when she realized she was losing weight. She feels weight loss has been more rapid in the past few months. No recent wt history available in CareEverywhere, making it difficult to quantify wt loss. Per pt's report, wt loss of at least 22 lb (18%) over the past ~11 months.  -  Labs: Reviewed  - Meds: Reviewed    MALNUTRITION  % Intake: < 75% for >/= 3 months (non-severe) overall trend  % Weight Loss: Up to 20% in 1 year (non-severe)  Subcutaneous Fat Loss: Facial region: Mild-moderate  Muscle Loss: Temporal, Facial & jaw region and Thoracic region (clavicle, acromium bone, deltoid, trapezius, pectoral): Moderate  Fluid Accumulation/Edema: None noted  Malnutrition Diagnosis: Non-severe malnutrition in the context of environmental or social circumstances    Previous Goals   Patient to consume % of nutritionally adequate meal trays TID, or the equivalent with supplements/snacks.  Evaluation: Met    Previous Nutrition Diagnosis  Predicted inadequate nutrient intake (protein-energy) related to variable appetite as evidenced by reliance on po intakes to meet estimated needs with potential for decline.     Evaluation: Resolved    CURRENT NUTRITION DIAGNOSIS  Inadequate oral intake related to suboptimal appetite 2/2 depressed mood as evidenced by pt skipping many meals x2 days, overall reduced intakes over the past several months, and wt loss over the past ~11 months.      INTERVENTIONS  Implementation  Medical food supplement therapy - Boost Plus - modified to moose and strawberry per pt's preferences. Magic Cup (moose) with lunch. Gelatein (cherry) with dinner.  Encouraged pt to eat breakfast, though she says that she is not willing to get up for it. Therefore, encouraged pt to drink 3 Boost/day. Will also try other supplements above to help increase intakes. Brought up pt to discuss appetite stimulant with provider - pt was not very open to idea at this time, but encouraged pt to discuss it further.    Goals  1. Patient to consume % of nutritionally adequate meal trays TID, or the equivalent with supplements/snacks.    2. BMI to progress towards Normal BMI status (>18.5 kg/m2).    Monitoring/Evaluation  Progress toward goals will be monitored and evaluated per protocol.    Stephanie  IRWIN Trejo, LD  Unit pgr: [phone]

## 2018-11-30 NOTE — PROGRESS NOTES
Pt was in her room for the majority of the shift, she was isolative and withdrawn and spent most of her time sleeping or tidying her room. Pt ate about 60% of her meal, she did not shower. Pt's overall affect was sad, her mood was depressed and anxious. At times she was tearful. Pt endorsed depression and anxiety, she rated depression 10/10 and anxiety 8-9/10. Pt denied SI, SIB, and hallucinations. Pt expressed concerns over treatment plan, she is worried about starting ECT. Pt had one phone call, which she did take. One visitor came (her daughter) and visit went well. Overall pt had an ok shift.        11/29/18 2040   Behavioral Health   Hallucinations denies / not responding to hallucinations   Thinking distractable;poor concentration   Orientation person: oriented;place: oriented   Insight poor   Judgement impaired   Eye Contact at examiner   Affect sad   Mood depressed;anxious   Physical Appearance/Attire attire appropriate to age and situation   Hygiene neglected grooming - unclean body, hair, teeth;body odor   Suicidality other (see comments)  (Denies)   1. Wish to be Dead No   2. Non-Specific Active Suicidal Thoughts  No   Self Injury (Denies, none observed)   Elopement (None observed, no statements made)   Activity isolative;withdrawn   Speech clear;coherent   Psychomotor / Gait balanced;steady   Activities of Daily Living   Hygiene/Grooming independent   Oral Hygiene independent   Dress street clothes;independent   Laundry with supervision   Room Organization independent

## 2018-11-30 NOTE — CONSULTS
ECT 2ND OPINION CONSULTATION    Patient was seen today.  Note will be dictated.    Assessment:   She's quite depressed and has severe OCD.  She has been in the hospital 2 weeks and remains severely depressed.       Plan:  Pt is indeed a candidate for ECT.  She obsesses, and doesn't know if she wants ECT.  She is a candidate for ECT.  She will watch the ECT video with her family and think about it.  If she agrees to ECT, we could start Monday, 12/3/18.  An ECT order would need to be placed.  She'd need to be medically cleared for ECT.      Melo Wang MD

## 2018-12-01 PROCEDURE — 12400007 ZZH R&B MH INTERMEDIATE UMMC

## 2018-12-01 PROCEDURE — 25000132 ZZH RX MED GY IP 250 OP 250 PS 637: Performed by: EMERGENCY MEDICINE

## 2018-12-01 PROCEDURE — 25000132 ZZH RX MED GY IP 250 OP 250 PS 637: Performed by: PSYCHIATRY & NEUROLOGY

## 2018-12-01 PROCEDURE — 25000132 ZZH RX MED GY IP 250 OP 250 PS 637: Performed by: PHYSICIAN ASSISTANT

## 2018-12-01 RX ORDER — PAROXETINE HCL 10 MG
40 TABLET ORAL
Status: DISCONTINUED | OUTPATIENT
Start: 2018-12-01 | End: 2018-12-26 | Stop reason: CLARIF

## 2018-12-01 RX ADMIN — LEVOTHYROXINE SODIUM 25 MCG: 25 TABLET ORAL at 09:30

## 2018-12-01 RX ADMIN — BUPROPION HYDROCHLORIDE 100 MG: 100 TABLET, FILM COATED ORAL at 14:37

## 2018-12-01 RX ADMIN — PAROXETINE 40 MG: 20 TABLET, FILM COATED ORAL at 14:37

## 2018-12-01 RX ADMIN — Medication 1 MG: at 23:51

## 2018-12-01 RX ADMIN — CLONAZEPAM 0.5 MG: 0.5 TABLET ORAL at 14:37

## 2018-12-01 RX ADMIN — TRIAMCINOLONE ACETONIDE: 1 CREAM TOPICAL at 21:30

## 2018-12-01 RX ADMIN — Medication 500 MG: at 15:28

## 2018-12-01 RX ADMIN — Medication 1 MG: at 22:32

## 2018-12-01 ASSESSMENT — ACTIVITIES OF DAILY LIVING (ADL)
ORAL_HYGIENE: TOTAL CARE
DRESS: STREET CLOTHES;PROMPTS
LAUNDRY: UNABLE TO COMPLETE
LAUNDRY: UNABLE TO COMPLETE
DRESS: SCRUBS (BEHAVIORAL HEALTH)
GROOMING: SHOWER;TOTAL CARE
ORAL_HYGIENE: PROMPTS
GROOMING: PROMPTS

## 2018-12-01 NOTE — CONSULTS
Consult Date:  11/29/2018      PSYCHIATRIC CONSULTATION       REQUESTING SOURCE:  Wei Powell MD      REASON FOR CONSULTATION:  Please evaluate second opinion for ECT.      IDENTIFICATION:  Ms. Cash is a 53-year-old   woman who lives in [place] with her  and sons, ages 24 and 21.  She is followed outpatient by Mercedes Aguero, nurse practitioner at [facility].  She previously saw Dr. Juno Walsh at [facility] for years.  She has a history of depression, anxiety, and OCD and is currently admitted to station 10 since 11/16/2018, presenting with worsening depression, OCD, and poor oral intake.  She is seen by Dr. Wang at the request of Dr. Powell to evaluate second opinion for ECT.      HISTORY OF PRESENT ILLNESS:  Ms. Cash was staffed by Dr. Wang on 11/29/2018.  She says depression started after the birth of a child 24 years ago.  She started developing OCD symptoms.  She was obsessing about having a hemorrhage and bleeding out.  She has various other OCD symptoms that have developed over the years, including constantly straightening things, double checking things, walking around the house checking on things and making sure that all of her stuff is there and in proper order.  She has had at least 3 psychiatric admissions with her depression and OCD.  This time,  she presented to the [facility] ER 11/16/2018.  She came in voluntarily.  Her daughter, brother, and sister were all present in the emergency room.  Family had brought her in.  She has lost about 30 pounds over the summer.  She has been on Paxil 20 mg daily for 20 years, as well as low dose Klonopin 0.5 mg daily.        Over the past year, her symptoms have increased.  She has become more depressed with insomnia but also hypersomnia, sometimes sleeping 17-24 hours at a time.  She is amotivated.  She will sit in her chair at home all day long, get up to smoke, drink some water, or use the bathroom.  OCD symptoms have  worsened with counting, checking, and straightening things.  She obsesses that she may have some medical illness such as cancer and fears that she is denying.  Food has no taste.  She has no hunger, and she eats once a day.  She denied suicidal or homicidal thoughts or psychotic symptoms and stated she wanted to get better, but was afraid of any changes in her medication.      Ms. Cash's daughter reported that Ms. Cash has had symptoms throughout most of daughter's life with periods of waxing and waning.  Daughter believed that the current exacerbation was triggered in late 2018, when Ms. Cash's 23-year-old son had an unintentional drug overdose on cocaine and nearly .  He is doing well now.  Ms. Cash has been somatic, worrying about dying and disease.  She has been spending an extreme amount of time checking things and counting things.  She also spends most of her time sitting in a chair at home crying and anxious.  Family felt she was not able to care for herself.        Ms. Cash tells Dr. Wang that she is worried she has cancer.  She has been depressed, crying a lot, and losing weight.  She is quite anxious.  She says she sleeps all the time.  She has no appetite.  She does not eat for a couple of days at a time.  She does not want to come out of her bedroom to eat.  She has lost 30 pounds this year.  Low weight was 95.5 pounds.  High weight was 125 pounds.  Her BMI currently is 17.8.  She is anhedonic.  Energy level is poor.  Libido is impaired.  Concentration is poor.  She feels hopeless.  She is scared that she will not get better.  She is scared that something is going on medically.  She feels helpless.  Self-esteem is okay.  She denies guilt.  She has crying spells.  She denies suicidal or homicidal thoughts.  She denies psychotic symptoms.  She says she gets a little sweaty and her heart rate may raise when she gets anxious, but she did not detail any other panic symptoms.  She does say that  with her OCD, she is very anxious about her health and worries about her kids, but says that is normal.  She denied generalized anxiety symptoms.  She denies PTSD symptoms.  Memory has been worse lately but is usually good.  Her main physical complaint is worrying that she has cancer.  She denies eating disorder or gambling.  Stressors include worrying about her health, the refrigerator went out, her oven does not work, the gas was turned off because they had no money to pay the gas bill.  Her 's Social Security Disability was stopped because he forgot to fill out the papers.  Her 21-year-old son overdosed on cocaine in , but he is okay now.      PAST PSYCHIATRIC HISTORY:  Ms. Cash says she had depression starting 24 years ago after the birth of a child.  She started having OCD.  She was obsessed that she was going to hemorrhage to death, and she started straightening things and checking things.  Twenty-four years ago, she saw Dr. Juno Walsh at [facility] and was put on Paxil and Klonopin and has been on those medications ever since.  She had a psychiatric admission in  at [facility] after the birth of her son.  She had depression and OCD and was worried about hemorrhage.  She had a [facility] psychiatric admission in .  She was again worried about bleeding.        Psychotropic medications used have included Paxil, Klonopin, Wellbutrin, trazodone, hydroxyzine, Neurontin, and Wellbutrin.  Wellbutrin was added this admission and Paxil was increased from 20 mg to 40 mg daily.  She has no history of suicide attempts.  She has never had ECT.  She has no therapist.  Her medication provider is Mercedes Aguero DNP.      CHEMICAL DEPENDENCY HISTORY:  Ms. Cash denies current or past use or abuse of drugs or alcohol.  She smokes a half pack of cigarettes per day.  She never had chemical dependency treatment.      PAST MEDICAL HISTORY:  No medical illnesses, history of  x 3, history of  lithotripsy.      MEDICATIONS:     Current Facility-Administered Medications:      acetaminophen (TYLENOL) tablet 325 mg, 325 mg, Oral, Q4H PRN, Wei Powell MD     acetylcysteine (N-ACETYL CYSTEINE) capsule CAPS 500 mg, 500 mg, Oral, Daily, Wei Powell MD, 500 mg at 18 1401     buPROPion (WELLBUTRIN) tablet 100 mg, 100 mg, Oral, Daily, Wei Powell MD, 100 mg at 18 1213     clonazePAM (klonoPIN) tablet 0.5 mg, 0.5 mg, Oral, Daily, Brandt Escalona MD, 0.5 mg at 18 1402     docusate sodium (COLACE) capsule 100 mg, 100 mg, Oral, BID PRN, Wei Powell MD     gabapentin (NEURONTIN) capsule 300 mg, 300 mg, Oral, TID PRN, Wei Powell MD     hydrOXYzine (ATARAX) tablet 25 mg, 25 mg, Oral, Q4H PRN, Brandt Escalona MD     influenza recomb quadrivalent PF (FLUBLOK) injection 0.5 mL, 0.5 mL, Intramuscular, Prior to discharge, Wei Powell MD, Stopped at 18 1436     levothyroxine (SYNTHROID/LEVOTHROID) tablet 25 mcg, 25 mcg, Oral, LIBERTAD WILSON, Adrianne Ball PA-C, 25 mcg at 18 0914     melatonin tablet 1 mg, 1 mg, Oral, At Bedtime PRN, Praveen Feldman MD     PARoxetine (PAXIL) tablet 40 mg, 40 mg, Oral, Daily with lunch, Joe Dillon MD, 40 mg at 18 1402     traZODone (DESYREL) tablet 50 mg, 50 mg, Oral, At Bedtime PRN, Brandt Escalona MD     triamcinolone (KENALOG) 0.1 % cream, , Topical, BID, Meredith Lemus APRN CNP     ALLERGIES:  METRONIDAZOLE, PENICILLIN.      FAMILY HISTORY:  Brother has OCD.  Son abused cocaine and accidently overdosed on it.  Father was an alcoholic.  She denies a family history of suicide.      SOCIAL HISTORY:  Ms. Cash was born in [place].  She was raised in [place] by her parents.  She has 4 brothers.  She had 2 sisters, one of them  as an infant.  Father worked for the city.  Mother worked at [employer] on [address] in [place].  She denies any history of physical, sexual or  emotional abuse.  Her parents stayed together.  Father  at age 55 of a heart attack.  Mother is 84 years old.  One of Ms. Cash's brothers lives with mom.  Ms. Cash is a high school graduate.  She has been  for 25 years.  She has 2 sons, ages 24 and 21, and has a daughter, age 26.  She lives with her  and 2 sons in [place].  She has not worked since the early .  She never applied for Social Security Disability.  Her  had Social Security Disability, but it lapsed because he did not fill out the paperwork.  They have significant financial problems.  She denies legal problems.  She was never in the .      MENTAL STATUS EXAMINATION:  Ms. Cash is an adequately groomed, somewhat frail-appearing 53-year-old  woman looking her stated age.  Gait and station are normal.  Psychomotor activity is within normal limits.  Speech is fluent and normal in rate.  Language is normal.  Mood is depressed and anxious.  Affect is sad and anxious.  Attention and concentration are mildly impaired.  Thought process is fairly normal.  Associations are normal.  She denied any psychotic symptoms.  She denied suicidal or homicidal thoughts.  Fund of knowledge is adequate.  Remote and recent memory are adequate.  Insight and judgment appear adequate.  She was alert and oriented x 3.      Ms. Cash was quite obsessional during the interview.  She asked questions over and over and needed a lot of reassurance.  We discussed the pros and cons of ECT, as well as the risks and benefits.  She appears to have the capacity to give informed consent for ECT.  She has not at all decided whether or not she will pursue ECT.  She wants to think about it.  She will likely obsess about it.  She wants to discuss it with her family.  She wants to watch the ECT video with her family and then discuss further and think about it further.  ECT is a reasonable treatment option in light of the severity of her illness and  the impact it is currently having on her life.      DIAGNOSES:   Axis I:     1.  Major depressive disorder, recurrent.   2.  Obsessive-compulsive disorder.   Axis II:  Deferred.   Axis III:  History of subclinical hypothyroidism.      PLAN:   1.  Ms. Cash was medically cleared for ECT.   2.  Ms. Cash will watch the ECT video with her family and decide whether or not she wants to pursue ECT.  At this time, she has not decided.  She does appear to have the capacity to give informed consent for ECT should she choose to do so.   3.  Please inform Dr. Hammond if Ms. Cash decides to go ahead with ECT.         MAU HAMMOND MD             D: 2018   T: 2018   MT: SHIRA      Name:     MARIS CASH   MRN:      [id number]        Account:       [id number]   :      1965           Consult Date:  2018      Document: [id number]       cc: Wei Powell MD

## 2018-12-01 NOTE — PROGRESS NOTES
The pt was calm with blunted affect, isolative to room, withdrawn from others, did not attend groups today. She reports not wanting to start ECT because she is worried about it. Endorses depression and anxiety, denies SI/SIB thoughts.      11/30/18 2200   Behavioral Health   Hallucinations denies / not responding to hallucinations   Thinking poor concentration   Orientation person: oriented;place: oriented;date: oriented;time: oriented   Memory baseline memory   Insight poor   Judgement impaired   Eye Contact at examiner   Affect blunted, flat   Mood mood is calm   Physical Appearance/Attire attire appropriate to age and situation   Hygiene neglected grooming - unclean body, hair, teeth   Suicidality other (see comments)  (Denies)   1. Wish to be Dead No   2. Non-Specific Active Suicidal Thoughts  No   Self Injury other (see comment)  (Denies)   Elopement (Nothing stated or observed)   Activity isolative;withdrawn   Speech clear;coherent   Medication Sensitivity no stated side effects;no observed side effects   Psychomotor / Gait balanced;steady   Activities of Daily Living   Hygiene/Grooming prompts   Oral Hygiene prompts   Dress street clothes   Room Organization independent

## 2018-12-02 PROCEDURE — 25000132 ZZH RX MED GY IP 250 OP 250 PS 637

## 2018-12-02 PROCEDURE — 25000132 ZZH RX MED GY IP 250 OP 250 PS 637: Performed by: PSYCHIATRY & NEUROLOGY

## 2018-12-02 PROCEDURE — 12400007 ZZH R&B MH INTERMEDIATE UMMC

## 2018-12-02 PROCEDURE — 25000132 ZZH RX MED GY IP 250 OP 250 PS 637: Performed by: PHYSICIAN ASSISTANT

## 2018-12-02 PROCEDURE — 25000132 ZZH RX MED GY IP 250 OP 250 PS 637: Performed by: EMERGENCY MEDICINE

## 2018-12-02 RX ADMIN — TRIAMCINOLONE ACETONIDE: 1 CREAM TOPICAL at 19:47

## 2018-12-02 RX ADMIN — Medication 1 MG: at 21:32

## 2018-12-02 RX ADMIN — LEVOTHYROXINE SODIUM 25 MCG: 25 TABLET ORAL at 10:03

## 2018-12-02 RX ADMIN — Medication 40 MG: at 13:55

## 2018-12-02 RX ADMIN — BUPROPION HYDROCHLORIDE 100 MG: 100 TABLET, FILM COATED ORAL at 14:00

## 2018-12-02 RX ADMIN — Medication 500 MG: at 14:00

## 2018-12-02 RX ADMIN — Medication 1 MG: at 22:33

## 2018-12-02 RX ADMIN — CLONAZEPAM 0.5 MG: 0.5 TABLET ORAL at 13:55

## 2018-12-02 ASSESSMENT — ACTIVITIES OF DAILY LIVING (ADL)
ORAL_HYGIENE: PROMPTS
GROOMING: HANDWASHING
HYGIENE/GROOMING: INDEPENDENT
ORAL_HYGIENE: PROMPTS
LAUNDRY: WITH SUPERVISION
DRESS: INDEPENDENT;STREET CLOTHES
DRESS: INDEPENDENT
LAUNDRY: WITH SUPERVISION

## 2018-12-02 NOTE — PLAN OF CARE
"Problem: Mood Impairment (Depressive Signs/Symptoms) (Adult)  Goal: Improved Mood Symptoms (Depressive Signs/Symptoms)  Outcome: No Change  \"My stomach feels a little better today.  I'm still anxious though.\"  Continues to decline breakfast every day as did today.  Indicated would eat some lunch and drink a boost.  States is thinking about ECT, but is reluctant to watch ECT educational video until family is able to watch it with her.  Would like to have a family meeting with Dr Block, Dr Wang and family tomorrow to discuss ECT.  Was encouraged to watch video prior to this meeting to be able to ask specific questions, but declines to do this.  No change in depression or anxiety level.  Continues to deny suicidal ideation.  Continues to ask many questions regarding the administration time of medications and discuss when should take these. Indicates concentration and focus continue to be difficult.  States slept better last night and feels it may have been due to taking Melatonin last evening.  Remained in bed sleeping until awakened to take Levothyroxine.  Indicated had questions regarding ECT, but did not want to discuss it at that time as was \"too tired\" and returned to bed.      "

## 2018-12-02 NOTE — PROGRESS NOTES
Patient ate all of macaroni and cheese and vegetables along with boost, but no side dishes for lunch.

## 2018-12-02 NOTE — PROGRESS NOTES
Pt was withdrawn to room, sleeping and napping for first part of the shift.  Pt came out for dinner and ate 100% of dinner.  Pt was social with writer and nurse upon approach, and when asked how her day was going reported that today wasn't going very well for her.  Pt stated nothing in particular about day, just that it was part of her depression.  Pt denied SI/SIB to writer, spent time in vega talking to staff, ate dinner in lounge, and was less withdrawn in second part of shift compared to beginning.       12/01/18 1914   Behavioral Health   Hallucinations denies / not responding to hallucinations   Thinking intact;poor concentration   Orientation person: oriented;place: oriented   Memory baseline memory   Insight poor   Judgement impaired   Eye Contact at examiner   Affect blunted, flat;sad   Mood depressed;anxious   Physical Appearance/Attire untidy;disheveled   Hygiene neglected grooming - unclean body, hair, teeth   Suicidality other (see comments)  (declined)   1. Wish to be Dead No   2. Non-Specific Active Suicidal Thoughts  No   Self Injury other (see comment)  (none stated or observed)   Elopement (none stated)   Activity isolative;withdrawn;restless   Speech clear;coherent   Medication Sensitivity no stated side effects   Psychomotor / Gait balanced;steady   Safety   Suicidality Status 15   Elopement status 15   Psycho Education   Type of Intervention 1:1 intervention   Response unavailable   Activities of Daily Living   Hygiene/Grooming prompts  (did not shower)   Oral Hygiene prompts   Dress street clothes;prompts   Laundry unable to complete   Room Organization independent   Behavioral Health Interventions   Depression maintain safety precautions;encourage nutrition and hydration;encourage participation / independence with adls;provide emotional support;establish therapeutic relationship;assist with developing and utilizing healthy coping strategies;build upon strengths   Social and Therapeutic  Interventions (Depression) encourage socialization with peers;encourage effective boundaries with peers;encourage participation in therapeutic groups and milieu activities

## 2018-12-03 PROCEDURE — 99207 ZZC CDG-MDM COMPONENT: MEETS MODERATE - UP CODED: CPT | Performed by: PSYCHIATRY & NEUROLOGY

## 2018-12-03 PROCEDURE — 12400007 ZZH R&B MH INTERMEDIATE UMMC

## 2018-12-03 PROCEDURE — 99232 SBSQ HOSP IP/OBS MODERATE 35: CPT | Performed by: PSYCHIATRY & NEUROLOGY

## 2018-12-03 PROCEDURE — 25000132 ZZH RX MED GY IP 250 OP 250 PS 637: Performed by: PHYSICIAN ASSISTANT

## 2018-12-03 PROCEDURE — 25000132 ZZH RX MED GY IP 250 OP 250 PS 637: Performed by: PSYCHIATRY & NEUROLOGY

## 2018-12-03 PROCEDURE — 25000132 ZZH RX MED GY IP 250 OP 250 PS 637

## 2018-12-03 PROCEDURE — 25000132 ZZH RX MED GY IP 250 OP 250 PS 637: Performed by: EMERGENCY MEDICINE

## 2018-12-03 RX ORDER — LANOLIN ALCOHOL/MO/W.PET/CERES
3 CREAM (GRAM) TOPICAL
Status: DISCONTINUED | OUTPATIENT
Start: 2018-12-03 | End: 2019-01-11 | Stop reason: HOSPADM

## 2018-12-03 RX ADMIN — LEVOTHYROXINE SODIUM 25 MCG: 25 TABLET ORAL at 09:31

## 2018-12-03 RX ADMIN — BUPROPION HYDROCHLORIDE 100 MG: 100 TABLET, FILM COATED ORAL at 13:45

## 2018-12-03 RX ADMIN — Medication 500 MG: at 13:45

## 2018-12-03 RX ADMIN — Medication 40 MG: at 13:47

## 2018-12-03 RX ADMIN — CLONAZEPAM 0.5 MG: 0.5 TABLET ORAL at 13:45

## 2018-12-03 RX ADMIN — MELATONIN TAB 3 MG 3 MG: 3 TAB at 22:33

## 2018-12-03 ASSESSMENT — ACTIVITIES OF DAILY LIVING (ADL)
HYGIENE/GROOMING: INDEPENDENT
LAUNDRY: WITH SUPERVISION
GROOMING: INDEPENDENT
DRESS: INDEPENDENT
ORAL_HYGIENE: INDEPENDENT
ORAL_HYGIENE: INDEPENDENT
DRESS: STREET CLOTHES;INDEPENDENT

## 2018-12-03 NOTE — PROGRESS NOTES
"   12/02/18 2135   Behavioral Health   Hallucinations denies / not responding to hallucinations   Thinking poor concentration   Orientation person: oriented;place: oriented;date: oriented;time: oriented   Memory baseline memory   Insight poor   Judgement impaired   Eye Contact at examiner   Affect blunted, flat   Mood anxious   Physical Appearance/Attire attire appropriate to age and situation   Hygiene body odor   Suicidality other (see comments)  (denies)   1. Wish to be Dead No   2. Non-Specific Active Suicidal Thoughts  No   Self Injury other (see comment)  (pt denies)   Activity refusal   Speech clear;coherent   Medication Sensitivity no stated side effects   Psychomotor / Gait balanced;steady   Activities of Daily Living   Hygiene/Grooming independent   Oral Hygiene prompts   Dress independent   Laundry with supervision   Room Organization independent   Behavioral Health Interventions   Depression maintain safety precautions;monitor need to revise level of observation;maintain safe secure environment;assist patient in developing safety plan;assist patient in following safety plan;encourage nutrition and hydration;encourage participation / independence with adls;provide emotional support   Social and Therapeutic Interventions (Depression) encourage socialization with peers;encourage participation in therapeutic groups and milieu activities   pt was in and out on the milieu. Clam and pleasant. Pt spent the majority of the evening in her room. Pt reported of feeling anxious and depressed and rated both at 8. Pt visited with her Children. She said, \"it was good\". Pt denies other concern.   "

## 2018-12-03 NOTE — PROGRESS NOTES
"Writer met with Pt who asked about status of family meeting, Writer encouraged Pt to talk with Dr. Powell as he is setting up the family meeting with Pt's brother. Pt stated, \"I want to talk about ECT with family, Dr. Powell, and Dr. Wang\". Writer explained we don't typically schedule family meetings for ECT psychiatrists, and asked Pt to discuss request with Dr. Powell. Pt appeared somewhat upset stated, \"well, I want Dr. Wang there, we will be talking about ECT so doesn't it make sense to have him there?\" Writer again encouraged Pt to discuss request with Dr. Powell.   "

## 2018-12-04 PROCEDURE — 25000132 ZZH RX MED GY IP 250 OP 250 PS 637: Performed by: EMERGENCY MEDICINE

## 2018-12-04 PROCEDURE — 25000132 ZZH RX MED GY IP 250 OP 250 PS 637

## 2018-12-04 PROCEDURE — 99232 SBSQ HOSP IP/OBS MODERATE 35: CPT | Performed by: PSYCHIATRY & NEUROLOGY

## 2018-12-04 PROCEDURE — 99207 ZZC CDG-MDM COMPONENT: MEETS MODERATE - UP CODED: CPT | Performed by: PSYCHIATRY & NEUROLOGY

## 2018-12-04 PROCEDURE — 25000132 ZZH RX MED GY IP 250 OP 250 PS 637: Performed by: PSYCHIATRY & NEUROLOGY

## 2018-12-04 PROCEDURE — 12400007 ZZH R&B MH INTERMEDIATE UMMC

## 2018-12-04 PROCEDURE — 25000132 ZZH RX MED GY IP 250 OP 250 PS 637: Performed by: PHYSICIAN ASSISTANT

## 2018-12-04 RX ORDER — TRIAMCINOLONE ACETONIDE 1 MG/G
CREAM TOPICAL 2 TIMES DAILY PRN
Status: DISCONTINUED | OUTPATIENT
Start: 2018-12-04 | End: 2019-01-11 | Stop reason: HOSPADM

## 2018-12-04 RX ADMIN — BUPROPION HYDROCHLORIDE 100 MG: 100 TABLET, FILM COATED ORAL at 13:48

## 2018-12-04 RX ADMIN — Medication 500 MG: at 13:48

## 2018-12-04 RX ADMIN — CLONAZEPAM 0.5 MG: 0.5 TABLET ORAL at 13:48

## 2018-12-04 RX ADMIN — Medication 40 MG: at 13:49

## 2018-12-04 RX ADMIN — MELATONIN TAB 3 MG 3 MG: 3 TAB at 22:35

## 2018-12-04 RX ADMIN — LEVOTHYROXINE SODIUM 25 MCG: 25 TABLET ORAL at 08:51

## 2018-12-04 ASSESSMENT — ACTIVITIES OF DAILY LIVING (ADL)
DRESS: INDEPENDENT;STREET CLOTHES
GROOMING: INDEPENDENT
DRESS: STREET CLOTHES
ORAL_HYGIENE: INDEPENDENT
GROOMING: PROMPTS
LAUNDRY: WITH SUPERVISION
ORAL_HYGIENE: PROMPTS

## 2018-12-04 NOTE — PLAN OF CARE
Problem: Patient Care Overview  Goal: Team Discussion  Team Plan:    BEHAVIORAL TEAM DISCUSSION    Participants: Wei Powell MD, Janey FLORES and Constanza MCKEON RN  Progress: Pt continues to be mostly isolative to room, extremely malodorous and demonstrating severe OCD like symptoms. Pt is eating lunch and dinner.   Continued Stay Criteria/Rationale: Treatment team recommendation is ECT, Pt is weary to do ECT but is considering it as an option. Pt will have a family meeting this evening with Dr. Powell to determine next steps.   Medical/Physical: See medical consult.    Precautions:   Behavioral Orders   Procedures     Code 1 - Restrict to Unit     Routine Programming     As clinically indicated     Status 15     Every 15 minutes.     Plan: Hopefully Pt will undergo inpatient ECT for severe OCD and depression symptoms.   Rationale for change in precautions or plan: No change, ECT is the treatment recommendation, continue with plan of care.       Problem: General Plan of Care (Inpatient Behavioral)  Goal: Team Discussion  Team Plan:    BEHAVIORAL TEAM DISCUSSION    Participants: Wei Powell MD, Janey FLORES and Constanza MCKEON RN  Progress: Pt continues to be mostly isolative to room, extremely malodorous and demonstrating severe OCD like symptoms. Pt is eating lunch and dinner.   Continued Stay Criteria/Rationale: Treatment team recommendation is ECT, Pt is weary to do ECT but is considering it as an option. Pt will have a family meeting this evening with Dr. Powell to determine next steps.   Medical/Physical: See medical consult.    Precautions:   Behavioral Orders   Procedures     Code 1 - Restrict to Unit     Routine Programming     As clinically indicated     Status 15     Every 15 minutes.     Plan: Hopefully Pt will undergo inpatient ECT for severe OCD and depression symptoms.   Rationale for change in precautions or plan: No change, ECT is the treatment recommendation, continue with plan of care.

## 2018-12-04 NOTE — PROGRESS NOTES
Pt isolative to room, slept until 11:45am.  Pt expressed her worry for ECT and will be having a meeting with her family and doctors to discuss more about it this evening.  Rates anxiety and depression both at an 8/10.  Pt encouraged to shower and eat meals.  Denies hallucinations, SI/SIB, medication side effects and pain.        12/04/18 1200   Behavioral Health   Hallucinations denies / not responding to hallucinations   Thinking intact   Orientation person: oriented;place: oriented   Insight poor   Judgement impaired   Eye Contact at examiner   Affect blunted, flat;sad   Mood depressed;anxious   Physical Appearance/Attire disheveled;attire appropriate to age and situation   Hygiene neglected grooming - unclean body, hair, teeth   Suicidality other (see comments)  (denies)   1. Wish to be Dead No   2. Non-Specific Active Suicidal Thoughts  No   Self Injury other (see comment)  (denies)   Activity isolative;withdrawn   Speech clear;coherent   Medication Sensitivity no stated side effects;no observed side effects   Psychomotor / Gait balanced;steady   Psycho Education   Type of Intervention 1:1 intervention   Response participates, initiates socially appropriate   Hours 0.5   Treatment Detail check in   Activities of Daily Living   Hygiene/Grooming independent   Oral Hygiene independent   Dress independent;street clothes   Room Organization independent   Behavioral Health Interventions   Depression maintain safety precautions;encourage nutrition and hydration;encourage participation / independence with adls;provide emotional support;establish therapeutic relationship   Social and Therapeutic Interventions (Depression) encourage socialization with peers;encourage participation in therapeutic groups and milieu activities

## 2018-12-04 NOTE — PROGRESS NOTES
"[facility], [place]   Psychiatric Progress Note        Interim History:   Reason for Hospitalization:Gwen is a 53 year old female with a history of depression, anxiety, OCD who is admitted on a voluntary basis for worsening depressive moods, lack of oral intake, and worsened obsessive/compulsive symptoms.     Subjective: \" I want to get better, and want to be on the right medications\"     As per today's interview: The patient was seen in her room, less tearful compared to prior days, however continuously ruminative and obsessive on various things. Today, her obsessive thoughts were centered on various side effects of ECT. I took time to explain to her the ins and outs of ECT treatment along with possible side effects. She mentioned she had some oral intake today.          Medications:       acetylcysteine  500 mg Oral Daily     buPROPion  100 mg Oral Daily     clonazePAM  0.5 mg Oral Daily     influenza vaccine adult (product based on age)  0.5 mL Intramuscular Prior to discharge     levothyroxine  25 mcg Oral QAM AC     PAXIL  40 mg Oral Daily with lunch     triamcinolone   Topical BID          Allergies:     Allergies   Allergen Reactions     Metronidazole Unknown     Abstracted data , patient cannot remember      Penicillins Unknown     Abstracted data , patient cannot remember           Labs:     No results found for this or any previous visit (from the past 48 hour(s)).       Psychiatric Examination:   /63  Pulse 73  Temp 98.3  F (36.8  C) (Oral)  Resp 17  Ht 1.575 m (5' 2\")  Wt 44.2 kg (97 lb 6.4 oz)  SpO2 99%  BMI 17.81 kg/m2  Weight is 97 lbs 6.4 oz  Body mass index is 17.81 kg/(m^2).    Appearance:  female. Thin, malnourished appearing. Pale skin. Appears fatigued. Unkept/disheveled appearance. Malodorous. No acute distress.   Attitude:  Attentive, cooperative, however anxious and tends to ask the same questions repeatedly.   Eye Contact:  fair  Mood:  " anxious   Affect:  Improved range today.   Speech:  clear, coherent  Language: fluent and intact in English  Psychomotor, Gait, Musculoskeletal:  no evidence of tardive dyskinesia, dystonia, or tics  Throught Process:  logical  Associations:  no loose associations  Thought Content:  no evidence of suicidal ideation or homicidal ideation, no evidence of psychotic thought, no auditory hallucinations present and no visual hallucinations present  Insight:  partial  Judgement:  Fair-Poor   Oriented to:  time, person, and place  Attention Span and Concentration:  fair/limited   Recent and Remote Memory:  fair  Fund of Knowledge:  appropriate      Assessment & Plan       Principal Diagnosis:   Major Depressive Disorder, recurrent, severe  OCD  Illness Anxiety Disorder  Obsessive Compulsive Personality Disorder   Subclinical Hypothyroidism     Assessment:   (Initial Assessment): Patient appears to be experiencing worsening neurovegetative symptoms of depression, particularly apathy, avolition/amotivation, anergy, along with a serious weight loss within a short amount of time (4-5 months). Her weight loss appears to be related to her depression, but malignant etiology should be ruled out at some point. She also has worsened compulsions/obsessions characteristic of OCD. We discussed the need to initiate medications to help augment her mood, sleep and weight gain. Discussed starting Risperdal to help with OCD and depression augmentation, and Remeron for additional sleep/appetite improvement. Will work with medicine PA to assess for refeeding issues as time goes on, along with consult with nutritionist for recommendations on improved caloric intake.     Update 11/19: Improved oral intake (claims to be eating lunch and dinner most days, and drinking boost during meals). Had a long discussion about medications, during which she has many questions and kept on ruminating over the same issue over and over. Spoke to pt's daughter  about plan. Pt's Mg and electrolytes appear to have normalized. Pt refusing Risperdal. Discussed starting Wellbutrin for her amotivational, apathetic symptoms, along with the added benefit of smoking cessation. Discussed ECT, but patient refused at this time.     ------    Update 11/27: After an initial period of some improved oral intake and some increased behavior drive, she seems to have worsened. In there interim, Wellbutrin was discontinued, Paxil was increased. She has been mostly isolative in her room, tearful, ruminative about various worries, and decreased oral intake. I discussed adding back on a small dose of Wellbutrin since it seemed to improve her energy and volitional drive (as per patient). I will also add on N-acetylcysteine capsules to provide adjuctive treatment for her OCD. I continued to discuss ECT for her treatment resistant depression and OCD.     Update 11/29: Patient has been eating and drink minimally. Continues to be fearful for medications, and obsession about the dose/side effects, etc. Discussed the benefit of ECT, which she was half heartedly willing to consider for now, thus I will order the ECT consult to get the plan started. Collateral info from sister revealed that patient is chronically lower BMI throughout her life, and her weight loss was more in the range of 10-15 lbs instead of 30 lbs.     -----------------    Update 12/3: Some minimal food intake today. Contemplative about ECT, and rather obsessive about any and all associated side effects, including her regular obsession about her medications. Will continue to go forth with ECT plan. Recommended her to watch video with family, whom I primed (from my conversation yesterday with them) to encourage her to go through with it.      Plan:  1.) Medication Plan:  - Wellbutrin 100 mg today - for neurovegetative depressive symptoms   -  N-acetylcysteine 500 mg capsules - for adjunctive treatment for OCD    - Remeron 7.5 mg HS  -  Paxil 40 mg - can be increased by 10 mg a week up to 60 mg .   - Klonopin 0.5 mg Daily  - ECT consult/Medicing Consult/EKG - for treatment resistant depression and OCD.      2.) Psychosocial Plan:  - Work with [facility] to help arrange appropriate follow up care  - Nutritionist Consult (recommend they follow up)      Legal:  Voluntary (but hold-able)      Disposition:  Unclear, pending stabilization      Safety Assessment:   Checks: Status 15  Precautions: None  Pt has not required locked seclusion or restraints in the past 24 hours to maintain safety, please refer to RN documentation for further details.       The risks, benefits, alternatives and side effects have been discussed and are understood by the patient and other caregivers.         Attestation:  Patient has been seen and evaluated by me,  Wei Powell MD

## 2018-12-04 NOTE — PROGRESS NOTES
"Pt was in her room for the majority of the shift. She came out for dinner and to visit with her family during visiting hours. Pt was isolative and withdrawn, she was observed cleaning her room and sitting on her bed, and at times, crying. Pt's overall affect was blunted/flat and sad. Her mood was depressed and anxious. Pt endorsed anxiety and depression, she rated both 8/10. Pt denied SI, SIB, and AH. Pt ate 90% of her meal, but states \"my appetite still isn't there. I don't feel hungry, but I still eat just to put some food in me\". Pt continues to neglect hygiene cares, pt refused to shower. Writer encouraged her to shower, pt replied \"I know I need to do it, I just can't muster up the motivation to do it. Maybe tomorrow I will shower\". Pt expressed several concerns about her medications and starting ECT. Pt had several questions about how it works and what the effects would be, to which writer directed her to ask her doctor. Overall pt had an ok shift, there were no major concerns.        12/03/18 2151   Behavioral Health   Hallucinations denies / not responding to hallucinations   Thinking distractable;poor concentration   Orientation person: oriented;place: oriented   Insight poor   Judgement impaired   Eye Contact at examiner   Affect blunted, flat;sad   Mood depressed;anxious   Physical Appearance/Attire disheveled;attire appropriate to age and situation   Hygiene neglected grooming - unclean body, hair, teeth;body odor   Suicidality other (see comments)  (Denies)   1. Wish to be Dead No   2. Non-Specific Active Suicidal Thoughts  No   Self Injury (Denies, none observed)   Elopement (no statements made, none observed)   Activity isolative;withdrawn   Speech clear;coherent   Psychomotor / Gait balanced;steady   Activities of Daily Living   Hygiene/Grooming independent   Oral Hygiene independent   Dress street clothes;independent   Laundry with supervision   Room Organization independent     "

## 2018-12-04 NOTE — PROGRESS NOTES
"[facility], [place]   Psychiatric Progress Note        Interim History:   Reason for Hospitalization:Gwen is a 53 year old female with a history of depression, anxiety, OCD who is admitted on a voluntary basis for worsening depressive moods, lack of oral intake, and worsened obsessive/compulsive symptoms.     Subjective: \" I want to get better, and want to be on the right medications\"     As per today's interview: The patient was seen in her room, less tearful compared to prior days, however continuously ruminative and obsessive on various things. Later met with patients with various members of her family. Today, her obsessive thoughts were centered on various side effects of ECT. I took time to explain to her the ins and outs of ECT treatment along with possible side effects. She seemed open to starting it this Friday.          Medications:       acetylcysteine  500 mg Oral Daily     buPROPion  100 mg Oral Daily     clonazePAM  0.5 mg Oral Daily     influenza vaccine adult (product based on age)  0.5 mL Intramuscular Prior to discharge     levothyroxine  25 mcg Oral QAM AC     PAXIL  40 mg Oral Daily with lunch          Allergies:     Allergies   Allergen Reactions     Metronidazole Unknown     Abstracted data , patient cannot remember      Penicillins Unknown     Abstracted data , patient cannot remember           Labs:     No results found for this or any previous visit (from the past 48 hour(s)).       Psychiatric Examination:   /63  Pulse 73  Temp 99  F (37.2  C) (Oral)  Resp 16  Ht 1.575 m (5' 2\")  Wt 44.5 kg (98 lb 1.6 oz)  SpO2 99%  BMI 17.94 kg/m2  Weight is 98 lbs 1.6 oz  Body mass index is 17.94 kg/(m^2).    Appearance:  female. Thin, malnourished appearing. Pale skin. Appears fatigued. Unkept/disheveled appearance. Malodorous. No acute distress.   Attitude:  Attentive, cooperative, however anxious and tends to ask the same questions repeatedly.   Eye " Contact:  fair  Mood:  anxious   Affect:  Improved range today.   Speech:  clear, coherent  Language: fluent and intact in English  Psychomotor, Gait, Musculoskeletal:  no evidence of tardive dyskinesia, dystonia, or tics  Throught Process:  logical  Associations:  no loose associations  Thought Content:  no evidence of suicidal ideation or homicidal ideation, no evidence of psychotic thought, no auditory hallucinations present and no visual hallucinations present  Insight:  partial  Judgement:  Fair-Poor   Oriented to:  time, person, and place  Attention Span and Concentration:  fair/limited   Recent and Remote Memory:  fair  Fund of Knowledge:  appropriate      Assessment & Plan       Principal Diagnosis:   Major Depressive Disorder, recurrent, severe  OCD  Illness Anxiety Disorder  Obsessive Compulsive Personality Disorder   Subclinical Hypothyroidism     Assessment:   (Initial Assessment): Patient appears to be experiencing worsening neurovegetative symptoms of depression, particularly apathy, avolition/amotivation, anergy, along with a serious weight loss within a short amount of time (4-5 months). Her weight loss appears to be related to her depression, but malignant etiology should be ruled out at some point. She also has worsened compulsions/obsessions characteristic of OCD. We discussed the need to initiate medications to help augment her mood, sleep and weight gain. Discussed starting Risperdal to help with OCD and depression augmentation, and Remeron for additional sleep/appetite improvement. Will work with medicine PA to assess for refeeding issues as time goes on, along with consult with nutritionist for recommendations on improved caloric intake.     Update 11/19: Improved oral intake (claims to be eating lunch and dinner most days, and drinking boost during meals). Had a long discussion about medications, during which she has many questions and kept on ruminating over the same issue over and over.  Spoke to pt's daughter about plan. Pt's Mg and electrolytes appear to have normalized. Pt refusing Risperdal. Discussed starting Wellbutrin for her amotivational, apathetic symptoms, along with the added benefit of smoking cessation. Discussed ECT, but patient refused at this time.     ------    Update 11/27: After an initial period of some improved oral intake and some increased behavior drive, she seems to have worsened. In there interim, Wellbutrin was discontinued, Paxil was increased. She has been mostly isolative in her room, tearful, ruminative about various worries, and decreased oral intake. I discussed adding back on a small dose of Wellbutrin since it seemed to improve her energy and volitional drive (as per patient). I will also add on N-acetylcysteine capsules to provide adjuctive treatment for her OCD. I continued to discuss ECT for her treatment resistant depression and OCD.     Update 11/29: Patient has been eating and drink minimally. Continues to be fearful for medications, and obsession about the dose/side effects, etc. Discussed the benefit of ECT, which she was half heartedly willing to consider for now, thus I will order the ECT consult to get the plan started. Collateral info from sister revealed that patient is chronically lower BMI throughout her life, and her weight loss was more in the range of 10-15 lbs instead of 30 lbs.     -----------------    Update 12/4: Some minimal food intake today. Contemplative about ECT, and rather obsessive about any and all associated side effects, including her regular obsession about her medications. Met with her family, who had visited her today. Discussed the various ins and out of ECT therapy. The outcome of the family session seemed to indicate that she would potentially start ECT on Friday. Will continue to go forth with ECT plan starting Friday.      Plan:  1.) Medication Plan:  - Wellbutrin 100 mg today - for neurovegetative depressive symptoms   -   N-acetylcysteine 500 mg capsules - for adjunctive treatment for OCD    - Remeron 7.5 mg HS  - Paxil 40 mg - can be increased by 10 mg a week up to 60 mg .   - Klonopin 0.5 mg Daily  - ECT (starting this Friday)- for treatment resistant depression and OCD.      2.) Psychosocial Plan:  - Work with [facility] to help arrange appropriate follow up care  - Nutritionist Consult team has been following up      Legal:  Voluntary (but hold-able)      Disposition:  Unclear, pending stabilization      Safety Assessment:   Checks: Status 15  Precautions: None  Pt has not required locked seclusion or restraints in the past 24 hours to maintain safety, please refer to RN documentation for further details.       The risks, benefits, alternatives and side effects have been discussed and are understood by the patient and other caregivers.         Attestation:  Patient has been seen and evaluated by me,  Wei Powell MD

## 2018-12-05 PROCEDURE — 25000132 ZZH RX MED GY IP 250 OP 250 PS 637: Performed by: PHYSICIAN ASSISTANT

## 2018-12-05 PROCEDURE — 12400007 ZZH R&B MH INTERMEDIATE UMMC

## 2018-12-05 PROCEDURE — 25000132 ZZH RX MED GY IP 250 OP 250 PS 637

## 2018-12-05 PROCEDURE — 25000132 ZZH RX MED GY IP 250 OP 250 PS 637: Performed by: EMERGENCY MEDICINE

## 2018-12-05 PROCEDURE — 25000132 ZZH RX MED GY IP 250 OP 250 PS 637: Performed by: PSYCHIATRY & NEUROLOGY

## 2018-12-05 RX ADMIN — BUPROPION HYDROCHLORIDE 100 MG: 100 TABLET, FILM COATED ORAL at 14:38

## 2018-12-05 RX ADMIN — CLONAZEPAM 0.5 MG: 0.5 TABLET ORAL at 14:38

## 2018-12-05 RX ADMIN — Medication 40 MG: at 14:38

## 2018-12-05 RX ADMIN — MELATONIN TAB 3 MG 3 MG: 3 TAB at 22:39

## 2018-12-05 RX ADMIN — LEVOTHYROXINE SODIUM 25 MCG: 25 TABLET ORAL at 09:58

## 2018-12-05 RX ADMIN — Medication 500 MG: at 14:38

## 2018-12-05 NOTE — PROGRESS NOTES
"Pt slept through breakfast and ate apprx 40-50% of her lunch. Pt said she has no appetite adding, \"I'll start ordering my food after I get some ECTs\". Pt is very anxious about ECT, namely what will happen afterwards. She asked that on the day of ECT that her door stays open and her lights stay on; \"That way if something happens to me you will know and will be able to help\". Pt rated her depression and anxiety each an \"7\". Pt denies SI and SIB thoughts.     12/05/18 1421   Behavioral Health   Hallucinations denies / not responding to hallucinations   Thinking distractable;poor concentration   Orientation person: oriented;place: oriented;date: oriented;time: oriented   Memory baseline memory   Insight poor   Judgement impaired   Eye Contact at examiner   Affect (neutral)   Mood depressed;anxious   Physical Appearance/Attire untidy;disheveled   Hygiene neglected grooming - unclean body, hair, teeth;body odor   Suicidality (denies)   1. Wish to be Dead No   2. Non-Specific Active Suicidal Thoughts  No   Self Injury (denies)   Elopement (WDL) WDL   Activity isolative;withdrawn   Speech clear;coherent   Medication Sensitivity no stated side effects;no observed side effects   Psychomotor / Gait balanced;steady     "

## 2018-12-05 NOTE — PLAN OF CARE
"Problem: Emotion/Mood Impairment (Obsessive-Compulsive Signs/Symptoms) (Adult)  Goal: Improved Mood Symptoms (Obsessive-Compulsive Signs/Symptoms)  Outcome: No Change  48 hour nursing assessment:    Patient met with family (mom, sister, son, daughter and 2 brothers) tonight to watch ECT video and have meeting with Dr. Powell. Family was her from 1730-8. Patient has decided to do ECT treatments and will start on Friday. She is worried something will happen such as heart attack, not waking up etc. She has been in her room all shift. She states she was in checking everything and worrying about ECT. She did come out for dinner and ate 100%. She is calm and cooperative. She is not social with peers. She did not attend any groups. She rates her depression a 5 and anxiety a 5. She states her concentration is \"off\" but denies racing thoughts. She states she is feeling hopeful. She denies SI and SIB. She still has not taken a shower. When this writer asked her why she stated \"it just doesn't feel right yet\". She asked if her daughter could come in and help her. This writer encouraged it. She has very strong body odor. She is disheveled with poor hygiene. Her appetite and sleep are good. She denies pain. She stated her  has not come to visit since admission. She became weepy and stated \"he just doesn't understand depression and why I cant just shake this\". Her affect is flat but brightens upon approach.       "

## 2018-12-06 PROCEDURE — 25000132 ZZH RX MED GY IP 250 OP 250 PS 637: Performed by: EMERGENCY MEDICINE

## 2018-12-06 PROCEDURE — 25000132 ZZH RX MED GY IP 250 OP 250 PS 637: Performed by: PSYCHIATRY & NEUROLOGY

## 2018-12-06 PROCEDURE — 99232 SBSQ HOSP IP/OBS MODERATE 35: CPT | Performed by: PSYCHIATRY & NEUROLOGY

## 2018-12-06 PROCEDURE — 99207 ZZC CDG-MDM COMPONENT: MEETS MODERATE - UP CODED: CPT | Performed by: PSYCHIATRY & NEUROLOGY

## 2018-12-06 PROCEDURE — 25000132 ZZH RX MED GY IP 250 OP 250 PS 637: Performed by: PHYSICIAN ASSISTANT

## 2018-12-06 PROCEDURE — 12400007 ZZH R&B MH INTERMEDIATE UMMC

## 2018-12-06 PROCEDURE — 25000132 ZZH RX MED GY IP 250 OP 250 PS 637

## 2018-12-06 RX ADMIN — Medication 500 MG: at 14:52

## 2018-12-06 RX ADMIN — MELATONIN TAB 3 MG 3 MG: 3 TAB at 22:46

## 2018-12-06 RX ADMIN — LEVOTHYROXINE SODIUM 25 MCG: 25 TABLET ORAL at 09:23

## 2018-12-06 RX ADMIN — BUPROPION HYDROCHLORIDE 100 MG: 100 TABLET, FILM COATED ORAL at 14:52

## 2018-12-06 RX ADMIN — CLONAZEPAM 0.5 MG: 0.5 TABLET ORAL at 14:52

## 2018-12-06 RX ADMIN — Medication 40 MG: at 14:52

## 2018-12-06 ASSESSMENT — ACTIVITIES OF DAILY LIVING (ADL)
ORAL_HYGIENE: PROMPTS
DRESS: PROMPTS
GROOMING: PROMPTS
DRESS: PROMPTS
HYGIENE/GROOMING: PROMPTS
ORAL_HYGIENE: PROMPTS
LAUNDRY: WITH SUPERVISION

## 2018-12-06 NOTE — PLAN OF CARE
Problem: Patient Care Overview  Goal: Individualization & Mutuality  Illness Management Recovery model:  Feedback.    Patient identified the following people they would like to receive feedback from if/when they see early warning signs:        Family(s): Mother, sister, brother and daughter.    Partner/Spouse:  at times.

## 2018-12-06 NOTE — PLAN OF CARE
"Problem: Cognitive Impairment (Obsessive-Compulsive Signs/Symptoms) (Adult)  Goal: Improved Focus and Information Retention (Obsessive-Compulsive Signs/Symptoms)  Outcome: No Change  \"I'm just so nervous about ECT.  I'm afraid something will happen when I get back from downstairs or during the night and no one will know about it.\"  Is requesting that staff check her closely through out the night, states may want to sleep in the lounge. Has been asking questions about ECT the past 2 or 3 days.  Needs much reassurance regarding this.  Slept about 5 hours last night, as is worried about  ECT tomorrow.  Continues to deny suicidal ideation, wish to be dead or self injurious thoughts. Declined breakfast this morning asper usual.  Has been eating about 50% of lunch and 100% of supper as did yesterday.  Will drink boost from lunch and supper.  Continues to also not attend groups, is very isolative, stays in bed in the morning and remains awake, but in room after noon.  Very little if any interaction with peers.      "

## 2018-12-07 ENCOUNTER — ANESTHESIA EVENT (OUTPATIENT)
Dept: BEHAVIORAL HEALTH | Facility: CLINIC | Age: 53
End: 2018-12-07

## 2018-12-07 ENCOUNTER — ANESTHESIA (OUTPATIENT)
Dept: BEHAVIORAL HEALTH | Facility: CLINIC | Age: 53
End: 2018-12-07

## 2018-12-07 PROCEDURE — 25000125 ZZHC RX 250: Performed by: ANESTHESIOLOGY

## 2018-12-07 PROCEDURE — 25000132 ZZH RX MED GY IP 250 OP 250 PS 637: Performed by: PHYSICIAN ASSISTANT

## 2018-12-07 PROCEDURE — 25000132 ZZH RX MED GY IP 250 OP 250 PS 637

## 2018-12-07 PROCEDURE — 25000128 H RX IP 250 OP 636: Performed by: ANESTHESIOLOGY

## 2018-12-07 PROCEDURE — 25000132 ZZH RX MED GY IP 250 OP 250 PS 637: Performed by: EMERGENCY MEDICINE

## 2018-12-07 PROCEDURE — 90870 ELECTROCONVULSIVE THERAPY: CPT

## 2018-12-07 PROCEDURE — 12400007 ZZH R&B MH INTERMEDIATE UMMC

## 2018-12-07 PROCEDURE — 25000132 ZZH RX MED GY IP 250 OP 250 PS 637: Performed by: PSYCHIATRY & NEUROLOGY

## 2018-12-07 PROCEDURE — GZB0ZZZ ELECTROCONVULSIVE THERAPY, UNILATERAL-SINGLE SEIZURE: ICD-10-PCS | Performed by: PSYCHIATRY & NEUROLOGY

## 2018-12-07 RX ADMIN — Medication 40 MG: at 13:45

## 2018-12-07 RX ADMIN — BUPROPION HYDROCHLORIDE 100 MG: 100 TABLET, FILM COATED ORAL at 13:44

## 2018-12-07 RX ADMIN — MELATONIN TAB 3 MG 3 MG: 3 TAB at 22:47

## 2018-12-07 RX ADMIN — METHOHEXITAL SODIUM 60 MG: 500 INJECTION, POWDER, LYOPHILIZED, FOR SOLUTION INTRAMUSCULAR; INTRAVENOUS; RECTAL at 11:16

## 2018-12-07 RX ADMIN — CLONAZEPAM 0.5 MG: 0.5 TABLET ORAL at 13:44

## 2018-12-07 RX ADMIN — LEVOTHYROXINE SODIUM 25 MCG: 25 TABLET ORAL at 12:17

## 2018-12-07 RX ADMIN — Medication 60 MG: at 11:16

## 2018-12-07 RX ADMIN — Medication 500 MG: at 13:44

## 2018-12-07 ASSESSMENT — ACTIVITIES OF DAILY LIVING (ADL)
ORAL_HYGIENE: INDEPENDENT
GROOMING: INDEPENDENT
ORAL_HYGIENE: PROMPTS
DRESS: SCRUBS (BEHAVIORAL HEALTH)
HYGIENE/GROOMING: PROMPTS
DRESS: PROMPTS

## 2018-12-07 ASSESSMENT — LIFESTYLE VARIABLES: TOBACCO_USE: 1

## 2018-12-07 NOTE — PROGRESS NOTES
Returned from ECT earlier.  Denies pain, nausea or dizziness.  Took water and medication, oriented X3.  Declined lunch, visited with family.

## 2018-12-07 NOTE — PROCEDURES
Procedure/Surgery Information   [facility], [place]    Bedside Procedure Note  Date of Service (when I performed the procedure): 12/07/2018    Gwen Cash is a 53 year old female patient.  1. Major depressive disorder, remission status unspecified, unspecified whether recurrent      Past Medical History:   Diagnosis Date     Calculus of right kidney 1997    had hydronephrosis & treated for mild corinna at that time, calcium phosphate stone s/p lithotripsy      Chronic depressive personality disorder      DUB (dysfunctional uterine bleeding) 2004    work up including tsh ,labs, ecc , gyn eval normal      H/O echocardiogram 11/2011    at [facility] was normal , no valvular issues, done for functional benign murmur noted in past      History of Papanicolaou smear of cervix 9/14/01, 6/21/04    NIL     OCD (obsessive compulsive disorder) 3/4/2017     Panic disorder without agoraphobia 3/4/2017     Tobacco use disorder 3/4/2017     Temp: 98.6  F (37  C) Temp src: Oral BP: 140/72 Pulse: 83              Procedures     Melo Wang     [facility], [place]   ECT Procedure Note   12/07/2018    Gwen Cash [id number]   53 year old 1965     Patient Status: Inpatient    Is this the first in a series of 12 treatments?  Yes    History and Physical: Reviewed in medical record    Consent: Informed consent was signed by: patient    Date Consent Signed: 12/7/18      Allergies   Allergen Reactions     Metronidazole Unknown     Abstracted data , patient cannot remember      Penicillins Unknown     Abstracted data , patient cannot remember        Weight:  97 lbs 3.2 oz              Indications for ECT:   Medications ineffective         Clinical Narrative:   Patient was admitted with severe depression and OCD.   She's starting ECT for depression.  NPO after 2400.  Alert and Oriented x 3.  Mood remains depressed and anxious.            Diagnosis:   Major depression          Pause for the Cause:     Right patient Yes   Right procedure/laterality settings: Yes          Intra-Procedure Documentation:       ECT #: 1   Treatment number this series: 1   Total treatment number: 1     Type of ECT:  Right, unilateral ultrabrief    ECT Medications:    Brevital: 60mg  Succinyl Choline: 60mg    ECT Strip Summary:   Energy Level: 30 percent  Motor Seizure Duration: 38 seconds  EEG Seizure Duration:  59 seconds    Complications: No    Plan:   Next ECT 12/10/18    Melo Wang MD

## 2018-12-07 NOTE — PROGRESS NOTES
Pt discharge from recovery room via wheelchair back to unit station 10N with staff at this time at 1209.  Report given to HERNANDO May on station 20.

## 2018-12-07 NOTE — PROGRESS NOTES
"Pt has been isolative to her room.  She did have her first ECT treatment today.  Pt reports that she is \"scared\" but said it went ok.  Pt reports that her depression level is 8/10 and her anxiety level is 10/10 with 10 the most. Pt denies any SI or SIB thinking.     12/07/18 1455   Behavioral Health   Hallucinations denies / not responding to hallucinations   Thinking distractable   Orientation person: oriented;place: oriented;date: oriented   Insight poor   Judgement impaired   Eye Contact at examiner   Affect blunted, flat;sad;tense   Mood depressed;anxious   Physical Appearance/Attire attire appropriate to age and situation   Hygiene neglected grooming - unclean body, hair, teeth;body odor   Suicidality (denies SI)   1. Wish to be Dead No   2. Non-Specific Active Suicidal Thoughts  No   Self Injury (denies SIB thinking)   Elopement (none observed)   Activity isolative;withdrawn  (had ECT)   Speech clear;coherent   Psychomotor / Gait balanced;steady   Activities of Daily Living   Hygiene/Grooming independent   Oral Hygiene independent   Dress scrubs (behavioral health)   Room Organization independent     "

## 2018-12-07 NOTE — PROGRESS NOTES
"[facility], [place]   Psychiatric Progress Note        Interim History:   Reason for Hospitalization:Gwen is a 53 year old female with a history of depression, anxiety, OCD who is admitted on a voluntary basis for worsening depressive moods, lack of oral intake, and worsened obsessive/compulsive symptoms.     Subjective: \" I want to get better, and want to be on the right medications\"     As per today's interview: The patient was seen in her room, less tearful compared to prior days, however continuously ruminative and obsessive on various things. Open to starting ECT tomorrow.          Medications:       acetylcysteine  500 mg Oral Daily     buPROPion  100 mg Oral Daily     clonazePAM  0.5 mg Oral Daily     influenza vaccine adult (product based on age)  0.5 mL Intramuscular Prior to discharge     levothyroxine  25 mcg Oral QAM AC     PAXIL  40 mg Oral Daily with lunch          Allergies:     Allergies   Allergen Reactions     Metronidazole Unknown     Abstracted data , patient cannot remember      Penicillins Unknown     Abstracted data , patient cannot remember           Labs:     No results found for this or any previous visit (from the past 48 hour(s)).       Psychiatric Examination:   /72  Pulse 83  Temp 98.6  F (37  C) (Oral)  Resp 16  Ht 1.575 m (5' 2\")  Wt 44.1 kg (97 lb 3.2 oz)  SpO2 99%  BMI 17.78 kg/m2  Weight is 97 lbs 3.2 oz  Body mass index is 17.78 kg/(m^2).    Appearance:  female. Thin, malnourished appearing. Pale skin. Appears fatigued. Unkept/disheveled appearance. Malodorous. No acute distress.   Attitude:  Attentive, cooperative, however anxious and tends to ask the same questions repeatedly.   Eye Contact:  fair  Mood:  anxious   Affect:  Improved range today.   Speech:  clear, coherent  Language: fluent and intact in English  Psychomotor, Gait, Musculoskeletal:  no evidence of tardive dyskinesia, dystonia, or tics  Throught Process:  " logical  Associations:  no loose associations  Thought Content:  no evidence of suicidal ideation or homicidal ideation, no evidence of psychotic thought, no auditory hallucinations present and no visual hallucinations present  Insight:  partial  Judgement:  Fair-Poor   Oriented to:  time, person, and place  Attention Span and Concentration:  fair/limited   Recent and Remote Memory:  fair  Fund of Knowledge:  appropriate      Assessment & Plan       Principal Diagnosis:   Major Depressive Disorder, recurrent, severe  OCD  Illness Anxiety Disorder  Obsessive Compulsive Personality Disorder   Subclinical Hypothyroidism     Assessment:   (Initial Assessment): Patient appears to be experiencing worsening neurovegetative symptoms of depression, particularly apathy, avolition/amotivation, anergy, along with a serious weight loss within a short amount of time (4-5 months). Her weight loss appears to be related to her depression, but malignant etiology should be ruled out at some point. She also has worsened compulsions/obsessions characteristic of OCD. We discussed the need to initiate medications to help augment her mood, sleep and weight gain. Discussed starting Risperdal to help with OCD and depression augmentation, and Remeron for additional sleep/appetite improvement. Will work with medicine PA to assess for refeeding issues as time goes on, along with consult with nutritionist for recommendations on improved caloric intake.     Update 11/19: Improved oral intake (claims to be eating lunch and dinner most days, and drinking boost during meals). Had a long discussion about medications, during which she has many questions and kept on ruminating over the same issue over and over. Spoke to pt's daughter about plan. Pt's Mg and electrolytes appear to have normalized. Pt refusing Risperdal. Discussed starting Wellbutrin for her amotivational, apathetic symptoms, along with the added benefit of smoking cessation. Discussed  ECT, but patient refused at this time.     ------    Update 11/27: After an initial period of some improved oral intake and some increased behavior drive, she seems to have worsened. In there interim, Wellbutrin was discontinued, Paxil was increased. She has been mostly isolative in her room, tearful, ruminative about various worries, and decreased oral intake. I discussed adding back on a small dose of Wellbutrin since it seemed to improve her energy and volitional drive (as per patient). I will also add on N-acetylcysteine capsules to provide adjuctive treatment for her OCD. I continued to discuss ECT for her treatment resistant depression and OCD.     Update 11/29: Patient has been eating and drink minimally. Continues to be fearful for medications, and obsession about the dose/side effects, etc. Discussed the benefit of ECT, which she was half heartedly willing to consider for now, thus I will order the ECT consult to get the plan started. Collateral info from sister revealed that patient is chronically lower BMI throughout her life, and her weight loss was more in the range of 10-15 lbs instead of 30 lbs.     -----------------    Update 12/6: Some minimal food intake today. Compliant with medications. Willing to start ECT tomorrow.      Plan:  1.) Medication Plan:  - Wellbutrin 100 mg  - for neurovegetative depressive symptoms   -  N-acetylcysteine 500 mg capsules - for adjunctive treatment for OCD    - Remeron 7.5 mg HS  - Paxil 40 mg - can be increased by 10 mg a week up to 60 mg .   - Klonopin 0.5 mg Daily  - ECT (starting this Friday)- for treatment resistant depression and OCD.      2.) Psychosocial Plan:  - Work with [facility] to help arrange appropriate follow up care  - Nutritionist Consult team has been following up      Legal:  Voluntary (but hold-able)      Disposition:  Unclear, pending stabilization      Safety Assessment:   Checks: Status 15  Precautions: None  Pt has not required locked seclusion or  restraints in the past 24 hours to maintain safety, please refer to RN documentation for further details.       The risks, benefits, alternatives and side effects have been discussed and are understood by the patient and other caregivers.         Attestation:  Patient has been seen and evaluated by me,  Wei Powell MD

## 2018-12-07 NOTE — ANESTHESIA PREPROCEDURE EVALUATION
Anesthesia Pre-Procedure Evaluation    Patient: Gwen Cash   MRN:     [id number] Gender:   female   Age:    53 year old :      1965        Preoperative Diagnosis: * No surgery found *        Past Medical History:   Diagnosis Date     Calculus of right kidney     had hydronephrosis & treated for mild corinna at that time, calcium phosphate stone s/p lithotripsy      Chronic depressive personality disorder      DUB (dysfunctional uterine bleeding)     work up including tsh ,labs, ecc , gyn eval normal      H/O echocardiogram 2011    at health [facility] was normal , no valvular issues, done for functional benign murmur noted in past      History of Papanicolaou smear of cervix 01, 04    NIL     OCD (obsessive compulsive disorder) 3/4/2017     Panic disorder without agoraphobia 3/4/2017     Tobacco use disorder 3/4/2017      Past Surgical History:   Procedure Laterality Date      SECTION      x 3     LITHOTRIPSY            Anesthesia Evaluation     . Pt has had prior anesthetic. Type: General and Regional    No history of anesthetic complications          ROS/MED HX    ENT/Pulmonary:     (+)tobacco use, , . .    Neurologic:  - neg neurologic ROS     Cardiovascular:  - neg cardiovascular ROS   (+) ----. : . . . :. . Previous cardiac testing date:results:date: results:ECG reviewed date: results:NSR. date: results:          METS/Exercise Tolerance:  >4 METS   Hematologic:  - neg hematologic  ROS       Musculoskeletal:  - neg musculoskeletal ROS       GI/Hepatic:  - neg GI/hepatic ROS       Renal/Genitourinary:     (+) Nephrolithiasis ,       Endo:  - neg endo ROS       Psychiatric: Comment: Severe anxiety and OCD.    (+) psychiatric history anxiety and other (comment)      Infectious Disease:  - neg infectious disease ROS       Malignancy:         Other:                         PHYSICAL EXAM:   Mental Status/Neuro:    Airway: Facies: Feasible  Mallampati: II  Mouth/Opening:  "Full  TM distance: > 6 cm  Neck ROM: Full   Respiratory: Auscultation: CTAB     Resp. Rate: Normal     Resp. Effort: Normal      CV: Rhythm: Regular  Rate: Age appropriate  Heart: Normal Sounds   Comments:                    Lab Results   Component Value Date    WBC 7.8 11/16/2018    HGB 15.9 (H) 11/16/2018    HCT 46.3 11/16/2018     11/16/2018     11/29/2018    POTASSIUM 4.4 11/29/2018    CHLORIDE 102 11/29/2018    CO2 30 11/29/2018    BUN 20 11/29/2018    CR 0.74 11/29/2018    GLC 90 11/29/2018    JOSE 9.6 11/29/2018    PHOS 3.4 11/29/2018    MAG 2.2 11/29/2018    ALBUMIN 4.9 11/16/2018    PROTTOTAL 8.3 11/16/2018    ALT 17 11/16/2018    AST 11 11/16/2018    ALKPHOS 74 11/16/2018    BILITOTAL 0.4 11/16/2018    TSH 5.89 (H) 11/16/2018    T4 0.93 11/16/2018    HCG Negative 11/16/2018       Preop Vitals  BP Readings from Last 3 Encounters:   12/07/18 125/73   03/03/17 126/84    Pulse Readings from Last 3 Encounters:   12/07/18 71   03/03/17 99      Resp Readings from Last 3 Encounters:   12/05/18 16   03/03/17 16    SpO2 Readings from Last 3 Encounters:   12/07/18 99%   03/03/17 96%      Temp Readings from Last 1 Encounters:   12/07/18 36.7  C (98  F) (Oral)    Ht Readings from Last 1 Encounters:   11/16/18 1.575 m (5' 2\")      Wt Readings from Last 1 Encounters:   12/06/18 44.1 kg (97 lb 3.2 oz)    Estimated body mass index is 17.78 kg/(m^2) as calculated from the following:    Height as of this encounter: 1.575 m (5' 2\").    Weight as of this encounter: 44.1 kg (97 lb 3.2 oz).     LDA:  Peripheral IV 12/07/18 Right Lower forearm (Active)   Number of days:0            Assessment:   ASA SCORE: 2       Documentation: H&P complete; Preop Testing complete; Consents complete   Proceeding: Proceed without further delay  Tobacco Use:  NO Active use of Tobacco/UNKNOWN Tobacco use status     Plan:   Anes. Type:  General   Pre-Induction: Midazolam IV; Acetaminophen PO   Induction:  IV (Standard)    "   Access/Monitoring: PIV   Maintenance: Balanced   Emergence: Procedure Site   Logistics: Same Day Surgery     Postop Pain/Sedation Strategy:  Standard-Options: Opioids PRN     PONV Management:  Adult Risk Factors: Female, Non-Smoker, Postop Opioids     Comments for Plan/Consent:  Mask general anesthesia.                          Xi Sierra MD

## 2018-12-07 NOTE — PROGRESS NOTES
Pt is calm, but tense. She affect is anxious. She denies psychotic symptoms, SI, & SIB. Pt ate about 75% of her dinner meal. She is nervous about ECT tomorrow. She asked several times about the safety of the procedure and was reassured by staff that the procedure is safe.       12/06/18 2100   Behavioral Health   Hallucinations denies / not responding to hallucinations   Thinking distractable   Orientation person: oriented;place: oriented;date: oriented;time: oriented   Memory baseline memory   Insight poor   Judgement impaired   Eye Contact at examiner   Affect tense   Mood anxious   Physical Appearance/Attire attire appropriate to age and situation   Hygiene neglected grooming - unclean body, hair, teeth   Suicidality other (see comments)  (denies )   1. Wish to be Dead No   2. Non-Specific Active Suicidal Thoughts  No   3. Active Sucidal Ideation with any Methods (Not Plan) Without Intent to Act  No   4. Active Suicidal Ideation with Some Intent to Act, Without Specific Plan  No   5. Active Suicidal Ideation with Specific Plan and Intent  No   Activity withdrawn;isolative   Speech coherent;clear   Psychomotor / Gait steady;balanced   Activities of Daily Living   Hygiene/Grooming prompts   Oral Hygiene prompts   Dress prompts   Room Organization prompts

## 2018-12-07 NOTE — ANESTHESIA POSTPROCEDURE EVALUATION
Anesthesia POST Procedure Evaluation    Patient: Gwen Cash   MRN:     [id number] Gender:   female   Age:    53 year old :      1965        Preoperative Diagnosis: * No pre-op diagnosis entered *   * No procedures listed *   Postop Comments: No value filed.       Anesthesia Type:  General    Reportable Event: NO     PAIN: Uncomplicated   Sign Out status: Comfortable, Well controlled pain     PONV: No PONV   Sign Out status:  No Nausea or Vomiting     Neuro/Psych: Uneventful perioperative course   Sign Out Status: Preoperative baseline; Age appropriate mentation     Airway/Resp.: Uneventful perioperative course   Sign Out Status: Non labored breathing, age appropriate RR; Resp. Status within EXPECTED Parameters     CV: Uneventful perioperative course   Sign Out status: Appropriate BP and perfusion indices; Appropriate HR/Rhythm     Disposition:   Sign Out in:  PACU  Disposition:  Phase II; Home  Recovery Course: Uneventful  Follow-Up: Not required           Last Anesthesia Record Vitals:  CRNA VITALS  2018 1055 - 2018 1155      2018             Pulse: 82    Ht Rate: 84    SpO2: 100 %    Resp Rate (set): 8          Last PACU/Preop Vitals:  Vitals:    18 0934 18 1127 18 1144   BP: 125/73 136/66 144/76   Pulse: 71 96 95   Resp:  24 16   Temp: 36.7  C (98  F) 36.6  C (97.8  F)    SpO2: 99% 98% 95%         Electronically Signed By: Xi Sierra MD, 2018, 11:55 AM

## 2018-12-07 NOTE — PROGRESS NOTES
CLINICAL NUTRITION SERVICES - REASSESSMENT NOTE     Nutrition Prescription    RECOMMENDATIONS FOR MDs/PROVIDERS TO ORDER:  Please continue to encouraged po intakes and 3 Boost/day.   Recommend discuss trialing an appetite stimulant with patient given continuation of poor appetite.    Malnutrition Status:    Non-severe malnutrition in the context of environmental or social circumstances    Recommendations already ordered by Registered Dietitian (RD):  Continue Boost TID with meals as ordered  Continue Magic Cup with lunch and gelatein with dinner    Future/Additional Recommendations:  Monitor po intakes and wt trends. Consider need to add/discontinue supplement or adjust flavors pending trends and pt preferences.    Continue to encourage Boost 3/day.      EVALUATION OF THE PROGRESS TOWARD GOALS   Diet: Regular  Snack/Supplements: Magic cup (moose) with lunch; Gelatein (cherry) with dinner; Boost Plus (strawberry with breakfast and dinner, moose with lunch)  Intake: Noted to eat 75% of dinner yesterday and 50% of lunch, 100% of dinner. Continues to decline breakfast (this is normal for pt). Per notes, continues to c/o poor appetite but still trying to eat despite this. She reports eating ~75% of lunch and most of dinner. Drinking 2 Boost/day + 1 Magic Cup + 1 gelatein supplement. Estimate intakes of ~2250 kcal, 95 g protein/day based on intakes above and review of [employer] (food ordering system). C/o some constipation, hard stools.     NEW FINDINGS   - Starting ECT today, which pt is nervous about  - Wt: Wt stable ~97 lb, overall up 2 lb since admit. Remains in underweight BMI category. Pt had reported UBW of 125 lb. Per previous assessment, She states that in Jan/Feb, she went to the doctor and was 119 lbs. This is when she realized she was losing weight. She feels weight loss has been more rapid in the past few months. No recent wt history available in CareEverywhere, making it difficult to quantify wt loss. Per  pt's report, wt loss of at least 22 lb (18%) over the past ~11 months.  She continues to be concerned about her wt loss and current struggle with gaining wt.  - Labs: Reviewed most recent 11/29  - Meds: Reviewed    MALNUTRITION  % Intake: Decreased intake does not meet criteria  % Weight Loss: Up to 20% in 1 year (non-severe)  Subcutaneous Fat Loss: Facial region: Mild-moderate  Muscle Loss: Temporal, Facial & jaw region, Scapular bone and Thoracic region (clavicle, acromium bone, deltoid, trapezius, pectoral): Moderate  Fluid Accumulation/Edema: None noted  Malnutrition Diagnosis: Non-severe malnutrition in the context of environmental or social circumstances    Previous Goals   1. Patient to consume % of nutritionally adequate meal trays TID, or the equivalent with supplements/snacks.  Evaluation: Met     2. BMI to progress towards Normal BMI status (>18.5 kg/m2).  Evaluation: Not met    Previous Nutrition Diagnosis  Inadequate oral intake related to suboptimal appetite 2/2 depressed mood as evidenced by pt skipping many meals x2 days, overall reduced intakes over the past several months, and wt loss over the past ~11 months.    Evaluation: Resolved    CURRENT NUTRITION DIAGNOSIS  Increased nutrient needs (protein-energy) related to wt loss PTA and inability to regain significant wt during admission despite intakes as evidenced by wt loss of ~18% over the past 11 months and estimated current intakes of ~2250 kcal, 95 g protein (51 kcal/kg, 2.2 g protein/kg).    INTERVENTIONS  Implementation  Medical food supplement therapy - continue sending Boost Plus TID with meals. Encouraged pt to increase her Boost intakes to 3 per day. Continue Magic cup and Gelatein. Encouraged adequate fluid intakes and incorporating foods higher in fiber.  Discussed with RN - planned for pt to go to ECT in am on M, W, F. Notified RN of encouraged pt to increase Boost to 3 per day.    Goals  1. Patient to consume % of  nutritionally adequate meal trays TID, or the equivalent with supplements/snacks.     2. BMI to progress towards Normal BMI status (>18.5 kg/m2).    3. Patient to drink 3 Boost/day.    Monitoring/Evaluation  Progress toward goals will be monitored and evaluated per protocol.    Stephanie Trejo RD, LD  Unit pgr: [phone]

## 2018-12-08 PROCEDURE — 12400007 ZZH R&B MH INTERMEDIATE UMMC

## 2018-12-08 PROCEDURE — 25000132 ZZH RX MED GY IP 250 OP 250 PS 637: Performed by: PSYCHIATRY & NEUROLOGY

## 2018-12-08 PROCEDURE — 25000132 ZZH RX MED GY IP 250 OP 250 PS 637: Performed by: EMERGENCY MEDICINE

## 2018-12-08 PROCEDURE — 25000132 ZZH RX MED GY IP 250 OP 250 PS 637: Performed by: PHYSICIAN ASSISTANT

## 2018-12-08 PROCEDURE — 25000132 ZZH RX MED GY IP 250 OP 250 PS 637

## 2018-12-08 RX ADMIN — CLONAZEPAM 0.5 MG: 0.5 TABLET ORAL at 14:09

## 2018-12-08 RX ADMIN — BUPROPION HYDROCHLORIDE 100 MG: 100 TABLET, FILM COATED ORAL at 14:09

## 2018-12-08 RX ADMIN — MELATONIN TAB 3 MG 3 MG: 3 TAB at 22:58

## 2018-12-08 RX ADMIN — ACETAMINOPHEN 325 MG: 325 TABLET, FILM COATED ORAL at 11:52

## 2018-12-08 RX ADMIN — Medication 40 MG: at 14:10

## 2018-12-08 RX ADMIN — Medication 500 MG: at 14:09

## 2018-12-08 RX ADMIN — LEVOTHYROXINE SODIUM 25 MCG: 25 TABLET ORAL at 09:49

## 2018-12-08 ASSESSMENT — ACTIVITIES OF DAILY LIVING (ADL)
GROOMING: PROMPTS
DRESS: STREET CLOTHES;INDEPENDENT
ORAL_HYGIENE: PROMPTS

## 2018-12-08 NOTE — PROGRESS NOTES
Patient is calm and cooperative but still anxious about continuing ECT. Depression is rated at 7/10 and anxiety is rated at 8/10. Patient is frustrated that she cannot stop compulsive cleaning after eating. She acknowledges that she needs to shower and take care of herself more but wants family help so she does not fall in shower. Patient has little appetite and does not ever feel the urge to eat. Patient has been sleeping long nights with some tossing and turning in the middle of the night. Patient denies SI, SIB but has consistent anxiety and depression. Her goal is to get better and gain some weight when she leaves. As of now she has been isolative and keeping to herself.

## 2018-12-08 NOTE — PROGRESS NOTES
Pt had a tense and anxious shift. She reported depression at a 7-8 and anxiety at a 7-8 (scale 1-10). Pt denied racing thoughts, but spoke of many fears regarding the safety of ECT and staff's ability to check on her frequently during this post procedural period. Pt denies psychotic symptoms, SI or SIB. She states she feels hopeful. She ate 75% of her dinner meal, was visible in the milieu (withdrawn from peer interaction). She visited with her family, this visit went well. She requests for the night staff to do more frequent safety round checks on her this evening to ensure she is breathing and safe throughout the night since she had her first ECT procedure today.         12/07/18 2043   Behavioral Health   Hallucinations denies / not responding to hallucinations   Thinking distractable   Orientation person: oriented;place: oriented;date: oriented;time: oriented   Memory baseline memory   Insight poor   Judgement impaired   Eye Contact at examiner   Affect blunted, flat;sad;tense   Mood depressed;anxious   Physical Appearance/Attire attire appropriate to age and situation   Hygiene neglected grooming - unclean body, hair, teeth   Suicidality other (see comments)  (denies )   1. Wish to be Dead No   2. Non-Specific Active Suicidal Thoughts  No   Activity isolative;withdrawn   Speech clear;coherent   Psychomotor / Gait balanced;steady   Activities of Daily Living   Hygiene/Grooming prompts   Oral Hygiene prompts   Dress prompts   Room Organization independent

## 2018-12-09 PROCEDURE — 25000132 ZZH RX MED GY IP 250 OP 250 PS 637: Performed by: PHYSICIAN ASSISTANT

## 2018-12-09 PROCEDURE — 25000132 ZZH RX MED GY IP 250 OP 250 PS 637: Performed by: PSYCHIATRY & NEUROLOGY

## 2018-12-09 PROCEDURE — 12400007 ZZH R&B MH INTERMEDIATE UMMC

## 2018-12-09 PROCEDURE — 25000132 ZZH RX MED GY IP 250 OP 250 PS 637: Performed by: EMERGENCY MEDICINE

## 2018-12-09 PROCEDURE — 25000132 ZZH RX MED GY IP 250 OP 250 PS 637

## 2018-12-09 RX ADMIN — BUPROPION HYDROCHLORIDE 100 MG: 100 TABLET, FILM COATED ORAL at 14:18

## 2018-12-09 RX ADMIN — CLONAZEPAM 0.5 MG: 0.5 TABLET ORAL at 14:18

## 2018-12-09 RX ADMIN — Medication 500 MG: at 14:18

## 2018-12-09 RX ADMIN — LEVOTHYROXINE SODIUM 25 MCG: 25 TABLET ORAL at 09:14

## 2018-12-09 RX ADMIN — Medication 40 MG: at 14:17

## 2018-12-09 RX ADMIN — MELATONIN TAB 3 MG 3 MG: 3 TAB at 22:54

## 2018-12-09 ASSESSMENT — ACTIVITIES OF DAILY LIVING (ADL)
ORAL_HYGIENE: PROMPTS
DRESS: STREET CLOTHES
DRESS: SCRUBS (BEHAVIORAL HEALTH)
HYGIENE/GROOMING: PROMPTS

## 2018-12-09 ASSESSMENT — MIFFLIN-ST. JEOR: SCORE: 999.14

## 2018-12-09 NOTE — PLAN OF CARE
Cognitive Impairment (Obsessive-Compulsive Signs/Symptoms) (Adult)  Improved Focus and Information Retention (Obsessive-Compulsive Signs/Symptoms)  12/9/2018 1618 - No Change   Patient continues to perseverate on ECT. Asks writer multiple times regarding outcomes, side effects, number of treatments, time of appointments. Patient reported soreness in her calves, writer explained this could be due to ECT. Patient is now concerned that she has a blood clot. Writer examined legs, no swelling, redness, or warmth noted.   Emotion/Mood Impairment (Obsessive-Compulsive Signs/Symptoms) (Adult)  Improved Mood Symptoms (Obsessive-Compulsive Signs/Symptoms)  12/9/2018 1618 - Improving   Patient's mood appears to be improving. She is waking at her normal time in the morning, sleep has improved, she has been eating about 75% of 2 meals per day. Patient reports her mood and depression have improved.   Sleep Impairment (Obsessive-Compulsive Signs/Symptoms) (Adult)  Improved Sleep Hygiene (Obsessive-Compulsive Signs/Symptoms)  Suicidal Behavior is Absent or Managed  Continues to deny suicidal ideation, plan, intent.

## 2018-12-09 NOTE — PROGRESS NOTES
"Pt was visible in the milieu for about 50% of the shift, she was observed watching tv in the lounge with peers or sitting in the dining room. Her overall affect was blunted/flat and sad, her mood was depressed and anxious. Pt endorsed feelings of depression and anxiety, she rated depression 6-7/10 and anxiety 7-8/10. Pt denied SI, SIB, and hallucinations. Pt ate all of her meals but stated \"I still don't have an appetite, but I eat anyways just to put food in me\". Pt continues to neglect hygiene cares, writer asked if she would take a shower today or tomorrow to which pt replied \"I want to wait for my daughter and sister, they said they would help me\". Writer asked if she could call them to make a plan and she said \"I can't tonight, but maybe tomorrow I will call them\". Writer offered to help her call or to assist her with her shower but pt declined. Overall pt had an ok shift, there were no major concerns.        12/08/18 8413   Behavioral Health   Hallucinations denies / not responding to hallucinations   Thinking distractable   Orientation person: oriented;place: oriented;date: oriented   Insight poor   Judgement impaired   Eye Contact at examiner   Affect blunted, flat;sad   Mood depressed;anxious   Physical Appearance/Attire attire appropriate to age and situation   Hygiene neglected grooming - unclean body, hair, teeth;body odor   Suicidality other (see comments)  (Denies)   1. Wish to be Dead No   2. Non-Specific Active Suicidal Thoughts  No   Self Injury (Denies, none observed)   Elopement (No statements made, none observed)   Activity isolative;withdrawn   Speech clear;coherent   Psychomotor / Gait balanced;steady   Activities of Daily Living   Hygiene/Grooming prompts  (neglects)   Oral Hygiene prompts  (neglects)   Dress street clothes;independent   Room Organization independent     "

## 2018-12-10 ENCOUNTER — ANESTHESIA EVENT (OUTPATIENT)
Dept: BEHAVIORAL HEALTH | Facility: CLINIC | Age: 53
End: 2018-12-10

## 2018-12-10 ENCOUNTER — ANESTHESIA (OUTPATIENT)
Dept: BEHAVIORAL HEALTH | Facility: CLINIC | Age: 53
End: 2018-12-10

## 2018-12-10 PROCEDURE — 12400007 ZZH R&B MH INTERMEDIATE UMMC

## 2018-12-10 PROCEDURE — 25000132 ZZH RX MED GY IP 250 OP 250 PS 637

## 2018-12-10 PROCEDURE — 25000125 ZZHC RX 250: Performed by: PSYCHIATRY & NEUROLOGY

## 2018-12-10 PROCEDURE — 99207 ZZC CDG-MDM COMPONENT: MEETS MODERATE - UP CODED: CPT | Performed by: PSYCHIATRY & NEUROLOGY

## 2018-12-10 PROCEDURE — 90870 ELECTROCONVULSIVE THERAPY: CPT

## 2018-12-10 PROCEDURE — 25000132 ZZH RX MED GY IP 250 OP 250 PS 637: Performed by: PSYCHIATRY & NEUROLOGY

## 2018-12-10 PROCEDURE — 25000125 ZZHC RX 250: Performed by: ANESTHESIOLOGY

## 2018-12-10 PROCEDURE — 25000132 ZZH RX MED GY IP 250 OP 250 PS 637: Performed by: EMERGENCY MEDICINE

## 2018-12-10 PROCEDURE — 25000132 ZZH RX MED GY IP 250 OP 250 PS 637: Performed by: PHYSICIAN ASSISTANT

## 2018-12-10 PROCEDURE — 99232 SBSQ HOSP IP/OBS MODERATE 35: CPT | Mod: 25 | Performed by: PSYCHIATRY & NEUROLOGY

## 2018-12-10 PROCEDURE — 25000128 H RX IP 250 OP 636: Performed by: ANESTHESIOLOGY

## 2018-12-10 RX ORDER — ACETAMINOPHEN 325 MG/1
650 TABLET ORAL EVERY 4 HOURS PRN
Status: DISCONTINUED | OUTPATIENT
Start: 2018-12-10 | End: 2019-01-11 | Stop reason: HOSPADM

## 2018-12-10 RX ORDER — LIDOCAINE HYDROCHLORIDE 20 MG/ML
40 INJECTION, SOLUTION EPIDURAL; INFILTRATION; INTRACAUDAL; PERINEURAL
Status: COMPLETED | OUTPATIENT
Start: 2018-12-10 | End: 2018-12-10

## 2018-12-10 RX ADMIN — Medication 60 MG: at 09:08

## 2018-12-10 RX ADMIN — BUPROPION HYDROCHLORIDE 100 MG: 100 TABLET, FILM COATED ORAL at 15:02

## 2018-12-10 RX ADMIN — Medication 40 MG: at 15:02

## 2018-12-10 RX ADMIN — LEVOTHYROXINE SODIUM 25 MCG: 25 TABLET ORAL at 10:19

## 2018-12-10 RX ADMIN — CLONAZEPAM 0.5 MG: 0.5 TABLET ORAL at 15:02

## 2018-12-10 RX ADMIN — MELATONIN TAB 3 MG 3 MG: 3 TAB at 22:52

## 2018-12-10 RX ADMIN — LIDOCAINE HYDROCHLORIDE 40 MG: 20 INJECTION, SOLUTION EPIDURAL; INFILTRATION; INTRACAUDAL; PERINEURAL at 09:10

## 2018-12-10 RX ADMIN — Medication 500 MG: at 15:02

## 2018-12-10 RX ADMIN — METHOHEXITAL SODIUM 60 MG: 500 INJECTION, POWDER, LYOPHILIZED, FOR SOLUTION INTRAMUSCULAR; INTRAVENOUS; RECTAL at 09:08

## 2018-12-10 ASSESSMENT — ACTIVITIES OF DAILY LIVING (ADL)
LAUNDRY: WITH SUPERVISION
DRESS: INDEPENDENT
HYGIENE/GROOMING: PROMPTS
ORAL_HYGIENE: PROMPTS

## 2018-12-10 ASSESSMENT — LIFESTYLE VARIABLES: TOBACCO_USE: 1

## 2018-12-10 NOTE — PROGRESS NOTES
"Pt had ECT#2 this morning. Three family members visited before and after. Pt was smiling while she talked to them after the treatment. Pt said that she is hopeful; \"of course I have to be\". Pt is feeling some anxiety but not depression. Pt denies SI and SIB thoughts. Pt continues to have very poor hygiene and has body odor. Appetite is fair.     12/10/18 1127   Behavioral Health   Hallucinations denies / not responding to hallucinations   Thinking (adequate)   Orientation person: oriented;place: oriented;date: oriented;time: oriented   Memory baseline memory   Insight poor   Judgement impaired   Eye Contact at examiner   Affect (mid-range)   Mood anxious   Physical Appearance/Attire untidy;disheveled;appears stated age   Hygiene neglected grooming - unclean body, hair, teeth;body odor   Suicidality (denies)   1. Wish to be Dead No   2. Non-Specific Active Suicidal Thoughts  No   Self Injury (denies)   Elopement (WDL) WDL   Activity withdrawn   Speech coherent;clear   Medication Sensitivity no stated side effects;no observed side effects   Psychomotor / Gait balanced;steady   Coping/Psychosocial   Verbalized Emotional State hopefulness   Activities of Daily Living   Hygiene/Grooming prompts   Oral Hygiene prompts   Dress independent   Laundry with supervision   Room Organization independent     "

## 2018-12-10 NOTE — PROGRESS NOTES
[facility], [place]   Psychiatric Progress Note      Impression:   Gwen Cash is a 53 year old female admitted for severe depression and weight loss of 30 lbs. We started ECT on 12/7 to improve severe anxiety, OCD.  We are adjusting medications to target mood.  We are also working with the patient on therapeutic skill building.             Diagnoses and Plan:       Principal Diagnosis: MDD, recurrent, severe without psychotic features. OCD  Unit: station 10  Attending: Santana  Medications: risks/benefits discussed with patient-    - Paxil  40mg daily for compulsive behaviors, anxiety, depression  - Wellbutrin 100mg AM   -klonopin 0.5mg HS   -synthroid 25 mcg daily  - N-Acetyl cysteine 500mg daily  Laboratory/Imaging:  - Upreg neg, CBC wnl and BMP WNL  Consults:  -  Internal medicine, to evaluate synthroid dose.  Patient will be treated in therapeutic milieu with appropriate individual and group therapies as described.    ECT #2 Today.    Medical diagnoses to be addressed this admission:   Weight loss of 30 lbs  Hypothyroidism      Legal Status: Voluntary      Safety Assessment:   Checks: Status 15  Precautions: Suicide  Pt has not required locked seclusion or restraints in the past 24 hours to maintain safety, please refer to RN documentation for further details.    The risks, benefits, alternatives and side effects have been discussed and are understood by the patient and other caregivers.     Target symptoms to stabilize: SI, depressed, neurovegetative symptoms, sleep issues and disordered eating  Target disposition: outpatient provider          Attestation:  Patient has been seen and evaluated by me,  Joe Dillon MD          Interim History:   The patient's care was discussed with the treatment team and chart notes were reviewed.    Pt received #2 ECT this morning. As with the #1 ECT, her family members came before ECT for emotional support and accompanied her to ECT suite.  "  Writer interviewed her after ECT. She has not taken shower since admitted, and writer asked why. She said she is very afraid that she might fall there, as she sometimes feels dizzy.   She has been told that her family can come and help her to shower, and she might do that, she says.   She says so far her anxiety level and feeling scared have not changed or improved.   She denies SI as she has been denying all along since admitted.   But her obsession with somatic symptoms and fear continues at the severe, same level that she is unable to function in everyday life.          The 10 point Review of Systems is negative other than noted in the HPI         Medications:       acetylcysteine  500 mg Oral Daily     buPROPion  100 mg Oral Daily     clonazePAM  0.5 mg Oral Daily     influenza vaccine adult (product based on age)  0.5 mL Intramuscular Prior to discharge     levothyroxine  25 mcg Oral QAM AC     PAXIL  40 mg Oral Daily with lunch             Allergies:     Allergies   Allergen Reactions     Metronidazole Unknown     Abstracted data , patient cannot remember      No Clinical Screening - See Comments Hives     Penicillins Unknown     Abstracted data , patient cannot remember      Phenylbutazones             Psychiatric Examination:   /69   Pulse 88   Temp 98.7  F (37.1  C) (Oral)   Resp 16   Ht 1.575 m (5' 2\")   Wt 44.6 kg (98 lb 4.8 oz)   SpO2 98%   BMI 17.98 kg/m    Weight is 98 lbs 4.8 oz  Body mass index is 17.98 kg/m .    Appearance:  awake, alert, dressed in hospital scrubs, appeared as age stated, cooperative, mild distress, poor personal hygiene and unkempt  Attitude:  cooperative  Eye Contact:  varies. sometimes she looks down on the floor  Mood:  anxious and depressed  Affect:  constricted mobility  Speech:  clear, coherent  Psychomotor Behavior:  no evidence of tardive dyskinesia, dystonia, or tics and intact station, gait and muscle tone  Thought Process:  perseverative, " obsessive  Associations:  no loose associations  Thought Content:  no evidence of suicidal ideation or homicidal ideation, no evidence of psychotic thought, no auditory hallucinations present, no visual hallucinations present and obsessions present  Insight:  limited  Judgment:  limited  Oriented to:  time, person, and place  Attention Span and Concentration:  limited  Recent and Remote Memory:  limited  Language: Able to name objects  Fund of Knowledge: appropriate  Muscle Strength and Tone: normal  Gait and Station: Normal         Labs:   No results found for this or any previous visit (from the past 24 hour(s)).

## 2018-12-10 NOTE — PROGRESS NOTES
Pt discharged from the recovery room via wheelchair back to station 20N  with staff at 0958 .  Report given to station Cira VILLAGOMEZ,.                 .

## 2018-12-10 NOTE — PROGRESS NOTES
Pt had a calm shift. Her two sons, daughter, and daughter in law came for a visit during visiting hours. Pt stated she is anxious about ECT tomorrow. Pt is completely neglecting body care. Is very untidy in appearance and has bad body odor and bad breath. Pt denies SI/SIB and AH/VH. Overall, very uneventful shift.      12/09/18 1924   Behavioral Health   Hallucinations denies / not responding to hallucinations   Thinking intact   Orientation person: oriented;place: oriented;date: oriented;time: oriented   Memory baseline memory   Insight poor   Judgement impaired   Eye Contact at examiner   Affect full range affect   Mood anxious   Physical Appearance/Attire untidy   Hygiene body odor;neglected grooming - unclean body, hair, teeth   Suicidality (Denies)   1. Wish to be Dead No   2. Non-Specific Active Suicidal Thoughts  No   Self Injury (Denies)   Elopement (Denies)   Activity (Active)   Speech clear;coherent   Medication Sensitivity no stated side effects;no observed side effects   Psychomotor / Gait balanced;steady   Activities of Daily Living   Hygiene/Grooming (Neglecting)   Oral Hygiene (Neglecting)   Dress scrubs (behavioral health)   Room Organization prompts

## 2018-12-10 NOTE — ANESTHESIA POSTPROCEDURE EVALUATION
Anesthesia POST Procedure Evaluation    Patient: Gwen Cash   MRN:     [id number] Gender:   female   Age:    53 year old :      1965        Preoperative Diagnosis: * No pre-op diagnosis entered *   * No procedures listed *   Postop Comments: No value filed.       Anesthesia Type:  General    Reportable Event: NO     PAIN: Uncomplicated   Sign Out status: Comfortable, Well controlled pain     PONV: No PONV   Sign Out status:  No Nausea or Vomiting     Neuro/Psych: Uneventful perioperative course   Sign Out Status: Preoperative baseline; Age appropriate mentation     Airway/Resp.: Uneventful perioperative course   Sign Out Status: Non labored breathing, age appropriate RR; Resp. Status within EXPECTED Parameters     CV: Uneventful perioperative course   Sign Out status: Appropriate BP and perfusion indices; Appropriate HR/Rhythm     Disposition:   Sign Out in:  PACU  Disposition:  Phase II; Home  Recovery Course: Uneventful  Follow-Up: Not required           Last Anesthesia Record Vitals:  CRNA VITALS  12/10/2018 0845 - 12/10/2018 0918      12/10/2018             Pulse:  98    Ht Rate:  99    SpO2:  100 %    Resp Rate (set):  8    EKG:  Sinus rhythm          Last PACU/Preop Vitals:  Vitals:    12/10/18 0800 12/10/18 0916 12/10/18 0932   BP:  142/75 138/72   Pulse:  94 87   Resp: 16     Temp: 37.2  C (98.9  F) 36.6  C (97.9  F)    SpO2: 99% 100% 99%         Electronically Signed By: Jake Blandon MD, December 10, 2018, 9:38 AM

## 2018-12-10 NOTE — ANESTHESIA PREPROCEDURE EVALUATION
Anesthesia Pre-Procedure Evaluation    Patient: Gwen Cash   MRN:     [id number] Gender:   female   Age:    53 year old :      1965        Preoperative Diagnosis: * No surgery found *        Past Medical History:   Diagnosis Date     Calculus of right kidney     had hydronephrosis & treated for mild corinna at that time, calcium phosphate stone s/p lithotripsy      Chronic depressive personality disorder      DUB (dysfunctional uterine bleeding)     work up including tsh ,labs, ecc , gyn eval normal      H/O echocardiogram 2011    at health [facility] was normal , no valvular issues, done for functional benign murmur noted in past      History of Papanicolaou smear of cervix 01, 04    NIL     OCD (obsessive compulsive disorder) 3/4/2017     Panic disorder without agoraphobia 3/4/2017     Tobacco use disorder 3/4/2017      Past Surgical History:   Procedure Laterality Date      SECTION      x 3     LITHOTRIPSY            Anesthesia Evaluation     . Pt has had prior anesthetic. Type: General and Regional    No history of anesthetic complications          ROS/MED HX    ENT/Pulmonary:     (+)tobacco use, , . .    Neurologic:  - neg neurologic ROS     Cardiovascular:  - neg cardiovascular ROS   (+) ----. : . . . :. . Previous cardiac testing date:results:date: results:ECG reviewed date: results:NSR. date: results:          METS/Exercise Tolerance:  >4 METS   Hematologic:  - neg hematologic  ROS       Musculoskeletal:  - neg musculoskeletal ROS       GI/Hepatic:  - neg GI/hepatic ROS       Renal/Genitourinary:     (+) Nephrolithiasis ,       Endo:  - neg endo ROS       Psychiatric: Comment: Severe anxiety and OCD.    (+) psychiatric history anxiety and other (comment)      Infectious Disease:  - neg infectious disease ROS       Malignancy:         Other:                         PHYSICAL EXAM:   Mental Status/Neuro:    Airway: Facies: Feasible  Mallampati: II  Mouth/Opening:  "Full  TM distance: > 6 cm  Neck ROM: Full   Respiratory: Auscultation: CTAB     Resp. Rate: Normal     Resp. Effort: Normal      CV: Rhythm: Regular  Rate: Age appropriate  Heart: Normal Sounds   Comments:                    Lab Results   Component Value Date    WBC 7.8 11/16/2018    HGB 15.9 (H) 11/16/2018    HCT 46.3 11/16/2018     11/16/2018     11/29/2018    POTASSIUM 4.4 11/29/2018    CHLORIDE 102 11/29/2018    CO2 30 11/29/2018    BUN 20 11/29/2018    CR 0.74 11/29/2018    GLC 90 11/29/2018    JOSE 9.6 11/29/2018    PHOS 3.4 11/29/2018    MAG 2.2 11/29/2018    ALBUMIN 4.9 11/16/2018    PROTTOTAL 8.3 11/16/2018    ALT 17 11/16/2018    AST 11 11/16/2018    ALKPHOS 74 11/16/2018    BILITOTAL 0.4 11/16/2018    TSH 5.89 (H) 11/16/2018    T4 0.93 11/16/2018    HCG Negative 11/16/2018       Preop Vitals  BP Readings from Last 3 Encounters:   12/09/18 120/68   03/03/17 126/84    Pulse Readings from Last 3 Encounters:   12/09/18 80   03/03/17 99      Resp Readings from Last 3 Encounters:   12/10/18 16   03/03/17 16    SpO2 Readings from Last 3 Encounters:   12/10/18 99%   03/03/17 96%      Temp Readings from Last 1 Encounters:   12/10/18 37.2  C (98.9  F) (Oral)    Ht Readings from Last 1 Encounters:   11/16/18 1.575 m (5' 2\")      Wt Readings from Last 1 Encounters:   12/09/18 44.6 kg (98 lb 4.8 oz)    Estimated body mass index is 17.98 kg/m  as calculated from the following:    Height as of 11/16/18: 1.575 m (5' 2\").    Weight as of 12/9/18: 44.6 kg (98 lb 4.8 oz).     LDA:  Peripheral IV 12/10/18 Anterior;Right Hand (Active)   Number of days: 0            Assessment:   ASA SCORE: 2       Documentation: H&P complete; Preop Testing complete; Consents complete   Proceeding: Proceed without further delay  Tobacco Use:  NO Active use of Tobacco/UNKNOWN Tobacco use status     Plan:   Anes. Type:  General   Pre-Induction: Midazolam IV; Acetaminophen PO   Induction:  IV (Standard)      Access/Monitoring: PIV "   Maintenance: Balanced   Emergence: Procedure Site   Logistics: Same Day Surgery     Postop Pain/Sedation Strategy:  Standard-Options: Opioids PRN     PONV Management:  Adult Risk Factors: Female, Non-Smoker, Postop Opioids       Comments for Plan/Consent:  Mask general anesthesia.                              Jake Blandon MD

## 2018-12-10 NOTE — PROCEDURES
Procedure/Surgery Information   [facility], [place]    Bedside Procedure Note  Date of Service (when I performed the procedure): 12/10/2018    Gwen Cash is a 53 year old female patient.  1. Major depressive disorder, remission status unspecified, unspecified whether recurrent    2. Severe recurrent major depression without psychotic features (H)      Past Medical History:   Diagnosis Date     Calculus of right kidney 1997    had hydronephrosis & treated for mild corinna at that time, calcium phosphate stone s/p lithotripsy      Chronic depressive personality disorder      DUB (dysfunctional uterine bleeding) 2004    work up including tsh ,labs, ecc , gyn eval normal      H/O echocardiogram 11/2011    at [facility] was normal , no valvular issues, done for functional benign murmur noted in past      History of Papanicolaou smear of cervix 9/14/01, 6/21/04    NIL     OCD (obsessive compulsive disorder) 3/4/2017     Panic disorder without agoraphobia 3/4/2017     Tobacco use disorder 3/4/2017     Temp: 98.9  F (37.2  C) Temp src: Oral BP: 120/68 Pulse: 80   Resp: 16 SpO2: 99 % O2 Device: None (Room air)      Procedures     Melo Wang     [facility], [place]   ECT Procedure Note   12/10/2018    Gwen Cash [id number]   53 year old 1965     Patient Status: Inpatient    Is this the first in a series of 12 treatments?  No    History and Physical: Reviewed in medical record    Consent: Informed consent was signed by: patient    Date Consent Signed: 12/7/18      Allergies   Allergen Reactions     Metronidazole Unknown     Abstracted data , patient cannot remember      No Clinical Screening - See Comments Hives     Penicillins Unknown     Abstracted data , patient cannot remember      Phenylbutazones        Weight:  98 lbs 4.8 oz              Indications for ECT:   Medications ineffective         Clinical Narrative:   Patient was admitted with severe  depression and OCD.   She's continuing ECT for depression.  NPO after 2400.  Alert and Oriented x 3.   She is less anxious today.   She had a little muscle ache after last ECT.           Diagnosis:   Major depression         Pause for the Cause:     Right patient Yes   Right procedure/laterality settings: Yes          Intra-Procedure Documentation:       ECT #: 2   Treatment number this series: 2   Total treatment number: 2     Type of ECT:  Right, unilateral ultrabrief    ECT Medications:   Lidocaine:  40mg   Brevital: 60mg  Succinyl Choline: 60mg    ECT Strip Summary:   Energy Level: 30 percent  Motor Seizure Duration: 35 seconds  EEG Seizure Duration:  43 seconds    Complications: No    Plan:   Next ECT 12/12/18    Melo Wang MD

## 2018-12-11 PROCEDURE — 25000132 ZZH RX MED GY IP 250 OP 250 PS 637: Performed by: PSYCHIATRY & NEUROLOGY

## 2018-12-11 PROCEDURE — 12400007 ZZH R&B MH INTERMEDIATE UMMC

## 2018-12-11 PROCEDURE — 99232 SBSQ HOSP IP/OBS MODERATE 35: CPT | Performed by: PSYCHIATRY & NEUROLOGY

## 2018-12-11 PROCEDURE — 25000132 ZZH RX MED GY IP 250 OP 250 PS 637: Performed by: PHYSICIAN ASSISTANT

## 2018-12-11 PROCEDURE — 25000132 ZZH RX MED GY IP 250 OP 250 PS 637: Performed by: EMERGENCY MEDICINE

## 2018-12-11 PROCEDURE — 25000132 ZZH RX MED GY IP 250 OP 250 PS 637

## 2018-12-11 RX ADMIN — MELATONIN TAB 3 MG 3 MG: 3 TAB at 22:41

## 2018-12-11 RX ADMIN — CLONAZEPAM 0.5 MG: 0.5 TABLET ORAL at 14:23

## 2018-12-11 RX ADMIN — Medication 500 MG: at 14:23

## 2018-12-11 RX ADMIN — LEVOTHYROXINE SODIUM 25 MCG: 25 TABLET ORAL at 09:32

## 2018-12-11 RX ADMIN — BUPROPION HYDROCHLORIDE 100 MG: 100 TABLET, FILM COATED ORAL at 14:23

## 2018-12-11 RX ADMIN — Medication 40 MG: at 14:23

## 2018-12-11 ASSESSMENT — ACTIVITIES OF DAILY LIVING (ADL)
HYGIENE/GROOMING: PROMPTS
HYGIENE/GROOMING: INDEPENDENT
ORAL_HYGIENE: INDEPENDENT
DRESS: INDEPENDENT
ORAL_HYGIENE: PROMPTS
LAUNDRY: WITH SUPERVISION

## 2018-12-11 NOTE — PROGRESS NOTES
[facility], [place]   Psychiatric Progress Note      Impression:   Gwen Cash is a 53 year old female admitted for severe depression and weight loss of 30 lbs. We started ECT on 12/7 to improve severe anxiety, OCD.  We are adjusting medications to target mood.  We are also working with the patient on therapeutic skill building.             Diagnoses and Plan:       Principal Diagnosis: MDD, recurrent, severe without psychotic features. OCD  Unit: station 10  Attending: Santana  Medications: risks/benefits discussed with patient-    - Paxil  40mg daily for compulsive behaviors, anxiety, depression  - Wellbutrin 100mg AM   -klonopin 0.5mg HS   -synthroid 25 mcg daily  - N-Acetyl cysteine 500mg daily  Laboratory/Imaging:  - Upreg neg, CBC wnl and BMP WNL  Consults:  -  Internal medicine, to evaluate synthroid dose.  Patient will be treated in therapeutic milieu with appropriate individual and group therapies as described.     ECT #3 scheduled on 12/12.      Medical diagnoses to be addressed this admission:   Weight loss of 30 lbs  Hypothyroidism      Legal Status: Voluntary      Safety Assessment:   Checks: Status 15  Precautions: Suicide  Pt has not required locked seclusion or restraints in the past 24 hours to maintain safety, please refer to RN documentation for further details.    The risks, benefits, alternatives and side effects have been discussed and are understood by the patient and other caregivers.     Target symptoms to stabilize: SI, depressed, neurovegetative symptoms, sleep issues and disordered eating  Target disposition: outpatient provider        Attestation:  Patient has been seen and evaluated by me,  Joe Dillon MD          Interim History:   The patient's care was discussed with the treatment team and chart notes were reviewed.    In team meeting, staff reported that they see a little improvement in pt's depression. The other night, she approached one of the  "night staff and started talking to him, which she had never done before. Staff feels that the pt is out of her room a little more.     In my interview today, pt started sobbing, saying that she is so scared of having next ECT tomorrow. Writer gave her numerous reassurance. She said she is afraid something is going to happen to her in ECT. She says that at night, staff comes in and touches her things on the shelf, so she has to reorganize them, and wishes they do not touch them at all.   She was encouraged to find things to distract her, not sitting in the room focusing on her worries. She repeated that she wishes her OCD will go away.   She denies SI, but it is apparent that her obsession is severely debilitating.    The 10 point Review of Systems is negative other than noted in the HPI         Medications:       acetylcysteine  500 mg Oral Daily     buPROPion  100 mg Oral Daily     clonazePAM  0.5 mg Oral Daily     influenza vaccine adult (product based on age)  0.5 mL Intramuscular Prior to discharge     levothyroxine  25 mcg Oral QAM AC     PAXIL  40 mg Oral Daily with lunch             Allergies:     Allergies   Allergen Reactions     Metronidazole Unknown     Abstracted data , patient cannot remember      No Clinical Screening - See Comments Hives     Penicillins Unknown     Abstracted data , patient cannot remember      Phenylbutazones             Psychiatric Examination:   /73   Pulse 83   Temp 98.5  F (36.9  C)   Resp 16   Ht 1.575 m (5' 2\")   Wt 44.6 kg (98 lb 4.8 oz)   SpO2 98%   BMI 17.98 kg/m    Weight is 98 lbs 4.8 oz  Body mass index is 17.98 kg/m .    Appearance:  awake, alert, appeared as age stated, cooperative, moderate distress, tearful and casually dressed  Attitude:  cooperative  Eye Contact:  poor   Mood:  anxious and sad   Affect:  constricted mobility with tearfulness  Speech:  clear, coherent  Psychomotor Behavior:  no evidence of tardive dyskinesia, dystonia, or tics and intact " station, gait and muscle tone  Thought Process:  linear  Associations:  no loose associations  Thought Content:  no evidence of suicidal ideation or homicidal ideation, no evidence of psychotic thought, no auditory hallucinations present, no visual hallucinations present, obsessions present and obessive fear and anxiety  Insight:  limited  Judgment:  limited  Oriented to:  time, person, and place  Attention Span and Concentration:  fair  Recent and Remote Memory:  fair  Language: Able to name objects  Fund of Knowledge: appropriate  Muscle Strength and Tone: normal  Gait and Station: Normal         Labs:   No results found for this or any previous visit (from the past 24 hour(s)).

## 2018-12-11 NOTE — TREATMENT PLAN
BEHAVIORAL TEAM DISCUSSION    Participants: Joe Dillon MD, Janey FLORES and Cira APPLE RN  Progress: Pt has not progressed. Mostly isolative to room. Continues to be extremely malodorous. Does not attend therapeutic group programming.   Continued Stay Criteria/Rationale: Pt started ECT treatments on 12/7/18, and will complete treatment #3 tomorrow. We need more time to see whether ECT is effective.   Medical/Physical: See medical consult, if applicable.   Precautions:   Behavioral Orders   Procedures     Code 1 - Restrict to Unit     Code 2     Only during ECT days (Mon, Wed, Fri)     Electroconvulsive therapy     ECT every Monday, Wednesday, and Friday     Electroconvulsive therapy     Series of up to 12 treatments. Begin Date: 12/7/18     Treating Psychiatrist providing ECT:  Kathleen     Notified on:  12/5/18     Fall precautions     Routine Programming     As clinically indicated     Status 15     Every 15 minutes.     Plan: Continue with inpatient ECT, continue to monitor Pt progress.   Rationale for change in precautions or plan: No change, continue with ECT treatments for severe depression and OCD symptoms.

## 2018-12-11 NOTE — PLAN OF CARE
Mood Impairment (Depressive Signs/Symptoms) Improved Mood Symptoms (Depressive Signs/Symptoms): Improving     Patient's obsessive/compulsive symptoms do not seem to be approving. Patient reports no improvement. Continues to be extremely anxious/fearful of ECT though she is willing to continue receiving treatments. Does appear less depressed, smiles more. Is out of her room more. Sleeps until about 11 am but states this is her normal routine. Continues to complain of decreased appetite and has no desire to eat. Did eat  50-75% of her lunch and drank boost. Patient denies suicidal ideation/plan/intent. Patient is medication compliant and no side effects reported or observed.

## 2018-12-12 ENCOUNTER — ANESTHESIA (OUTPATIENT)
Dept: BEHAVIORAL HEALTH | Facility: CLINIC | Age: 53
End: 2018-12-12

## 2018-12-12 ENCOUNTER — ANESTHESIA EVENT (OUTPATIENT)
Dept: BEHAVIORAL HEALTH | Facility: CLINIC | Age: 53
End: 2018-12-12

## 2018-12-12 PROCEDURE — 25000132 ZZH RX MED GY IP 250 OP 250 PS 637: Performed by: PSYCHIATRY & NEUROLOGY

## 2018-12-12 PROCEDURE — 25000128 H RX IP 250 OP 636: Performed by: ANESTHESIOLOGY

## 2018-12-12 PROCEDURE — 25000132 ZZH RX MED GY IP 250 OP 250 PS 637: Performed by: EMERGENCY MEDICINE

## 2018-12-12 PROCEDURE — 25000125 ZZHC RX 250: Performed by: PSYCHIATRY & NEUROLOGY

## 2018-12-12 PROCEDURE — 12400007 ZZH R&B MH INTERMEDIATE UMMC

## 2018-12-12 PROCEDURE — 90870 ELECTROCONVULSIVE THERAPY: CPT

## 2018-12-12 PROCEDURE — 99207 ZZC CDG-MDM COMPONENT: MEETS MODERATE - UP CODED: CPT | Performed by: PSYCHIATRY & NEUROLOGY

## 2018-12-12 PROCEDURE — 25000125 ZZHC RX 250: Performed by: ANESTHESIOLOGY

## 2018-12-12 PROCEDURE — 99232 SBSQ HOSP IP/OBS MODERATE 35: CPT | Mod: 25 | Performed by: PSYCHIATRY & NEUROLOGY

## 2018-12-12 PROCEDURE — 25000132 ZZH RX MED GY IP 250 OP 250 PS 637: Performed by: PHYSICIAN ASSISTANT

## 2018-12-12 PROCEDURE — 25000132 ZZH RX MED GY IP 250 OP 250 PS 637

## 2018-12-12 RX ORDER — LIDOCAINE HYDROCHLORIDE 20 MG/ML
40 INJECTION, SOLUTION EPIDURAL; INFILTRATION; INTRACAUDAL; PERINEURAL
Status: COMPLETED | OUTPATIENT
Start: 2018-12-12 | End: 2018-12-12

## 2018-12-12 RX ADMIN — Medication 60 MG: at 10:55

## 2018-12-12 RX ADMIN — LIDOCAINE HYDROCHLORIDE 40 MG: 20 INJECTION, SOLUTION EPIDURAL; INFILTRATION; INTRACAUDAL; PERINEURAL at 10:58

## 2018-12-12 RX ADMIN — Medication 40 MG: at 14:00

## 2018-12-12 RX ADMIN — CLONAZEPAM 0.5 MG: 0.5 TABLET ORAL at 14:00

## 2018-12-12 RX ADMIN — LEVOTHYROXINE SODIUM 25 MCG: 25 TABLET ORAL at 12:22

## 2018-12-12 RX ADMIN — METHOHEXITAL SODIUM 60 MG: 500 INJECTION, POWDER, LYOPHILIZED, FOR SOLUTION INTRAMUSCULAR; INTRAVENOUS; RECTAL at 10:55

## 2018-12-12 RX ADMIN — Medication 500 MG: at 14:00

## 2018-12-12 RX ADMIN — BUPROPION HYDROCHLORIDE 100 MG: 100 TABLET, FILM COATED ORAL at 14:00

## 2018-12-12 RX ADMIN — MELATONIN TAB 3 MG 3 MG: 3 TAB at 22:35

## 2018-12-12 ASSESSMENT — ACTIVITIES OF DAILY LIVING (ADL)
DRESS: STREET CLOTHES;INDEPENDENT
HYGIENE/GROOMING: PROMPTS
ORAL_HYGIENE: PROMPTS

## 2018-12-12 ASSESSMENT — LIFESTYLE VARIABLES: TOBACCO_USE: 1

## 2018-12-12 NOTE — PROGRESS NOTES
Patient returned from ECT. Denies pain. Vital signs within normal limits. States she feels lightheaded when she stands. No orthostatic BP drop. Patient encouraged to drink fluids, she complied and is resting in bed. Encouraged to eat lunch, she did not eat but drank boost.

## 2018-12-12 NOTE — ANESTHESIA POSTPROCEDURE EVALUATION
Anesthesia POST Procedure Evaluation    Patient: Gwen Cash   MRN:     [id number] Gender:   female   Age:    53 year old :      1965        Preoperative Diagnosis: * No pre-op diagnosis entered *   * No procedures listed *   Postop Comments: No value filed.       Anesthesia Type:  General    Reportable Event: NO     PAIN: Uncomplicated   Sign Out status: Comfortable, Well controlled pain     PONV: No PONV   Sign Out status:  No Nausea or Vomiting     Neuro/Psych: Uneventful perioperative course   Sign Out Status: Preoperative baseline; Age appropriate mentation     Airway/Resp.: Uneventful perioperative course   Sign Out Status: Non labored breathing, age appropriate RR; Resp. Status within EXPECTED Parameters     CV: Uneventful perioperative course   Sign Out status: Appropriate BP and perfusion indices; Appropriate HR/Rhythm     Disposition:   Sign Out in:  PACU  Disposition:  Phase II; Home  Recovery Course: Uneventful  Follow-Up: Not required           Last Anesthesia Record Vitals:  CRNA VITALS  2018 1034 - 2018 1134      2018             SpO2:  99 %    Resp Rate (set):  8          Last PACU/Preop Vitals:  Vitals:    18 1121 18 1136 18 1200   BP: 131/73 128/80 116/69   Pulse: 90 90 90   Resp: 20 20 16   Temp:  36.8  C (98.2  F) 36.4  C (97.6  F)   SpO2: 97% 96% 96%         Electronically Signed By: Mayra Hughes MD, 2018, 12:54 PM

## 2018-12-12 NOTE — PROCEDURES
Procedure/Surgery Information   [facility], [place]    Bedside Procedure Note  Date of Service (when I performed the procedure): 12/12/2018    Gwen Cash is a 53 year old female patient.  1. Major depressive disorder, remission status unspecified, unspecified whether recurrent    2. Severe recurrent major depression without psychotic features (H)      Past Medical History:   Diagnosis Date     Calculus of right kidney 1997    had hydronephrosis & treated for mild corinna at that time, calcium phosphate stone s/p lithotripsy      Chronic depressive personality disorder      DUB (dysfunctional uterine bleeding) 2004    work up including tsh ,labs, ecc , gyn eval normal      H/O echocardiogram 11/2011    at [facility] was normal , no valvular issues, done for functional benign murmur noted in past      History of Papanicolaou smear of cervix 9/14/01, 6/21/04    NIL     OCD (obsessive compulsive disorder) 3/4/2017     Panic disorder without agoraphobia 3/4/2017     Tobacco use disorder 3/4/2017     Temp: 97.2  F (36.2  C) Temp src: Tympanic BP: 130/77 Pulse: 74   Resp: 16 SpO2: 100 %        Procedures     Melo Wang     [facility], [place]   ECT Procedure Note   12/12/2018    Gwen Cash [id number]   53 year old 1965     Patient Status: Inpatient    Is this the first in a series of 12 treatments?  No    History and Physical: Reviewed in medical record    Consent: Informed consent was signed by: patient    Date Consent Signed: 12/7/18      Allergies   Allergen Reactions     Metronidazole Unknown     Abstracted data , patient cannot remember      No Clinical Screening - See Comments Hives     Penicillins Unknown     Abstracted data , patient cannot remember      Phenylbutazones        Weight:  98 lbs 4.8 oz              Indications for ECT:   Medications ineffective         Clinical Narrative:   Patient was admitted with severe depression and OCD.    She's continuing ECT for depression.  NPO after 2400.  Alert and Oriented x 3.   She is less anxious today.   She had a little muscle ache after last ECT.           Diagnosis:   Major depression         Pause for the Cause:     Right patient Yes   Right procedure/laterality settings: Yes          Intra-Procedure Documentation:       ECT #: 3   Treatment number this series: 3   Total treatment number: 3     Type of ECT:  Right, unilateral ultrabrief    ECT Medications:   Lidocaine:  40mg   Brevital: 60mg  Succinyl Choline: 60mg    ECT Strip Summary:   Energy Level: 35 percent  Motor Seizure Duration: 31 seconds  EEG Seizure Duration:  40 seconds    Complications: No    Plan:   Next ECT 12/14/18    Melo Wang MD

## 2018-12-12 NOTE — PROGRESS NOTES
[facility], [place]   Psychiatric Progress Note      Impression:   Gwen Cash is a 53 year old female admitted for severe depression and weight loss of 30 lbs. We started ECT on 12/7 to improve severe anxiety, OCD.  We are adjusting medications to target mood.  We are also working with the patient on therapeutic skill building.             Diagnoses and Plan:       Principal Diagnosis: MDD, recurrent, severe without psychotic features. OCD  Unit: station 10  Attending: Snatana  Medications: risks/benefits discussed with patient-    - Paxil  40mg daily for compulsive behaviors, anxiety, depression  - Wellbutrin 100mg AM   -klonopin 0.5mg HS   -synthroid 25 mcg daily  - N-Acetyl cysteine 500mg daily  Laboratory/Imaging:  - Upreg neg, CBC wnl and BMP WNL  Consults:  -  Internal medicine, to evaluate synthroid dose.  Patient will be treated in therapeutic milieu with appropriate individual and group therapies as described.     ECT #3 performed today.      Medical diagnoses to be addressed this admission:   Weight loss of 30 lbs  Hypothyroidism      Legal Status: Voluntary      Safety Assessment:   Checks: Status 15  Precautions: Suicide  Pt has not required locked seclusion or restraints in the past 24 hours to maintain safety, please refer to RN documentation for further details.    The risks, benefits, alternatives and side effects have been discussed and are understood by the patient and other caregivers.     Target symptoms to stabilize: SI, depressed, neurovegetative symptoms, sleep issues and disordered eating  Target disposition: outpatient provider        Attestation:  Patient has been seen and evaluated by me,  Joe Dillon MD          Interim History:   The patient's care was discussed with the treatment team and chart notes were reviewed.    5 family members came to the unit for pt, for emotional support, as today was ECT day. Pt received #3 ECT today. Afterwards, she c/o  "ooziness and RN got vital signs, which were WNL. Then pt went to lie down in bed, still feeling oozy.  Writer met her in her room. She said that she continues to feel scared of ECT Last night she slept pretty well.   She still has not taken shower at all since admitted, writer asked her about this and she said  Yes, I know, I have to. Maybe I am going to ask her daughter or sister to come and help her taking shower. Staff reports strong odor from her due to poor hygiene.  She continues to worry about the weight loss and not being able to gain weight back quickly    In team meeting, staff reported that last evening, she was seen out of her room more that she has been.     The 10 point Review of Systems is negative other than noted in the HPI         Medications:       acetylcysteine  500 mg Oral Daily     buPROPion  100 mg Oral Daily     clonazePAM  0.5 mg Oral Daily     influenza vaccine adult (product based on age)  0.5 mL Intramuscular Prior to discharge     levothyroxine  25 mcg Oral QAM AC     PAXIL  40 mg Oral Daily with lunch             Allergies:     Allergies   Allergen Reactions     Metronidazole Unknown     Abstracted data , patient cannot remember      No Clinical Screening - See Comments Hives     Penicillins Unknown     Abstracted data , patient cannot remember      Phenylbutazones             Psychiatric Examination:   /69   Pulse 90   Temp 97.6  F (36.4  C) (Oral)   Resp 16   Ht 1.575 m (5' 2\")   Wt 44.6 kg (98 lb 4.8 oz)   SpO2 99%   BMI 17.98 kg/m    Weight is 98 lbs 4.8 oz  Body mass index is 17.98 kg/m .    Appearance:  awake, alert, appeared as age stated, cooperative, moderate distress and casually dressed  Attitude:  cooperative  Eye Contact:  fair  Mood:  anxious and depressed  Affect:  constricted mobility  Speech:  clear, coherent  Psychomotor Behavior:  no evidence of tardive dyskinesia, dystonia, or tics and intact station, gait and muscle tone  Thought Process:  " linear  Associations:  no loose associations  Thought Content:  no evidence of suicidal ideation or homicidal ideation, no evidence of psychotic thought, no auditory hallucinations present, no visual hallucinations present and obsessions present  Insight:  limited  Judgment:  limited  Oriented to:  time, person, and place  Attention Span and Concentration:  limited  Recent and Remote Memory:  fair  Language: Able to name objects  Fund of Knowledge: appropriate  Muscle Strength and Tone: normal  Gait and Station: Normal         Labs:   No results found for this or any previous visit (from the past 24 hour(s)).

## 2018-12-12 NOTE — PROGRESS NOTES
"   12/11/18 1959   Behavioral Health   Hallucinations denies / not responding to hallucinations   Thinking intact   Orientation person: oriented;place: oriented   Memory baseline memory   Insight insight appropriate to situation   Judgement impaired   Eye Contact at examiner   Affect full range affect   Mood mood is calm   Physical Appearance/Attire neat   Hygiene well groomed   Suicidality other (see comments)  (denies )   1. Wish to be Dead No   2. Non-Specific Active Suicidal Thoughts  No   Self Injury other (see comment)  (denies)   Activity other (see comment)  (visible in the milieu and socialized with others)   Speech clear;coherent   Activities of Daily Living   Hygiene/Grooming independent   Oral Hygiene independent   Dress independent   Laundry with supervision   Room Organization independent       Pt denied SI and SIB.  Pt reported feeling depressed (6-7), sad (8), anxious (6) and  Scared \" I don't know how I came like this I don't know.\"  Pt reported feeling \" nervous william I have ECT for the third time.\"  Pt seems calm, ate supper, visible in the milieu and socialized with others.  Pt denied having any racing thoughts or psychotic symptoms.  Pt reported \" I don't really have an appetite, but I eat.\"  Pt reported sleeping ok, no pain and no SE's.  Pt reported feeling hopeful.  Pt is independent with ADL's.  Pt wants visitors.    "

## 2018-12-12 NOTE — ANESTHESIA PREPROCEDURE EVALUATION
Anesthesia Pre-Procedure Evaluation    Patient: Gwen Cash   MRN:     [id number] Gender:   female   Age:    53 year old :      1965        Preoperative Diagnosis: * No surgery found *        Past Medical History:   Diagnosis Date     Calculus of right kidney     had hydronephrosis & treated for mild corinna at that time, calcium phosphate stone s/p lithotripsy      Chronic depressive personality disorder      DUB (dysfunctional uterine bleeding)     work up including tsh ,labs, ecc , gyn eval normal      H/O echocardiogram 2011    at [facility] was normal , no valvular issues, done for functional benign murmur noted in past      History of Papanicolaou smear of cervix 01, 04    NIL     OCD (obsessive compulsive disorder) 3/4/2017     Panic disorder without agoraphobia 3/4/2017     Tobacco use disorder 3/4/2017      Past Surgical History:   Procedure Laterality Date      SECTION      x 3     LITHOTRIPSY            Anesthesia Evaluation     . Pt has had prior anesthetic. Type: General and Regional    No history of anesthetic complications          ROS/MED HX    ENT/Pulmonary:     (+)tobacco use, , . .    Neurologic:  - neg neurologic ROS     Cardiovascular:  - neg cardiovascular ROS   (+) ----. : . . . :. . Previous cardiac testing date:results:date: results:ECG reviewed date: results:NSR. date: results:          METS/Exercise Tolerance:  >4 METS   Hematologic:  - neg hematologic  ROS       Musculoskeletal:  - neg musculoskeletal ROS       GI/Hepatic:  - neg GI/hepatic ROS       Renal/Genitourinary:     (+) Nephrolithiasis ,       Endo:  - neg endo ROS       Psychiatric: Comment: Severe anxiety and OCD.    (+) psychiatric history anxiety and other (comment)      Infectious Disease:  - neg infectious disease ROS       Malignancy:         Other:                         PHYSICAL EXAM:   Mental Status/Neuro:    Airway: Facies: Feasible  Mallampati: II  Mouth/Opening:  "Full  TM distance: > 6 cm  Neck ROM: Full   Respiratory: Auscultation: CTAB     Resp. Rate: Normal     Resp. Effort: Normal      CV: Rhythm: Regular  Rate: Age appropriate  Heart: Normal Sounds   Comments:                    Lab Results   Component Value Date    WBC 7.8 11/16/2018    HGB 15.9 (H) 11/16/2018    HCT 46.3 11/16/2018     11/16/2018     11/29/2018    POTASSIUM 4.4 11/29/2018    CHLORIDE 102 11/29/2018    CO2 30 11/29/2018    BUN 20 11/29/2018    CR 0.74 11/29/2018    GLC 90 11/29/2018    JOSE 9.6 11/29/2018    PHOS 3.4 11/29/2018    MAG 2.2 11/29/2018    ALBUMIN 4.9 11/16/2018    PROTTOTAL 8.3 11/16/2018    ALT 17 11/16/2018    AST 11 11/16/2018    ALKPHOS 74 11/16/2018    BILITOTAL 0.4 11/16/2018    TSH 5.89 (H) 11/16/2018    T4 0.93 11/16/2018    HCG Negative 11/16/2018       Preop Vitals  BP Readings from Last 3 Encounters:   12/12/18 130/77   03/03/17 126/84    Pulse Readings from Last 3 Encounters:   12/12/18 74   03/03/17 99      Resp Readings from Last 3 Encounters:   12/11/18 16   03/03/17 16    SpO2 Readings from Last 3 Encounters:   12/12/18 100%   03/03/17 96%      Temp Readings from Last 1 Encounters:   12/12/18 36.2  C (97.2  F) (Tympanic)    Ht Readings from Last 1 Encounters:   11/16/18 1.575 m (5' 2\")      Wt Readings from Last 1 Encounters:   12/09/18 44.6 kg (98 lb 4.8 oz)    Estimated body mass index is 17.98 kg/m  as calculated from the following:    Height as of 11/16/18: 1.575 m (5' 2\").    Weight as of 12/9/18: 44.6 kg (98 lb 4.8 oz).     LDA:            Assessment:   ASA SCORE: 2       Documentation: H&P complete; Preop Testing complete; Consents complete   Proceeding: Proceed without further delay  Tobacco Use:  NO Active use of Tobacco/UNKNOWN Tobacco use status     Plan:   Anes. Type:  General   Pre-Induction: Midazolam IV; Acetaminophen PO   Induction:  IV (Standard)      Access/Monitoring: PIV   Maintenance: Balanced   Emergence: Procedure Site   Logistics: Same " Day Surgery     Postop Pain/Sedation Strategy:  Standard-Options: Opioids PRN     PONV Management:  Adult Risk Factors: Female, Non-Smoker, Postop Opioids       Comments for Plan/Consent:  Mask general anesthesia.                              Mayra Hughes MD

## 2018-12-12 NOTE — PROGRESS NOTES
Pt discharged from the recovery room via wheelchair back to station  10N with staff at  1146 .  Report given to station Cira VILLAGOMEZ,                  .

## 2018-12-13 PROCEDURE — 25000132 ZZH RX MED GY IP 250 OP 250 PS 637

## 2018-12-13 PROCEDURE — 25000132 ZZH RX MED GY IP 250 OP 250 PS 637: Performed by: PSYCHIATRY & NEUROLOGY

## 2018-12-13 PROCEDURE — 12400007 ZZH R&B MH INTERMEDIATE UMMC

## 2018-12-13 PROCEDURE — 25000132 ZZH RX MED GY IP 250 OP 250 PS 637: Performed by: PHYSICIAN ASSISTANT

## 2018-12-13 PROCEDURE — 25000132 ZZH RX MED GY IP 250 OP 250 PS 637: Performed by: EMERGENCY MEDICINE

## 2018-12-13 RX ORDER — ONDANSETRON 4 MG/1
4 TABLET, ORALLY DISINTEGRATING ORAL EVERY 6 HOURS PRN
Status: DISCONTINUED | OUTPATIENT
Start: 2018-12-13 | End: 2019-01-11 | Stop reason: HOSPADM

## 2018-12-13 RX ADMIN — ACETAMINOPHEN 650 MG: 325 TABLET, FILM COATED ORAL at 11:24

## 2018-12-13 RX ADMIN — CLONAZEPAM 0.5 MG: 0.5 TABLET ORAL at 13:56

## 2018-12-13 RX ADMIN — Medication 40 MG: at 13:55

## 2018-12-13 RX ADMIN — LEVOTHYROXINE SODIUM 25 MCG: 25 TABLET ORAL at 09:40

## 2018-12-13 RX ADMIN — BUPROPION HYDROCHLORIDE 100 MG: 100 TABLET, FILM COATED ORAL at 13:56

## 2018-12-13 RX ADMIN — Medication 500 MG: at 13:56

## 2018-12-13 ASSESSMENT — ACTIVITIES OF DAILY LIVING (ADL)
ORAL_HYGIENE: PROMPTS
ORAL_HYGIENE: PROMPTS
LAUNDRY: WITH SUPERVISION
DRESS: STREET CLOTHES
DRESS: STREET CLOTHES
HYGIENE/GROOMING: PROMPTS
HYGIENE/GROOMING: INDEPENDENT;PROMPTS

## 2018-12-13 NOTE — PROGRESS NOTES
"Pt was in the milieu for about 75% of the shift, she was observed watching movies in the lounge with peers or sitting in the dining room. She did not spend as much time tidying up her room as in previous days. Pt's overall affect was blunted/flat but brightened slightly upon approach. Pt's mood was anxious and depressed. Pt endorsed anxiety and depression, she rated depression 5/10 and anxiety 8/10. Pt reported feeling anxious about ECT and some of the side effects she has been experiencing. Pt endorsed feeling dizzy and faint at times. Writer encouraged her to keep up with her fluids and to take it easy on days she has ECT, but told her to let staff know if the symptoms got worse. Pt denied SI, SIB, and hallucinations. Pt had one visitor this evening and she said the visit went well. Pt ate about 90% of her meal but reported \"I still don't have much of an appetite, I just eat to put the food in me\". Pt still has not showered, when writer addressed her about it to set a goal for a day to shower pt replied \"I'm waiting for my sister to be able to come and help me\". Writer encouraged her to call her sister tomorrow to find times that work for her, pt said she would make it a goal to set a date to shower tomorrow. Overall pt had an ok shift, there were no major concerns.        12/12/18 2143   Behavioral Health   Hallucinations denies / not responding to hallucinations   Thinking distractable   Orientation person: oriented;place: oriented   Insight poor   Judgement impaired   Eye Contact at examiner   Affect blunted, flat;sad   Mood depressed;anxious   Physical Appearance/Attire attire appropriate to age and situation   Hygiene neglected grooming - unclean body, hair, teeth;body odor   Suicidality (Denies)   1. Wish to be Dead No   2. Non-Specific Active Suicidal Thoughts  No   Self Injury (Denies, none observed)   Elopement (None observed, no statements made)   Activity isolative;withdrawn   Speech clear;coherent "   Psychomotor / Gait balanced;steady   Activities of Daily Living   Hygiene/Grooming prompts   Oral Hygiene prompts   Dress street clothes;independent   Room Organization independent

## 2018-12-14 PROCEDURE — 12400007 ZZH R&B MH INTERMEDIATE UMMC

## 2018-12-14 PROCEDURE — 25000132 ZZH RX MED GY IP 250 OP 250 PS 637: Performed by: EMERGENCY MEDICINE

## 2018-12-14 PROCEDURE — 25000132 ZZH RX MED GY IP 250 OP 250 PS 637

## 2018-12-14 PROCEDURE — 25000132 ZZH RX MED GY IP 250 OP 250 PS 637: Performed by: PSYCHIATRY & NEUROLOGY

## 2018-12-14 PROCEDURE — 99232 SBSQ HOSP IP/OBS MODERATE 35: CPT | Performed by: PSYCHIATRY & NEUROLOGY

## 2018-12-14 PROCEDURE — 25000132 ZZH RX MED GY IP 250 OP 250 PS 637: Performed by: PHYSICIAN ASSISTANT

## 2018-12-14 PROCEDURE — 99207 ZZC CDG-MDM COMPONENT: MEETS MODERATE - UP CODED: CPT | Performed by: PSYCHIATRY & NEUROLOGY

## 2018-12-14 RX ADMIN — ACETAMINOPHEN 650 MG: 325 TABLET, FILM COATED ORAL at 12:29

## 2018-12-14 RX ADMIN — CLONAZEPAM 0.5 MG: 0.5 TABLET ORAL at 14:41

## 2018-12-14 RX ADMIN — BUPROPION HYDROCHLORIDE 100 MG: 100 TABLET, FILM COATED ORAL at 14:41

## 2018-12-14 RX ADMIN — Medication 40 MG: at 14:41

## 2018-12-14 RX ADMIN — Medication 500 MG: at 14:41

## 2018-12-14 RX ADMIN — LEVOTHYROXINE SODIUM 25 MCG: 25 TABLET ORAL at 10:28

## 2018-12-14 RX ADMIN — MELATONIN TAB 3 MG 3 MG: 3 TAB at 21:57

## 2018-12-14 ASSESSMENT — ACTIVITIES OF DAILY LIVING (ADL)
HYGIENE/GROOMING: INDEPENDENT
ORAL_HYGIENE: PROMPTS
ORAL_HYGIENE: INDEPENDENT
HYGIENE/GROOMING: PROMPTS
LAUNDRY: WITH SUPERVISION
DRESS: SCRUBS (BEHAVIORAL HEALTH)
DRESS: PROMPTS

## 2018-12-14 NOTE — PROGRESS NOTES
"   12/14/18 0962   Behavioral Health   Hallucinations denies / not responding to hallucinations   Thinking poor concentration   Orientation person: oriented;place: oriented;date, disoriented;time, disoriented   Memory baseline memory   Insight poor   Judgement impaired   Eye Contact at examiner   Affect blunted, flat   Mood depressed;anxious   Physical Appearance/Attire untidy;disheveled   Hygiene neglected grooming - unclean body, hair, teeth   Suicidality (Pt denies)   1. Wish to be Dead No   2. Non-Specific Active Suicidal Thoughts  No   Self Injury other (see comment)  (Pt denies)   Elopement (none observed)   Activity isolative;restless   Speech clear;coherent   Medication Sensitivity no stated side effects   Psychomotor / Gait balanced   Activities of Daily Living   Hygiene/Grooming prompts   Oral Hygiene prompts   Dress prompts   Room Organization independent       Pt has been isolative to her room throughout this entire shift. She has only been out of room a few times, for a few minutes, to either make phone calls or med requests then returns to bed.   Pt complains of pain \"all over my body\". She states that she has already discussed symptoms with her nurse and this wr has notified RN as well.     Pt denies any SI/SIB, AH/VH, and SE to medications. She does rate her anxiety at an 8 and her depression at about a 4 out of 10. She states \"I think my depression is getting better, maybe. But I am still pretty anxious about ECT\".     Pt has not showered this shift. She also has not had any meals, but did take her boost from her lunch tray.   "

## 2018-12-14 NOTE — PROGRESS NOTES
"CLINICAL NUTRITION SERVICES - REASSESSMENT NOTE     Nutrition Prescription    RECOMMENDATIONS FOR MDs/PROVIDERS TO ORDER:  Please continue to encouraged po intakes and 3 Boost/day.   Recommend discuss trialing an appetite stimulant with patient given continuation of poor appetite.    Malnutrition Status:    Non-severe malnutrition in the context of environmental or social circumstances    Recommendations already ordered by Registered Dietitian (RD):  Continue Boost TID with meals as ordered  Continue Magic Cup with lunch and gelatein with dinner    Future/Additional Recommendations:  Monitor po intakes and wt trends. Consider need to add/discontinue supplement or adjust flavors pending trends and pt preferences.    Continue to encourage Boost 3/day.      EVALUATION OF THE PROGRESS TOWARD GOALS   Diet: Regular, Boost Plus with meals TID, Magic Cup with lunch, Gelatein with dinner   Intake: Pt preferred not to visit with writer today due to feeling unwell. Per discussion with RN, intakes have been decreased over the past couple does as patient is stating she is not feeling well and complaining of \"flu-like\" symptoms. Even though she has not been eating as much at meals, she has still been drinking her nutritional supplements. Gwen asked writer to continue her supplements as ordered. Per chart review, prior to the past couple days was eating lunch and dinner (historically does not eat breakfast often. Ate 90% of her meal on 12/12, but reported she still does not have a big appetite, but eats because she knows she needs to     NEW FINDINGS   -Started ECT  -Weight: Most recent weight taken 12/9 of 44.6 kg, suggesting weight has been stable over the past week and up 3 lbs from admission. Remains in underweight BMI category. Pt had reported UBW of 125 lb. Per previous assessment, She states that in Jan/Feb, she went to the doctor and was 119 lbs. This is when she realized she was losing weight. She feels weight loss has " been more rapid in the past few months. No recent wt history available in [facility], making it difficult to quantify wt loss. Per pt's report, wt loss of at least 22 lb (18%) over the past ~11 months.  She continues to be concerned about her wt loss and current struggle with gaining wt.    MALNUTRITION  % Intake: Decreased intake does not meet criteria  % Weight Loss: Up to 20% in 1 year (non-severe)  Subcutaneous Fat Loss: Facial region: Mild-moderate  Muscle Loss: Temporal, Facial & jaw region, Scapular bone and Thoracic region (clavicle, acromium bone, deltoid, trapezius, pectoral): Moderate  Fluid Accumulation/Edema: None noted  Malnutrition Diagnosis: Non-severe malnutrition in the context of environmental or social circumstances    Previous Goals   1. Patient to consume % of nutritionally adequate meal trays TID, or the equivalent with supplements/snacks.  Evaluation: Not met  2. BMI to progress towards Normal BMI status (>18.5 kg/m2).  Evaluation: Not met  3. Patient to drink 3 Boost/day.  Evaluation: Not met consistently     Previous Nutrition Diagnosis  Increased nutrient needs (protein-energy) related to wt loss PTA and inability to regain significant wt during admission despite intakes as evidenced by wt loss of ~18% over the past 11 months and estimated current intakes of ~2250 kcal, 95 g protein (51 kcal/kg, 2.2 g protein/kg).  Evaluation: No change, updated below     CURRENT NUTRITION DIAGNOSIS  Inadequate oral intake related to suboptimal appetite and not feeling well over the past couple days as evidenced by skipping many meals over the past couple days, overall reduced intakes over the past several months, and wt loss over the past ~11 months.      INTERVENTIONS  Implementation  Medical food supplement therapy - continue sending Boost Plus TID with meals. Encouraged pt to increase her Boost intakes as able. Continue Magic cup and Gelatein.   Discussed with RN - Discussed recent intakes  with RN. Per RN, patient eating less over the past couple of days as she reports she is feeling unwell.     Goals  1. Patient to consume % of nutritionally adequate meal trays TID, or the equivalent with supplements/snacks.  2. BMI to progress towards Normal BMI status (>18.5 kg/m2).  3. Patient to drink 3 Boost/day.    Monitoring/Evaluation  Progress toward goals will be monitored and evaluated per protocol.    Natasha Hutchison RD, LD  Unit Pager: [phone]

## 2018-12-14 NOTE — PLAN OF CARE
"48 hour nursing assessment:    Patient has been isolative in room all shift. She did not eat. She drank boost only. She states she feels like she is come down with a cold. She c/o of body aches but refused tylenol. She is not coughing stating she just feels \"like I have phlegm in my throat\". She is refusing to go to ECT tomorrow am stating \"I just don't feel up to it\". Dr. Powell did talk to her about it as well as this writer and RN on day shift. She still has not showered and is wearing the same clothing when admitted. She is calm and cooperative. She did not attend meetings. She rates her depression a 5 and anxiety a 7. She states her concentration is \"soso\" and denies racing thoughts. She denies SI and SIB. Her affect is full range. She is feeling hopeful. Her appetite is poor but states her sleep is good. She had no visitors tonight. She has not concerns.  "

## 2018-12-14 NOTE — PROGRESS NOTES
[facility], [place]   Psychiatric Progress Note      Impression:   Gwen Cash is a 53 year old female admitted for severe depression and weight loss of 30 lbs. We started ECT on 12/7 to improve severe anxiety, OCD.  We are adjusting medications to target mood.  We are also working with the patient on therapeutic skill building.             Diagnoses and Plan:       Principal Diagnosis: MDD, recurrent, severe without psychotic features. OCD  Unit: station 10  Attending: Santana  Medications: risks/benefits discussed with patient-    - Paxil  40mg daily for compulsive behaviors, anxiety, depression  - Wellbutrin 100mg AM   -klonopin 0.5mg HS   -synthroid 25 mcg daily  - N-Acetyl cysteine 500mg daily  Laboratory/Imaging:  - Upreg neg, CBC wnl and BMP WNL  Consults:  -  Internal medicine, to evaluate synthroid dose.  Patient will be treated in therapeutic milieu with appropriate individual and group therapies as described.     ECT #4 scheduled Monday, 12/17     Medical diagnoses to be addressed this admission:   Weight loss of 30 lbs  Hypothyroidism      Legal Status: Voluntary      Safety Assessment:   Checks: Status 15  Precautions: Suicide  Pt has not required locked seclusion or restraints in the past 24 hours to maintain safety, please refer to RN documentation for further details.    The risks, benefits, alternatives and side effects have been discussed and are understood by the patient and other caregivers.     Target symptoms to stabilize: SI, depressed, neurovegetative symptoms, sleep issues and disordered eating  Target disposition: outpatient provider        Attestation:  Patient has been seen and evaluated by me,  Joe Dillon MD          Interim History:   The patient's care was discussed with the treatment team and chart notes were reviewed.    In team meeting today, staff reported that the patient refused to go to ECT this morning, due to severe anxiety about ECT.  "  Staff reports that pt continues to be very obsessive about physical symptoms and imagine the worst case of scenario.     Writer went to talk to the pt, then pt was laying in bed, wrapped in blanket. She looked up to me and said she was feeling physically sick, with body ache and nausea. She said that she is wondering if family member brought a virus to her. She said that she did not mean to stop ECT, and she plans to go to ECT next Monday.       The 10 point Review of Systems is negative other than noted in the HPI         Medications:       acetylcysteine  500 mg Oral Daily     buPROPion  100 mg Oral Daily     clonazePAM  0.5 mg Oral Daily     influenza vaccine adult (product based on age)  0.5 mL Intramuscular Prior to discharge     levothyroxine  25 mcg Oral QAM AC     PAXIL  40 mg Oral Daily with lunch             Allergies:     Allergies   Allergen Reactions     Metronidazole Unknown     Abstracted data , patient cannot remember      No Clinical Screening - See Comments Hives     Penicillins Unknown     Abstracted data , patient cannot remember      Phenylbutazones             Psychiatric Examination:   BP 92/62   Pulse 82   Temp 98.3  F (36.8  C) (Tympanic)   Resp 16   Ht 1.575 m (5' 2\")   Wt 44.6 kg (98 lb 4.8 oz)   SpO2 99%   BMI 17.98 kg/m    Weight is 98 lbs 4.8 oz  Body mass index is 17.98 kg/m .    Appearance:  awake, alert, appeared as age stated, cooperative, mild distress, lying in bed, wrapped in blanket and casually dressed  Attitude:  cooperative  Eye Contact:  fair  Mood:  anxious and depressed  Affect:  constricted mobility  Speech:  paucity of speech and low volume  Psychomotor Behavior:  no evidence of tardive dyskinesia, dystonia, or tics, intact station, gait and muscle tone and physical retardation  Thought Process:  linear  Associations:  no loose associations  Thought Content:  no evidence of suicidal ideation or homicidal ideation, no evidence of psychotic thought, no auditory " hallucinations present, no visual hallucinations present and obsessions present  Insight:  limited  Judgment:  limited  Oriented to:  time, person, and place  Attention Span and Concentration:  fair  Recent and Remote Memory:  fair  Language: Able to name objects  Fund of Knowledge: appropriate  Muscle Strength and Tone: normal  Gait and Station: Normal         Labs:   No results found for this or any previous visit (from the past 24 hour(s)).

## 2018-12-15 PROCEDURE — 25000132 ZZH RX MED GY IP 250 OP 250 PS 637: Performed by: PHYSICIAN ASSISTANT

## 2018-12-15 PROCEDURE — 12400007 ZZH R&B MH INTERMEDIATE UMMC

## 2018-12-15 PROCEDURE — 25000132 ZZH RX MED GY IP 250 OP 250 PS 637: Performed by: PSYCHIATRY & NEUROLOGY

## 2018-12-15 PROCEDURE — 25000132 ZZH RX MED GY IP 250 OP 250 PS 637

## 2018-12-15 PROCEDURE — 25000132 ZZH RX MED GY IP 250 OP 250 PS 637: Performed by: EMERGENCY MEDICINE

## 2018-12-15 RX ADMIN — BUPROPION HYDROCHLORIDE 100 MG: 100 TABLET, FILM COATED ORAL at 13:52

## 2018-12-15 RX ADMIN — MELATONIN TAB 3 MG 3 MG: 3 TAB at 22:42

## 2018-12-15 RX ADMIN — CLONAZEPAM 0.5 MG: 0.5 TABLET ORAL at 13:52

## 2018-12-15 RX ADMIN — Medication 40 MG: at 13:52

## 2018-12-15 RX ADMIN — Medication 500 MG: at 13:52

## 2018-12-15 RX ADMIN — LEVOTHYROXINE SODIUM 25 MCG: 25 TABLET ORAL at 09:16

## 2018-12-15 ASSESSMENT — ACTIVITIES OF DAILY LIVING (ADL)
ORAL_HYGIENE: PROMPTS
DRESS: PROMPTS
LAUNDRY: UNABLE TO COMPLETE
HYGIENE/GROOMING: PROMPTS
LAUNDRY: UNABLE TO COMPLETE
ORAL_HYGIENE: PROMPTS
DRESS: PROMPTS
HYGIENE/GROOMING: PROMPTS

## 2018-12-15 NOTE — PROGRESS NOTES
"   12/14/18 2208   Behavioral [facility]   Hallucinations denies / not responding to hallucinations   Thinking intact   Orientation time: oriented;date: oriented;place: oriented   Memory baseline memory   Insight insight appropriate to situation   Judgement impaired   Eye Contact at examiner   Affect blunted, flat   Mood mood is calm   Physical Appearance/Attire attire appropriate to age and situation   Hygiene neglected grooming - unclean body, hair, teeth   Suicidality (denied )   1. Wish to be Dead No   2. Non-Specific Active Suicidal Thoughts  No   Self Injury (denied )   Elopement (denied )   Activity withdrawn;isolative   Speech clear;coherent   Medication Sensitivity no stated side effects;no observed side effects   Psychomotor / Gait balanced   Psycho Education   Type of Intervention 1:1 intervention   Response participates, initiates socially appropriate   Hours 0.5   Activities of Daily Living   Hygiene/Grooming independent   Oral Hygiene independent   Dress scrubs (behavioral health)   Laundry with supervision   Room Organization independent   Activity   Activity Assistance Provided independent     Pt was calm and cooperative with flat mood affect. She spent the all shift in her room resting, she stated \"I'm not feeling well today\" she rated depression 5/10 and anxiety 7/10. She denied any paranoid thoughts, psychotic symptoms and SI/SIB thoughts. She is hoping to discharge near the future. Appetite was \"poor\" and sleeping \"ok\" at night.   "

## 2018-12-15 NOTE — PLAN OF CARE
"\"I'm down today, I thought I was feeling physically better, but I'm not and now I'm feeling more depressed.\"  Approached staff with above comment, was tearful.  Initially this morning indicated was feeling better and would try to eat lunch.  This staff spoke to patient regarding the importance of resuming ECT on 12/17, that has not had enough treatment yet to improve mood and this would not improve unless resumed and completed ECT.  Was reassured that would be able to have treatment even if experiencing flu type symptoms, the treatments were not contraindicated.  It was reinforced that is not making any lasting progress at this time and would remain depressed unless followed through with ECT.  Patient nodded, but did not say anything.  Has remained in room thus far, coming to med window for morning medication and to dining area for water.  Did not eat breakfast which is routine.  Indicated was awake more last night due to staying in bed all day yesterday.  Continues to make no effort to shower or attend to even basic ADL's.  Continues to wear and sleep in the same clothing that has on since admission on 11/16/18.  Continues to deny suicidal ideation or self harm thoughts.  "

## 2018-12-16 PROCEDURE — 25000132 ZZH RX MED GY IP 250 OP 250 PS 637: Performed by: PSYCHIATRY & NEUROLOGY

## 2018-12-16 PROCEDURE — 12400007 ZZH R&B MH INTERMEDIATE UMMC

## 2018-12-16 PROCEDURE — 25000132 ZZH RX MED GY IP 250 OP 250 PS 637: Performed by: EMERGENCY MEDICINE

## 2018-12-16 PROCEDURE — 25000132 ZZH RX MED GY IP 250 OP 250 PS 637: Performed by: PHYSICIAN ASSISTANT

## 2018-12-16 PROCEDURE — 25000132 ZZH RX MED GY IP 250 OP 250 PS 637

## 2018-12-16 RX ADMIN — Medication 500 MG: at 14:26

## 2018-12-16 RX ADMIN — Medication 40 MG: at 14:26

## 2018-12-16 RX ADMIN — LEVOTHYROXINE SODIUM 25 MCG: 25 TABLET ORAL at 09:32

## 2018-12-16 RX ADMIN — CLONAZEPAM 0.5 MG: 0.5 TABLET ORAL at 14:26

## 2018-12-16 RX ADMIN — BUPROPION HYDROCHLORIDE 100 MG: 100 TABLET, FILM COATED ORAL at 14:26

## 2018-12-16 RX ADMIN — MELATONIN TAB 3 MG 3 MG: 3 TAB at 22:29

## 2018-12-16 ASSESSMENT — ACTIVITIES OF DAILY LIVING (ADL)
DRESS: INDEPENDENT;PROMPTS
HYGIENE/GROOMING: PROMPTS
ORAL_HYGIENE: INDEPENDENT;PROMPTS
LAUNDRY: WITH SUPERVISION
ORAL_HYGIENE: PROMPTS
HYGIENE/GROOMING: INDEPENDENT;PROMPTS
LAUNDRY: UNABLE TO COMPLETE
DRESS: PROMPTS

## 2018-12-16 ASSESSMENT — MIFFLIN-ST. JEOR: SCORE: 997.78

## 2018-12-16 NOTE — PLAN OF CARE
RN ASSESSMENT:     Pt states she had an okay day. She states she feel her depression has decreased since she has been here in the hospital, but that her anxiety has possibly increased as her ECT treatments make her very anxious. Pt states that she does not like the fact that she feels the need to be constantly re-organizing things in her room. She feels exhausted by it all. Pt is anxious to shower with the help of her sister as she is worried about falling down in the shower. Pt states she has had no falls recently. Pt has been appropriate on the unit and mildly social with peers while eating and interacting in the lounge. Pt ate 100% of her dinner. Pt had 4 visitors and received a few phone calls. Pt states she is robert to the her support system and appreciates the fact that they can visit her on her ECT days. Writer spent time reassuring her about her ECT treatments and what effects to look forward to. Pt still has a body odor and is neglecting self grooming while focusing on environmental tidying. When asked about SI/SIB pt stated that, in contrast, she is always worried about something bad happening to her.

## 2018-12-16 NOTE — PROGRESS NOTES
For lunch today ate all of macaroni and cheese, mashed potatoes and gravy, ice cream and milk.  Ate 1/3 of broccoli and drank boost.

## 2018-12-17 ENCOUNTER — ANESTHESIA (OUTPATIENT)
Dept: BEHAVIORAL HEALTH | Facility: CLINIC | Age: 53
End: 2018-12-17

## 2018-12-17 ENCOUNTER — ANESTHESIA EVENT (OUTPATIENT)
Dept: BEHAVIORAL HEALTH | Facility: CLINIC | Age: 53
End: 2018-12-17

## 2018-12-17 PROCEDURE — 25000132 ZZH RX MED GY IP 250 OP 250 PS 637: Performed by: PHYSICIAN ASSISTANT

## 2018-12-17 PROCEDURE — 25000132 ZZH RX MED GY IP 250 OP 250 PS 637

## 2018-12-17 PROCEDURE — 12400007 ZZH R&B MH INTERMEDIATE UMMC

## 2018-12-17 PROCEDURE — 99207 ZZC CDG-MDM COMPONENT: MEETS MODERATE - UP CODED: CPT | Performed by: PSYCHIATRY & NEUROLOGY

## 2018-12-17 PROCEDURE — 25000132 ZZH RX MED GY IP 250 OP 250 PS 637: Performed by: EMERGENCY MEDICINE

## 2018-12-17 PROCEDURE — 90870 ELECTROCONVULSIVE THERAPY: CPT

## 2018-12-17 PROCEDURE — 99232 SBSQ HOSP IP/OBS MODERATE 35: CPT | Mod: 25 | Performed by: PSYCHIATRY & NEUROLOGY

## 2018-12-17 PROCEDURE — 25000125 ZZHC RX 250: Performed by: STUDENT IN AN ORGANIZED HEALTH CARE EDUCATION/TRAINING PROGRAM

## 2018-12-17 PROCEDURE — 25000132 ZZH RX MED GY IP 250 OP 250 PS 637: Performed by: PSYCHIATRY & NEUROLOGY

## 2018-12-17 PROCEDURE — 25000128 H RX IP 250 OP 636: Performed by: STUDENT IN AN ORGANIZED HEALTH CARE EDUCATION/TRAINING PROGRAM

## 2018-12-17 PROCEDURE — 25000125 ZZHC RX 250: Performed by: PSYCHIATRY & NEUROLOGY

## 2018-12-17 RX ORDER — LIDOCAINE HYDROCHLORIDE 20 MG/ML
40 INJECTION, SOLUTION EPIDURAL; INFILTRATION; INTRACAUDAL; PERINEURAL
Status: COMPLETED | OUTPATIENT
Start: 2018-12-17 | End: 2018-12-17

## 2018-12-17 RX ADMIN — METHOHEXITAL SODIUM 60 MG: 500 INJECTION, POWDER, LYOPHILIZED, FOR SOLUTION INTRAMUSCULAR; INTRAVENOUS; RECTAL at 10:37

## 2018-12-17 RX ADMIN — LEVOTHYROXINE SODIUM 25 MCG: 25 TABLET ORAL at 11:43

## 2018-12-17 RX ADMIN — BUPROPION HYDROCHLORIDE 100 MG: 100 TABLET, FILM COATED ORAL at 15:24

## 2018-12-17 RX ADMIN — Medication 40 MG: at 15:24

## 2018-12-17 RX ADMIN — CLONAZEPAM 0.5 MG: 0.5 TABLET ORAL at 15:24

## 2018-12-17 RX ADMIN — MELATONIN TAB 3 MG 3 MG: 3 TAB at 22:17

## 2018-12-17 RX ADMIN — Medication 60 MG: at 10:37

## 2018-12-17 RX ADMIN — Medication 500 MG: at 15:24

## 2018-12-17 RX ADMIN — LIDOCAINE HYDROCHLORIDE 40 MG: 20 INJECTION, SOLUTION EPIDURAL; INFILTRATION; INTRACAUDAL; PERINEURAL at 10:36

## 2018-12-17 ASSESSMENT — LIFESTYLE VARIABLES: TOBACCO_USE: 1

## 2018-12-17 ASSESSMENT — ACTIVITIES OF DAILY LIVING (ADL)
DRESS: STREET CLOTHES;INDEPENDENT
LAUNDRY: WITH SUPERVISION
ORAL_HYGIENE: PROMPTS
HYGIENE/GROOMING: INDEPENDENT;PROMPTS
DRESS: PROMPTS;STREET CLOTHES
ORAL_HYGIENE: INDEPENDENT;PROMPTS
HYGIENE/GROOMING: PROMPTS

## 2018-12-17 NOTE — ANESTHESIA POSTPROCEDURE EVALUATION
Anesthesia POST Procedure Evaluation    Patient: Gwen Cash   MRN:     [id number] Gender:   female   Age:    53 year old :      1965        Preoperative Diagnosis: * No pre-op diagnosis entered *   * No procedures listed *   Postop Comments: No value filed.       Anesthesia Type:  General    Reportable Event: NO     PAIN: Uncomplicated   Sign Out status: Comfortable, Well controlled pain     PONV: No PONV   Sign Out status:  No Nausea or Vomiting     Neuro/Psych: Uneventful perioperative course   Sign Out Status: Preoperative baseline; Age appropriate mentation     Airway/Resp.: Uneventful perioperative course   Sign Out Status: Non labored breathing, age appropriate RR; Resp. Status within EXPECTED Parameters     CV: Uneventful perioperative course   Sign Out status: Appropriate BP and perfusion indices; Appropriate HR/Rhythm     Disposition:   Sign Out in:  PACU (ect)  Recovery Course: Uneventful  Follow-Up: Not required           Last Anesthesia Record Vitals:  CRNA VITALS  2018 1020 - 2018 1116      2018             Pulse:  94    Ht Rate:  94    SpO2:  100 %    Resp Rate (set):  8          Last PACU/Preop Vitals:  Vitals:    18 0903 18 1053 18 1108   BP:  157/84 142/80   Pulse:  97 87   Resp: 16 20 16   Temp: 36.4  C (97.6  F) 36.8  C (98.2  F)    SpO2: 98% 97% 97%         Electronically Signed By: Anthony Verma MD, 2018, 11:16 AM

## 2018-12-17 NOTE — ANESTHESIA PREPROCEDURE EVALUATION
Anesthesia Pre-Procedure Evaluation    Patient: Gwen Cash   MRN:     [id number] Gender:   female   Age:    53 year old :      1965        Preoperative Diagnosis: * No surgery found *        Past Medical History:   Diagnosis Date     Calculus of right kidney     had hydronephrosis & treated for mild corinna at that time, calcium phosphate stone s/p lithotripsy      Chronic depressive personality disorder      DUB (dysfunctional uterine bleeding)     work up including tsh ,labs, ecc , gyn eval normal      H/O echocardiogram 2011    at [facility] was normal , no valvular issues, done for functional benign murmur noted in past      History of Papanicolaou smear of cervix 01, 04    NIL     OCD (obsessive compulsive disorder) 3/4/2017     Panic disorder without agoraphobia 3/4/2017     Tobacco use disorder 3/4/2017      Past Surgical History:   Procedure Laterality Date      SECTION      x 3     LITHOTRIPSY            Anesthesia Evaluation     . Pt has had prior anesthetic. Type: General and Regional    No history of anesthetic complications          ROS/MED HX    ENT/Pulmonary:     (+)tobacco use, , . .    Neurologic:  - neg neurologic ROS     Cardiovascular:  - neg cardiovascular ROS   (+) ----. : . . . :. . Previous cardiac testing date:results:date: results:ECG reviewed date: results:NSR. date: results:          METS/Exercise Tolerance:  >4 METS   Hematologic:  - neg hematologic  ROS       Musculoskeletal:  - neg musculoskeletal ROS       GI/Hepatic:  - neg GI/hepatic ROS       Renal/Genitourinary:     (+) Nephrolithiasis ,       Endo:  - neg endo ROS       Psychiatric: Comment: Severe anxiety and OCD.    (+) psychiatric history anxiety and other (comment)      Infectious Disease:  - neg infectious disease ROS       Malignancy:         Other:                         PHYSICAL EXAM:   Mental Status/Neuro:    Airway: Facies: Feasible  Mallampati: II  Mouth/Opening:  "Full  TM distance: > 6 cm  Neck ROM: Full   Respiratory: Auscultation: CTAB     Resp. Rate: Normal     Resp. Effort: Normal      CV: Rhythm: Regular  Rate: Age appropriate  Heart: Normal Sounds   Comments:                    Lab Results   Component Value Date    WBC 7.8 11/16/2018    HGB 15.9 (H) 11/16/2018    HCT 46.3 11/16/2018     11/16/2018     11/29/2018    POTASSIUM 4.4 11/29/2018    CHLORIDE 102 11/29/2018    CO2 30 11/29/2018    BUN 20 11/29/2018    CR 0.74 11/29/2018    GLC 90 11/29/2018    JOSE 9.6 11/29/2018    PHOS 3.4 11/29/2018    MAG 2.2 11/29/2018    ALBUMIN 4.9 11/16/2018    PROTTOTAL 8.3 11/16/2018    ALT 17 11/16/2018    AST 11 11/16/2018    ALKPHOS 74 11/16/2018    BILITOTAL 0.4 11/16/2018    TSH 5.89 (H) 11/16/2018    T4 0.93 11/16/2018    HCG Negative 11/16/2018       Preop Vitals  BP Readings from Last 3 Encounters:   12/14/18 103/73   03/03/17 126/84    Pulse Readings from Last 3 Encounters:   12/14/18 89   03/03/17 99      Resp Readings from Last 3 Encounters:   12/17/18 16   03/03/17 16    SpO2 Readings from Last 3 Encounters:   12/17/18 98%   03/03/17 96%      Temp Readings from Last 1 Encounters:   12/17/18 36.4  C (97.6  F) (Oral)    Ht Readings from Last 1 Encounters:   11/16/18 1.575 m (5' 2\")      Wt Readings from Last 1 Encounters:   12/16/18 44.5 kg (98 lb)    Estimated body mass index is 17.92 kg/m  as calculated from the following:    Height as of 11/16/18: 1.575 m (5' 2\").    Weight as of 12/16/18: 44.5 kg (98 lb).     LDA:            Assessment:   ASA SCORE: 2       Documentation: H&P complete; Preop Testing complete; Consents complete   Proceeding: Proceed without further delay  Tobacco Use:  NO Active use of Tobacco/UNKNOWN Tobacco use status     Plan:   Anes. Type:  General   Pre-Induction: Midazolam IV; Acetaminophen PO   Induction:  IV (Standard)      Access/Monitoring: PIV   Maintenance: Balanced   Emergence: Procedure Site   Logistics: Same Day Surgery "     Postop Pain/Sedation Strategy:  Standard-Options: Opioids PRN     PONV Management:  Adult Risk Factors: Female, Non-Smoker, Postop Opioids       Comments for Plan/Consent:  Mask general anesthesia.                              Anthony Verma MD

## 2018-12-17 NOTE — PROGRESS NOTES
"Pt reported her goal for the evening was to eat her food, pt was able to achieve this goal. Pt was visible in the milieu for majority of shift and observed socializing w/peers intermittently. Pt reports she has ECT tomorrow morning and is worried about it because she \"always worries.\" Pt rated her anxiety 8/10, and attributes this to ECT tomorrow. Pt rated depression 5/10. Pt denies SI and SIB. Pt reports her appetite is still poor, but she has been trying to force herself to eat. Pt continues to obsess about the tidiness of her room, stating from the start of the shift she was very anxious about room checks \"I dont want them moving stuff around, otherwise I'll be up all night putting it back.\" Pt's hygiene is poor and she emits a strong body odor. PT was calm and cooperative w/staff. Pt had an uneventful shift.       12/16/18 2100   Behavioral Health   Hallucinations denies / not responding to hallucinations   Thinking paranoid   Orientation person: oriented;place: oriented;date: oriented;time: oriented   Memory baseline memory   Insight poor   Judgement impaired   Eye Contact at examiner   Affect blunted, flat   Mood mood is calm;anxious;depressed   Physical Appearance/Attire disheveled   Hygiene body odor;neglected grooming - unclean body, hair, teeth   Suicidality (Denies SI)   1. Wish to be Dead No   2. Non-Specific Active Suicidal Thoughts  No   Self Injury (Denies SIB)   Elopement (No elopment concerns)   Activity isolative;withdrawn   Speech clear;coherent   Medication Sensitivity no observed side effects;no stated side effects   Psychomotor / Gait balanced;steady   Activities of Daily Living   Hygiene/Grooming independent;prompts   Oral Hygiene independent;prompts   Dress independent;prompts   Laundry with supervision   Room Organization independent     " ----- Message from Madhavi Shaikh NP sent at 7/14/2017 12:42 PM CDT -----  Repeat blood sugar was normal at 82 and hgbA1c that looks back over 3 months was 6 which is consistent with prediabetes and no change from previous labs that we performed 2 years ago.

## 2018-12-17 NOTE — PROGRESS NOTES
Pt discharged from the recovery room via wheelchair back to station 10N with staff at 1130 .  Report given to station RNCira.               .

## 2018-12-17 NOTE — PROGRESS NOTES
"Pt had ECT treatment today.  Pt reports that it went well.  She did report feeling \"bryan woozie\".  Pt reports that she feels her mood has improved.  She reports her depression level is 5/10 and her anxiety level is 7-8/10 with 10 the most.  Pt denies any SI or SIB thinking.  Pt continues to refuse to shower.  Pt had only water and boost this shift.  Pt continues to have concerns that \"somthing could happen after my treatment\" but has been reassured and asks to be checked on.     12/17/18 8963   Behavioral Health   Hallucinations denies / not responding to hallucinations   Thinking distractable   Orientation person: oriented;place: oriented;date: oriented   Insight poor   Judgement impaired   Eye Contact at examiner   Affect blunted, flat  (brightens on approach)   Mood anxious   Physical Appearance/Attire attire appropriate to age and situation   Hygiene neglected grooming - unclean body, hair, teeth;body odor   Suicidality (denies SI)   1. Wish to be Dead No   2. Non-Specific Active Suicidal Thoughts  No   Elopement (none observed)   Activity isolative;withdrawn   Speech clear;coherent   Psychomotor / Gait balanced;steady   Activities of Daily Living   Hygiene/Grooming independent;prompts   Oral Hygiene independent;prompts   Dress street clothes;independent   Room Organization independent     "

## 2018-12-17 NOTE — PROCEDURES
Procedure/Surgery Information   [facility], [place]    Bedside Procedure Note  Date of Service (when I performed the procedure): 12/17/2018    Gwen Cash is a 53 year old female patient.  1. Major depressive disorder, remission status unspecified, unspecified whether recurrent    2. Severe recurrent major depression without psychotic features (H)      Past Medical History:   Diagnosis Date     Calculus of right kidney 1997    had hydronephrosis & treated for mild corinna at that time, calcium phosphate stone s/p lithotripsy      Chronic depressive personality disorder      DUB (dysfunctional uterine bleeding) 2004    work up including tsh ,labs, ecc , gyn eval normal      H/O echocardiogram 11/2011    at [facility] was normal , no valvular issues, done for functional benign murmur noted in past      History of Papanicolaou smear of cervix 9/14/01, 6/21/04    NIL     OCD (obsessive compulsive disorder) 3/4/2017     Panic disorder without agoraphobia 3/4/2017     Tobacco use disorder 3/4/2017     Temp: 98.5  F (36.9  C) Temp src: Tympanic       Resp: 16 SpO2: 100 %        Procedures     Melo Wang     [facility], [place]   ECT Procedure Note   12/17/2018    Gwen Cash [id number]   53 year old 1965     Patient Status: Inpatient    Is this the first in a series of 12 treatments?  No    History and Physical: Reviewed in medical record    Consent: Informed consent was signed by: patient    Date Consent Signed: 12/7/18      Allergies   Allergen Reactions     Metronidazole Unknown     Abstracted data , patient cannot remember      No Clinical Screening - See Comments Hives     Penicillins Unknown     Abstracted data , patient cannot remember      Phenylbutazones        Weight:  98 lbs 0 oz              Indications for ECT:   Medications ineffective         Clinical Narrative:   Patient was admitted with severe depression and OCD.   She's continuing  ECT for depression.  NPO after 2400.  Alert and Oriented x 3.   She skipped ECT on 12/14/18.  She is very anxious.  Not super depressed.            Diagnosis:   Major depression         Pause for the Cause:     Right patient Yes   Right procedure/laterality settings: Yes          Intra-Procedure Documentation:       ECT #: 4   Treatment number this series: 4   Total treatment number: 4     Type of ECT:  Right, unilateral ultrabrief    ECT Medications:   Lidocaine:  40mg   Brevital: 60mg  Succinyl Choline: 60mg    ECT Strip Summary:   Energy Level: 40 percent  Motor Seizure Duration: 29 seconds  EEG Seizure Duration:  42 seconds    Complications: No    Plan:   Next ECT 12/19/18    Melo Wang MD

## 2018-12-18 PROCEDURE — 25000132 ZZH RX MED GY IP 250 OP 250 PS 637

## 2018-12-18 PROCEDURE — 99232 SBSQ HOSP IP/OBS MODERATE 35: CPT | Performed by: PSYCHIATRY & NEUROLOGY

## 2018-12-18 PROCEDURE — 12400007 ZZH R&B MH INTERMEDIATE UMMC

## 2018-12-18 PROCEDURE — 99207 ZZC CDG-MDM COMPONENT: MEETS MODERATE - UP CODED: CPT | Performed by: PSYCHIATRY & NEUROLOGY

## 2018-12-18 PROCEDURE — 25000132 ZZH RX MED GY IP 250 OP 250 PS 637: Performed by: PSYCHIATRY & NEUROLOGY

## 2018-12-18 PROCEDURE — 25000132 ZZH RX MED GY IP 250 OP 250 PS 637: Performed by: PHYSICIAN ASSISTANT

## 2018-12-18 PROCEDURE — 25000132 ZZH RX MED GY IP 250 OP 250 PS 637: Performed by: EMERGENCY MEDICINE

## 2018-12-18 RX ADMIN — Medication 40 MG: at 14:17

## 2018-12-18 RX ADMIN — Medication 500 MG: at 14:17

## 2018-12-18 RX ADMIN — BUPROPION HYDROCHLORIDE 100 MG: 100 TABLET, FILM COATED ORAL at 14:17

## 2018-12-18 RX ADMIN — MELATONIN TAB 3 MG 3 MG: 3 TAB at 22:34

## 2018-12-18 RX ADMIN — CLONAZEPAM 0.5 MG: 0.5 TABLET ORAL at 14:17

## 2018-12-18 RX ADMIN — LEVOTHYROXINE SODIUM 25 MCG: 25 TABLET ORAL at 09:17

## 2018-12-18 ASSESSMENT — ACTIVITIES OF DAILY LIVING (ADL)
ORAL_HYGIENE: PROMPTS
ORAL_HYGIENE: PROMPTS
HYGIENE/GROOMING: PROMPTS
DRESS: STREET CLOTHES;INDEPENDENT
LAUNDRY: WITH SUPERVISION
DRESS: PROMPTS
HYGIENE/GROOMING: PROMPTS

## 2018-12-18 ASSESSMENT — MIFFLIN-ST. JEOR: SCORE: 988.25

## 2018-12-18 NOTE — PLAN OF CARE
BEHAVIORAL TEAM DISCUSSION    Participants: Wei Powell MD, Janey FLORES and Cira APPLE RN  Progress: Pt completed ECT #4 yesterday, showing some improvement.   Continued Stay Criteria/Rationale: Pt will continue with inpatient ECT.   Medical/Physical: See medical consult if applicable   Precautions:   Behavioral Orders   Procedures    Code 1 - Restrict to Unit    Code 2     Only during ECT days (Mon, Wed, Fri)    Electroconvulsive therapy     ECT every Monday, Wednesday, and Friday    Electroconvulsive therapy     Series of up to 12 treatments. Begin Date: 12/7/18     Treating Psychiatrist providing ECT:  Kathleen     Notified on:  12/5/18    Fall precautions    Routine Programming     As clinically indicated    Status 15     Every 15 minutes.     Plan: Will continue with inpatient ECT, will monitor improvement.   Rationale for change in precautions or plan: Continue with ECT plan of care.

## 2018-12-18 NOTE — PROGRESS NOTES
SPIRITUAL HEALTH SERVICES  SPIRITUAL ASSESSMENT Progress Note  [facility] ([facility]) 10N     REFERRAL SOURCE: Patient/Staff    I introduced myself and [facility]. Vidya told me she did not request to see a  and didn't feel the need to talk with me.     PLAN: [facility] will continue to provide spiritual support while Vidya is in the hospital.    Deepa Sotelo  Chaplain Resident  Pager [phone]

## 2018-12-18 NOTE — PLAN OF CARE
"48 hour nursing assessment:    Patient had ECT #4 today. She has been visible in the milieu 60% of the shift. Her affect is flat but brightens upon approach. She did eat 100% of her dinner tonight. She stated her goal today was to \"make sure I ate all my dinner\". This goal was met. She denies SI and SIB. She denies AH and VH. She states her mood and concentration are \"okay\". She denies racing thoughts.  She states she believes her medications are working. She endorses feeling safe and hopeful. Her coping skills are \"talking with someone\". She rates her depression a 5 and anxiety a 7-8. She denies pain. Her sleep is good but states she still does not have a good appetite. She has not showered for 2 months now. She is wearing the same clothing that she was admitted in and they have not been washed. She did not attend any groups today.She has no concerns.  Mood Impairment (Depressive Signs/Symptoms) (Adult)  Improved Mood Symptoms (Depressive Signs/Symptoms)  12/17/2018 2103 - No Change by Meka Day RN     Decreased Participation/Engagement (Depressive Signs/Symptoms) (Adult)  Increased Participation/Engagement (Depressive Signs/Symptoms)  12/17/2018 2103 - No Change by Meka Day RN     Behavior Regulation (Impulse Control) Impairment (Obsessive-Compulsive Signs/Symptoms) (Adult)  Improved Behavior Regulation and Impulse Control (Obsessive-Compulsive Signs/Symptoms)  12/17/2018 2103 - No Change by Meka Day RN     Emotion/Mood Impairment (Obsessive-Compulsive Signs/Symptoms) (Adult)  Improved Mood Symptoms (Obsessive-Compulsive Signs/Symptoms)  12/17/2018 2103 - No Change by Meka Day RN     "

## 2018-12-18 NOTE — PHARMACY-ADMISSION MEDICATION HISTORY
Admission medication history for the December 18, 2018 admission is complete.     Interview sources:  Patient    Reliability of source: Good    Medication compliance: Good    Preferred Outpatient Pharmacy: [facility] in [place]    Changes made to PTA medication list (reason)  Added: none  Deleted: none  Changed: none    Additional medication history information:   None    Prior to Admission Medication List:  Prior to Admission medications    Medication Sig Last Dose Taking? Auth Provider   clonazePAM (KLONOPIN) 0.5 MG tablet Take 1 tablet (0.5 mg) by mouth daily 11/15/2018 at pm Yes Graciela Koenig MD   PARoxetine (PAXIL) 20 MG tablet Take 1 tablet (20 mg) by mouth At Bedtime 11/15/2018 at pm Yes Graciela Koenig MD       Time spent: 10 minutes    Medication history completed by:   Don Singh, Pharmacy Student

## 2018-12-18 NOTE — PROGRESS NOTES
[facility], [place]   Psychiatric Progress Note      Impression:   Gwen Cash is a 53 year old female admitted for severe depression and weight loss of 30 lbs. We started ECT on 12/7 to improve severe anxiety, OCD.  We are adjusting medications to target mood.  We are also working with the patient on therapeutic skill building.             Diagnoses and Plan:       Principal Diagnosis: MDD, recurrent, severe without psychotic features. OCD  Unit: station 10  Attending: Santana  Medications: risks/benefits discussed with patient-    - Paxil  40mg daily for compulsive behaviors, anxiety, depression  - Wellbutrin 100mg AM   -klonopin 0.5mg HS   -synthroid 25 mcg daily  - N-Acetyl cysteine 500mg daily  Laboratory/Imaging:  - Upreg neg, CBC wnl and BMP WNL  Consults:  -  Internal medicine, to evaluate synthroid dose.  Patient will be treated in therapeutic milieu with appropriate individual and group therapies as described.     ECT #4 performed today.      Medical diagnoses to be addressed this admission:   Weight loss of 30 lbs  Hypothyroidism      Legal Status: Voluntary      Safety Assessment:   Checks: Status 15  Precautions: Suicide  Pt has not required locked seclusion or restraints in the past 24 hours to maintain safety, please refer to RN documentation for further details.    The risks, benefits, alternatives and side effects have been discussed and are understood by the patient and other caregivers.     Target symptoms to stabilize: SI, depressed, neurovegetative symptoms, sleep issues and disordered eating  Target disposition: outpatient provider        Attestation:  Patient has been seen and evaluated by me,  Wei Powell MD          Interim History:   The patient's care was discussed with the treatment team and chart notes were reviewed.    5 family members came to the unit for pt, for emotional support, as today was ECT day. Pt received #4 ECT today. Afterwards, she had  "some fatigue and rested. She still has not taken shower at all since admitted but her sister plans on helping her with showering.   She continues to worry about the weight loss and not being able to gain weight back quickly. Family thinks there is some slight improvement relative to her presentation relative to before.     In team meeting, staff reported that last evening, she was seen out of her room more that she has been.     The 10 point Review of Systems is negative other than noted in the HPI         Medications:       acetylcysteine  500 mg Oral Daily     buPROPion  100 mg Oral Daily     clonazePAM  0.5 mg Oral Daily     influenza vaccine adult (product based on age)  0.5 mL Intramuscular Prior to discharge     levothyroxine  25 mcg Oral QAM AC     PAXIL  40 mg Oral Daily with lunch             Allergies:     Allergies   Allergen Reactions     Metronidazole Unknown     Abstracted data , patient cannot remember      No Clinical Screening - See Comments Hives     Penicillins Unknown     Abstracted data , patient cannot remember      Phenylbutazones             Psychiatric Examination:   /75   Pulse 84   Temp 97.5  F (36.4  C)   Resp 16   Ht 1.575 m (5' 2\")   Wt 44.5 kg (98 lb)   SpO2 97%   BMI 17.92 kg/m    Weight is 98 lbs 0 oz  Body mass index is 17.92 kg/m .    Appearance:  awake, alert, appeared as age stated, cooperative, moderate distress and casually dressed  Attitude:  cooperative  Eye Contact:  fair  Mood:  anxious and depressed  Affect:  constricted mobility  Speech:  clear, coherent  Psychomotor Behavior:  no evidence of tardive dyskinesia, dystonia, or tics and intact station, gait and muscle tone  Thought Process:  linear  Associations:  no loose associations  Thought Content:  no evidence of suicidal ideation or homicidal ideation, no evidence of psychotic thought, no auditory hallucinations present, no visual hallucinations present and obsessions present  Insight:  limited  Judgment: "  limited  Oriented to:  time, person, and place  Attention Span and Concentration:  limited  Recent and Remote Memory:  fair  Language: Able to name objects  Fund of Knowledge: appropriate  Muscle Strength and Tone: normal  Gait and Station: Normal         Labs:   No results found for this or any previous visit (from the past 24 hour(s)).       Wei Powell MD  [facility]

## 2018-12-18 NOTE — PROGRESS NOTES
Writer rescheduled the following psychiatric medication management appointment for Pt:  - Medication Management:   Mercedes Aguero DNP  [facility]  [address] [place] [address] [address]  [place] [address]  Phone:  [phone]  Fax:  [phone] HUC PLEASE FAX DISCHARGE INSTRUCTIONS   *You have a psychiatric medication management with Dr. Mercedes Aguero DNP scheduled for Thursday, January 10th @ 1:30 pm

## 2018-12-18 NOTE — PROGRESS NOTES
"Pt continues to not shower or brush her teeth. Pt has not eaten today, only drank a boost in the afternoon. Pt is anxious about another ECT tomorrow. Pt has not attended any groups. Pt said she's feeling \"better\" and is still hopeful; \"I have to be\". Pt denies SI and SIB thoughts.     12/18/18 1434   Behavioral Health   Hallucinations denies / not responding to hallucinations   Thinking (SARI)   Orientation person: oriented;place: oriented;time: oriented   Memory baseline memory   Insight poor   Judgement impaired   Eye Contact at examiner   Affect (neutral)   Mood depressed;anxious   Physical Appearance/Attire untidy;disheveled;appears stated age   Hygiene neglected grooming - unclean body, hair, teeth;body odor   Suicidality (denies- \"never\")   1. Wish to be Dead No   2. Non-Specific Active Suicidal Thoughts  No   Self Injury (denies)   Elopement (WDL) WDL   Activity isolative;withdrawn   Speech clear;coherent   Medication Sensitivity no stated side effects;no observed side effects   Psychomotor / Gait balanced;steady;slow   Coping/Psychosocial   Verbalized Emotional State hopefulness;disbelief   Safety   Suicidality Status 15   Activities of Daily Living   Hygiene/Grooming prompts   Oral Hygiene prompts   Dress prompts   Laundry with supervision   Room Organization independent     "

## 2018-12-19 ENCOUNTER — ANESTHESIA (OUTPATIENT)
Dept: BEHAVIORAL HEALTH | Facility: CLINIC | Age: 53
End: 2018-12-19

## 2018-12-19 ENCOUNTER — ANESTHESIA EVENT (OUTPATIENT)
Dept: BEHAVIORAL HEALTH | Facility: CLINIC | Age: 53
End: 2018-12-19

## 2018-12-19 PROCEDURE — 12400007 ZZH R&B MH INTERMEDIATE UMMC

## 2018-12-19 PROCEDURE — 25000132 ZZH RX MED GY IP 250 OP 250 PS 637: Performed by: PHYSICIAN ASSISTANT

## 2018-12-19 PROCEDURE — 25000128 H RX IP 250 OP 636: Performed by: ANESTHESIOLOGY

## 2018-12-19 PROCEDURE — 25000132 ZZH RX MED GY IP 250 OP 250 PS 637: Performed by: PSYCHIATRY & NEUROLOGY

## 2018-12-19 PROCEDURE — 25000125 ZZHC RX 250: Performed by: ANESTHESIOLOGY

## 2018-12-19 PROCEDURE — 90870 ELECTROCONVULSIVE THERAPY: CPT

## 2018-12-19 PROCEDURE — 25000132 ZZH RX MED GY IP 250 OP 250 PS 637

## 2018-12-19 PROCEDURE — 25000132 ZZH RX MED GY IP 250 OP 250 PS 637: Performed by: EMERGENCY MEDICINE

## 2018-12-19 PROCEDURE — 25000125 ZZHC RX 250: Performed by: PSYCHIATRY & NEUROLOGY

## 2018-12-19 RX ORDER — LIDOCAINE HYDROCHLORIDE 20 MG/ML
40 INJECTION, SOLUTION EPIDURAL; INFILTRATION; INTRACAUDAL; PERINEURAL
Status: COMPLETED | OUTPATIENT
Start: 2018-12-19 | End: 2018-12-19

## 2018-12-19 RX ADMIN — LIDOCAINE HYDROCHLORIDE 40 MG: 20 INJECTION, SOLUTION EPIDURAL; INFILTRATION; INTRACAUDAL; PERINEURAL at 12:11

## 2018-12-19 RX ADMIN — LEVOTHYROXINE SODIUM 25 MCG: 25 TABLET ORAL at 13:30

## 2018-12-19 RX ADMIN — CLONAZEPAM 0.5 MG: 0.5 TABLET ORAL at 13:58

## 2018-12-19 RX ADMIN — Medication 500 MG: at 13:58

## 2018-12-19 RX ADMIN — METHOHEXITAL SODIUM 60 MG: 500 INJECTION, POWDER, LYOPHILIZED, FOR SOLUTION INTRAMUSCULAR; INTRAVENOUS; RECTAL at 12:05

## 2018-12-19 RX ADMIN — Medication 60 MG: at 12:05

## 2018-12-19 RX ADMIN — BUPROPION HYDROCHLORIDE 100 MG: 100 TABLET, FILM COATED ORAL at 13:58

## 2018-12-19 RX ADMIN — MELATONIN TAB 3 MG 3 MG: 3 TAB at 22:41

## 2018-12-19 RX ADMIN — Medication 40 MG: at 13:58

## 2018-12-19 ASSESSMENT — ACTIVITIES OF DAILY LIVING (ADL)
ORAL_HYGIENE: INDEPENDENT
LAUNDRY: UNABLE TO COMPLETE
DRESS: INDEPENDENT
HYGIENE/GROOMING: INDEPENDENT

## 2018-12-19 ASSESSMENT — LIFESTYLE VARIABLES: TOBACCO_USE: 1

## 2018-12-19 NOTE — ANESTHESIA CARE TRANSFER NOTE
Patient: Gwen Cash    * No procedures listed *    Diagnosis: * No pre-op diagnosis entered *  Diagnosis Additional Information: No value filed.    Anesthesia Type:   General     Note:  Airway :Room Air  Patient transferred to:PACU  Handoff Report: Identifed the Patient, Identified the Reponsible Provider, Reviewed the pertinent medical history, Discussed the surgical course, Reviewed Intra-OP anesthesia mangement and issues during anesthesia, Set expectations for post-procedure period and Allowed opportunity for questions and acknowledgement of understanding      Vitals: (Last set prior to Anesthesia Care Transfer)    CRNA VITALS  12/19/2018 1147 - 12/19/2018 1217      12/19/2018             Pulse:  103    Ht Rate:  103    SpO2:  100 %    Resp Rate (set):  8                Electronically Signed By: ASTRID Pardo CRNA  December 19, 2018  12:17 PM

## 2018-12-19 NOTE — PROGRESS NOTES
Procedure/Surgery Information   [facility], [place]    Bedside Procedure Note  Date of Service (when I performed the procedure): 12/19/2018    Gwen Cash is a 53 year old female patient.  No diagnosis found.  Past Medical History:   Diagnosis Date     Calculus of right kidney 1997    had hydronephrosis & treated for mild corinna at that time, calcium phosphate stone s/p lithotripsy      Chronic depressive personality disorder      DUB (dysfunctional uterine bleeding) 2004    work up including tsh ,labs, ecc , gyn eval normal      H/O echocardiogram 11/2011    at [facility] was normal , no valvular issues, done for functional benign murmur noted in past      History of Papanicolaou smear of cervix 9/14/01, 6/21/04    NIL     OCD (obsessive compulsive disorder) 3/4/2017     Panic disorder without agoraphobia 3/4/2017     Tobacco use disorder 3/4/2017                          Procedures     Melo Wang     [facility], [place]   ECT Procedure Note   12/19/2018    Gwen Cash [id number]   53 year old 1965     Patient Status: Inpatient    Is this the first in a series of 12 treatments?  No    History and Physical: Reviewed in medical record    Consent: Informed consent was signed by: patient    Date Consent Signed: 12/7/18      Allergies   Allergen Reactions     Metronidazole Unknown     Abstracted data , patient cannot remember      No Clinical Screening - See Comments Hives     Penicillins Unknown     Abstracted data , patient cannot remember      Phenylbutazones        Weight:  0 lbs 0 oz              Indications for ECT:   Medications ineffective         Clinical Narrative:   Patient was admitted with severe depression and OCD.   She's continuing ECT for depression.  NPO after 2400.  Alert and Oriented x 3.   Last ECT was 12/17/18.              Diagnosis:   Major depression         Pause for the Cause:     Right patient Yes   Right  procedure/laterality settings: Yes          Intra-Procedure Documentation:       ECT #: 5   Treatment number this series: 5   Total treatment number: 5     Type of ECT:  Right, unilateral ultrabrief    ECT Medications:   Lidocaine:  40mg   Brevital: 60mg  Succinyl Choline: 60mg    ECT Strip Summary:   Energy Level: 45 percent  Motor Seizure Duration:  23 seconds  EEG Seizure Duration:   37 seconds    Complications: No    Plan:   Next ECT 12/21/18    Melo Wang MD

## 2018-12-19 NOTE — ANESTHESIA POSTPROCEDURE EVALUATION
Anesthesia POST Procedure Evaluation    Patient: Gwen Cash   MRN:     [id number] Gender:   female   Age:    53 year old :      1965        Preoperative Diagnosis: * No pre-op diagnosis entered *   * No procedures listed *   Postop Comments: No value filed.       Anesthesia Type:  General    Reportable Event: NO     PAIN: Uncomplicated   Sign Out status: Comfortable, Well controlled pain     PONV: No PONV   Sign Out status:  No Nausea or Vomiting     Neuro/Psych: Uneventful perioperative course   Sign Out Status: Preoperative baseline; Age appropriate mentation     Airway/Resp.: Uneventful perioperative course   Sign Out Status: Non labored breathing, age appropriate RR; Resp. Status within EXPECTED Parameters     CV: Uneventful perioperative course   Sign Out status: Appropriate BP and perfusion indices; Appropriate HR/Rhythm     Disposition:   Sign Out in:  PACU  Disposition:  Phase II; Home  Recovery Course: Uneventful  Follow-Up: Not required           Last Anesthesia Record Vitals:      Last PACU/Preop Vitals:  Vitals:    18 1611 18 1258 18 0900   BP:   96/62   Pulse:   85   Resp: 16 16 16   Temp: 36.4  C (97.5  F) 37.4  C (99.3  F) 37.1  C (98.7  F)   SpO2:   97%         Electronically Signed By: Lakhwinder Pinto MD, MD, 2018, 12:06 PM

## 2018-12-19 NOTE — PROGRESS NOTES
[facility], [place]   Psychiatric Progress Note      Impression:   Gwen Cash is a 53 year old female admitted for severe depression and weight loss of 30 lbs. We started ECT on 12/7 to improve severe anxiety, OCD.  We are adjusting medications to target mood.  We are also working with the patient on therapeutic skill building.             Diagnoses and Plan:       Principal Diagnosis: MDD, recurrent, severe without psychotic features. OCD  Unit: station 10  Attending: Wei Powell MD  Medications: risks/benefits discussed with patient-    - Paxil  40mg daily for compulsive behaviors, anxiety, depression  - Wellbutrin 100mg AM   -klonopin 0.5mg HS   -synthroid 25 mcg daily  - N-Acetyl cysteine 500mg daily    Laboratory/Imaging:  - Upreg neg, CBC wnl and BMP WNL    Consults:  -  Internal medicine, to evaluate synthroid dose.  Patient will be treated in therapeutic milieu with appropriate individual and group therapies as described.     ECT #4 performed on 12/17     Medical diagnoses to be addressed this admission:   Weight loss of 30 lbs  Hypothyroidism      Legal Status: Voluntary      Safety Assessment:   Checks: Status 15  Precautions: Suicide  Pt has not required locked seclusion or restraints in the past 24 hours to maintain safety, please refer to RN documentation for further details.    The risks, benefits, alternatives and side effects have been discussed and are understood by the patient and other caregivers.     Target symptoms to stabilize: SI, depressed, neurovegetative symptoms, sleep issues and disordered eating  Target disposition: outpatient provider        Attestation:  Patient has been seen and evaluated by me,  Wei Powell MD          Interim History:       As per nursing/psych associate note: Pt continues to not shower or brush her teeth. Pt has not eaten today, only drank a boost in the afternoon. Pt is anxious about another ECT tomorrow. Pt has not  "attended any groups. Pt said she's feeling \"better\" and is still hopeful; \"I have to be\". Pt denies SI and SIB thoughts.    As per today's interview: Some mild improvement in mood, obsessive thoughts. Some improvement in appetite. Seen outside of room more. Denies current SI, intent or plan. Willing to continue with ECT tomorrow.         The 10 point Review of Systems is negative other than noted in the HPI         Medications:       acetylcysteine  500 mg Oral Daily     buPROPion  100 mg Oral Daily     clonazePAM  0.5 mg Oral Daily     influenza vaccine adult (product based on age)  0.5 mL Intramuscular Prior to discharge     levothyroxine  25 mcg Oral QAM AC     PAXIL  40 mg Oral Daily with lunch             Allergies:     Allergies   Allergen Reactions     Metronidazole Unknown     Abstracted data , patient cannot remember      No Clinical Screening - See Comments Hives     Penicillins Unknown     Abstracted data , patient cannot remember      Phenylbutazones             Psychiatric Examination:   /75   Pulse 84   Temp 99.3  F (37.4  C) (Tympanic)   Resp 16   Ht 1.575 m (5' 2\")   Wt 43.5 kg (95 lb 14.4 oz)   SpO2 97%   BMI 17.54 kg/m    Weight is 95 lbs 14.4 oz  Body mass index is 17.54 kg/m .    Appearance:  awake, alert, appeared as age stated, cooperative, moderate distress and casually dressed  Attitude:  cooperative  Eye Contact:  fair  Mood:  anxious and depressed  Affect:  constricted mobility  Speech:  clear, coherent  Psychomotor Behavior:  no evidence of tardive dyskinesia, dystonia, or tics and intact station, gait and muscle tone  Thought Process:  linear  Associations:  no loose associations  Thought Content:  no evidence of suicidal ideation or homicidal ideation, no evidence of psychotic thought, no auditory hallucinations present, no visual hallucinations present and obsessions present  Insight:  limited  Judgment:  limited  Oriented to:  time, person, and place  Attention Span and " Concentration:  limited  Recent and Remote Memory:  fair  Language: Able to name objects  Fund of Knowledge: appropriate  Muscle Strength and Tone: normal  Gait and Station: Normal         Labs:   No results found for this or any previous visit (from the past 24 hour(s)).       Wei Powell MD  [facility]

## 2018-12-19 NOTE — PROGRESS NOTES
"Pt was visible in the milieu for about 50% of the shift, she was observed watching TV in the lounge with peers, sitting in the dinning room, or sitting in her room. Pt's overall affect was blunted/flat and at times sad. Pt's mood was depressed and anxious. Pt endorsed feelings of depression and anxiety she rated depression 5/10 and anxiety 7-8/10. Pt denied SI, SIB, AH and VH. Pt's appetite was \"still the same, I don't feel very hungry\". Pt did eat all of her dinner this evening. Pt still has not showered, when writer brought the topic up pt became tearful and said \"I know I need to do it, and I am working on a plan with my sister, but my mom got a head injury so she hasn't been able to help me. I know I need to shower though\". Overall pt had an ok shift, there were no major concerns.        12/18/18 2206   Behavioral Health   Hallucinations denies / not responding to hallucinations   Thinking distractable   Orientation person: oriented;place: oriented   Insight poor   Judgement impaired   Eye Contact at examiner   Affect blunted, flat;sad   Mood depressed;anxious   Physical Appearance/Attire posture slouched;disheveled;attire appropriate to age and situation   Hygiene neglected grooming - unclean body, hair, teeth;body odor   Suicidality (Denies)   1. Wish to be Dead No   2. Non-Specific Active Suicidal Thoughts  No   Self Injury other (see comment)  (Denies, none observed)   Elopement (No statements made, none observed)   Activity isolative;withdrawn   Speech clear;coherent   Psychomotor / Gait balanced;steady   Activities of Daily Living   Hygiene/Grooming prompts   Oral Hygiene prompts   Dress street clothes;independent   Room Organization independent     "

## 2018-12-19 NOTE — PLAN OF CARE
Mood Impairment (Depressive Signs/Symptoms) (Adult)  Improved Mood Symptoms (Depressive Signs/Symptoms)  12/19/2018 1600 - Improving by Cira Santillan RN   Patient had ECT #5 today. Returned without incident, VSS, denies pain, nausea. Was able to take scheduled medications, drink some milk and eat coffee cake with her family. States her appetite is poor and she is concerned about her weight, does not want to lose any more. States multiple people have told her she looks happier since receiving ECT treatments but she doesn't notice a difference in herself. Continues to be extremely nervous about the procedure and was tearful prior. Rates anxiety at a 7-8/10 and depression at 4-5/10. Does appear to smile more and laughed a few times during our interaction. Patient denies suicidal ideation, plan, intent. She hopes she will begin to notice an improvement in her mood after the next ECT treatment.

## 2018-12-19 NOTE — ANESTHESIA PREPROCEDURE EVALUATION
Anesthesia Pre-Procedure Evaluation    Patient: Gwen Cash   MRN:     [id number] Gender:   female   Age:    53 year old :      1965        Preoperative Diagnosis: * No pre-op diagnosis entered *   * No procedures listed *     Past Medical History:   Diagnosis Date     Calculus of right kidney     had hydronephrosis & treated for mild corinna at that time, calcium phosphate stone s/p lithotripsy      Chronic depressive personality disorder      DUB (dysfunctional uterine bleeding)     work up including tsh ,labs, ecc , gyn eval normal      H/O echocardiogram 2011    at [facility] was normal , no valvular issues, done for functional benign murmur noted in past      History of Papanicolaou smear of cervix 01, 04    NIL     OCD (obsessive compulsive disorder) 3/4/2017     Panic disorder without agoraphobia 3/4/2017     Tobacco use disorder 3/4/2017      Past Surgical History:   Procedure Laterality Date      SECTION      x 3     LITHOTRIPSY            Anesthesia Evaluation     . Pt has had prior anesthetic. Type: General and Regional    No history of anesthetic complications          ROS/MED HX    ENT/Pulmonary:     (+)tobacco use, , . .    Neurologic:  - neg neurologic ROS     Cardiovascular:  - neg cardiovascular ROS   (+) ----. : . . . :. . Previous cardiac testing date:results:date: results:ECG reviewed date: results:NSR. date: results:          METS/Exercise Tolerance:  >4 METS   Hematologic:  - neg hematologic  ROS       Musculoskeletal:  - neg musculoskeletal ROS       GI/Hepatic:  - neg GI/hepatic ROS       Renal/Genitourinary:     (+) Nephrolithiasis ,       Endo:  - neg endo ROS       Psychiatric: Comment: Severe anxiety and OCD.    (+) psychiatric history anxiety and other (comment)      Infectious Disease:  - neg infectious disease ROS       Malignancy:         Other:                         PHYSICAL EXAM:   Mental Status/Neuro: A/A/O   Airway: Facies:  "Feasible  Mallampati: I  Mouth/Opening: Full  TM distance: > 6 cm  Neck ROM: Full   Respiratory: Auscultation: CTAB     Resp. Rate: Normal     Resp. Effort: Normal      CV: Rhythm: Regular  Rate: Age appropriate  Heart: Normal Sounds   Comments:      Dental: Normal                  Lab Results   Component Value Date    WBC 7.8 11/16/2018    HGB 15.9 (H) 11/16/2018    HCT 46.3 11/16/2018     11/16/2018     11/29/2018    POTASSIUM 4.4 11/29/2018    CHLORIDE 102 11/29/2018    CO2 30 11/29/2018    BUN 20 11/29/2018    CR 0.74 11/29/2018    GLC 90 11/29/2018    JOSE 9.6 11/29/2018    PHOS 3.4 11/29/2018    MAG 2.2 11/29/2018    ALBUMIN 4.9 11/16/2018    PROTTOTAL 8.3 11/16/2018    ALT 17 11/16/2018    AST 11 11/16/2018    ALKPHOS 74 11/16/2018    BILITOTAL 0.4 11/16/2018    TSH 5.89 (H) 11/16/2018    T4 0.93 11/16/2018    HCG Negative 11/16/2018       Preop Vitals  BP Readings from Last 3 Encounters:   12/19/18 96/62   03/03/17 126/84    Pulse Readings from Last 3 Encounters:   12/19/18 85   03/03/17 99      Resp Readings from Last 3 Encounters:   12/19/18 16   03/03/17 16    SpO2 Readings from Last 3 Encounters:   12/19/18 97%   03/03/17 96%      Temp Readings from Last 1 Encounters:   12/19/18 37.1  C (98.7  F) (Tympanic)    Ht Readings from Last 1 Encounters:   11/16/18 1.575 m (5' 2\")      Wt Readings from Last 1 Encounters:   12/18/18 43.5 kg (95 lb 14.4 oz)    Estimated body mass index is 17.54 kg/m  as calculated from the following:    Height as of this encounter: 1.575 m (5' 2\").    Weight as of this encounter: 43.5 kg (95 lb 14.4 oz).     LDA:            Assessment:   ASA SCORE: 2       Documentation: H&P complete; Preop Testing complete; Consents complete   Proceeding: Proceed without further delay  Tobacco Use:  NO Active use of Tobacco/UNKNOWN Tobacco use status     Plan:   Anes. Type:  General   Pre-Induction: Midazolam IV; Acetaminophen PO   Induction:  IV (Standard)   Airway: Oral ETT "   Access/Monitoring: PIV   Maintenance: Balanced   Emergence: Procedure Site   Logistics: Same Day Surgery     Postop Pain/Sedation Strategy:  Standard-Options: Opioids PRN     PONV Management:  Adult Risk Factors: Female, Non-Smoker, Postop Opioids                         Lakhwinder Pinto MD, MD

## 2018-12-20 PROCEDURE — 25000132 ZZH RX MED GY IP 250 OP 250 PS 637

## 2018-12-20 PROCEDURE — 99207 ZZC CDG-MDM COMPONENT: MEETS MODERATE - UP CODED: CPT | Performed by: PSYCHIATRY & NEUROLOGY

## 2018-12-20 PROCEDURE — 25000132 ZZH RX MED GY IP 250 OP 250 PS 637: Performed by: PSYCHIATRY & NEUROLOGY

## 2018-12-20 PROCEDURE — 99232 SBSQ HOSP IP/OBS MODERATE 35: CPT | Performed by: PSYCHIATRY & NEUROLOGY

## 2018-12-20 PROCEDURE — 25000132 ZZH RX MED GY IP 250 OP 250 PS 637: Performed by: PHYSICIAN ASSISTANT

## 2018-12-20 PROCEDURE — 12400007 ZZH R&B MH INTERMEDIATE UMMC

## 2018-12-20 PROCEDURE — 25000132 ZZH RX MED GY IP 250 OP 250 PS 637: Performed by: EMERGENCY MEDICINE

## 2018-12-20 RX ADMIN — LEVOTHYROXINE SODIUM 25 MCG: 25 TABLET ORAL at 09:28

## 2018-12-20 RX ADMIN — BUPROPION HYDROCHLORIDE 100 MG: 100 TABLET, FILM COATED ORAL at 14:04

## 2018-12-20 RX ADMIN — Medication 40 MG: at 14:04

## 2018-12-20 RX ADMIN — CLONAZEPAM 0.5 MG: 0.5 TABLET ORAL at 14:04

## 2018-12-20 RX ADMIN — MELATONIN TAB 3 MG 3 MG: 3 TAB at 22:34

## 2018-12-20 RX ADMIN — Medication 500 MG: at 14:04

## 2018-12-20 ASSESSMENT — ACTIVITIES OF DAILY LIVING (ADL)
HYGIENE/GROOMING: PROMPTS
ORAL_HYGIENE: INDEPENDENT;PROMPTS
ORAL_HYGIENE: PROMPTS
DRESS: INDEPENDENT
HYGIENE/GROOMING: INDEPENDENT;PROMPTS
DRESS: STREET CLOTHES

## 2018-12-20 ASSESSMENT — MIFFLIN-ST. JEOR: SCORE: 995.51

## 2018-12-20 NOTE — PROGRESS NOTES
CLINICAL NUTRITION SERVICES - REASSESSMENT NOTE     Nutrition Prescription    RECOMMENDATIONS FOR MDs/PROVIDERS TO ORDER:  Strongly recommend trialing appetite stimulant given continuation of poor appetite for prolonged period of time, significant wt loss over past year, and difficulty putting on wt this admission despite intakes.  Please continue to encourage po intakes and goal of 3 Boost/day.    Malnutrition Status:    Non-severe malnutrition in the context of environmental or social circumstances    Recommendations already ordered by Registered Dietitian (RD):  Continue Boost TID with meals as ordered  Continue Magic Cup with lunch and gelatein with dinner    Future/Additional Recommendations:  Monitor po intakes and wt trends. Consider need to add/discontinue supplement or adjust flavors pending trends and pt preferences.    Continue to encourage Boost 3/day.      EVALUATION OF THE PROGRESS TOWARD GOALS   Diet: Regular  Snacks/supplements: Magic Cup (moose) with lunch, Gelatein (cherry) with dinner; Boost Plus TID (strawberry with breakfast & dinner, moose with lunch)  Intake: Primarily eating most of lunch and dinner meals. Pt usually skips breakfast. Some variable intakes recorded in notes. Pt reports her appetite as still poor and she is forcing herself to eat. Drinking 2 Boost/day + eating Magic Cup and gelatein supplements.       NEW FINDINGS   - Wt: Wt down 3 lb (3%) over the past 9 days. Underweight BMI category. Pt had reported UBW of 125 lb. Per previous assessment, She states that in Jan/Feb, she went to the doctor and was 119 lbs. This is when she realized she was losing weight. She feels weight loss has been more rapid in the past few months. No recent wt history available in [facility]Everywhere, making it difficult to quantify wt loss. Per pt's report, wt loss of at least 22 lb (18%) over the past ~11 months.  She is concerned about her wt loss and inability to gain wt.  - Labs: Reviewed  - Meds:  Reviewed  - Continues on ECT    MALNUTRITION  % Intake: Decreased intake does not meet criteria  % Weight Loss: Up to 20% in 1 year (non-severe)  Subcutaneous Fat Loss: Facial region: Mild-moderate  Muscle Loss: Temporal, Facial & jaw region, Scapular bone and Thoracic region (clavicle, acromium bone, deltoid, trapezius, pectoral): Moderate  Fluid Accumulation/Edema: None noted  Malnutrition Diagnosis: Non-severe malnutrition in the context of environmental or social circumstances    Previous Goals   1. Patient to consume % of nutritionally adequate meal trays TID, or the equivalent with supplements/snacks.  Evaluation: Met, but on low end  2. BMI to progress towards Normal BMI status (>18.5 kg/m2).  Evaluation: Not met  3. Patient to drink 3 Boost/day.  Evaluation: Not met    Previous Nutrition Diagnosis  Inadequate oral intake related to suboptimal appetite and not feeling well over the past couple days as evidenced by skipping many meals over the past couple days, overall reduced intakes over the past several months, and wt loss over the past ~11 months.    Evaluation: No change    CURRENT NUTRITION DIAGNOSIS  Inadequate oral intake related to poor appetite, decreasing desire to eat as evidenced by variable intakes at times and overall reduced intakes over the past several months, and wt loss over the past ~11 months.      INTERVENTIONS  Implementation  Medical food supplement therapy - continue sending Boost Plus TID with meals. Continue Magic Cup and Gelatein supplements.  Encouraged pt to increase Boost intakes to 3 Boost/day, encouraging pt to drinking Boost prior to lunch or after dinner before bed. Briefly discussed potential of appetite stimulant and that RD would recommend to provider.  Discussed with RN - discussed wt loss with RN and supplement goals. Potential of appetite stimulant per provider and pt's discretion.    Goals  1. Patient to consume % of nutritionally adequate meal trays TID,  or the equivalent with supplements/snacks.  2. BMI to progress towards Normal BMI status (>18.5 kg/m2).  3. Patient to drink 3 Boost/day.    Monitoring/Evaluation  Progress toward goals will be monitored and evaluated per protocol.    Stephanie Trejo RD, LD  Unit pgr: [phone]

## 2018-12-20 NOTE — PROGRESS NOTES
"Pt has spent much of the shift in her room.  She reports that she hasn't noticed a difference in her mood yet.  She reports her depression level is 4-5/10 and her anxiety level is 7-8/10 with 10 the most.  Pt attributes much of her anxiety to \"having my treatment tomorrow\".  Pt's affect is blunted but brightens on approach.  She denies SI or SIB thinking.  She continues to report not having much of an appetite.  She did not eat breakfast but did come out for lunch.     12/20/18 1326   Behavioral Health   Hallucinations denies / not responding to hallucinations   Thinking distractable   Orientation person: oriented;place: oriented;date: oriented   Insight poor   Judgement impaired   Eye Contact at examiner   Affect blunted, flat  (brightens on approach)   Mood depressed;anxious   Physical Appearance/Attire untidy;attire appropriate to age and situation   Hygiene neglected grooming - unclean body, hair, teeth;body odor   Suicidality (denies SI)   1. Wish to be Dead No   2. Non-Specific Active Suicidal Thoughts  No   Self Injury (denies SIB thinking)   Elopement (none observed)   Activity isolative;withdrawn   Speech clear;coherent   Psychomotor / Gait balanced;steady   Activities of Daily Living   Hygiene/Grooming independent;prompts   Oral Hygiene independent;prompts   Dress street clothes   Room Organization independent     "

## 2018-12-20 NOTE — PROGRESS NOTES
12/19/18 2227   Behavioral Health   Hallucinations denies / not responding to hallucinations   Thinking confused;distractable  (sad and depressed)   Orientation date, disoriented;time, disoriented;situation, disoriented;place: oriented;date: oriented   Memory baseline memory   Insight denial of illness;insight appropriate to situation   Judgement impaired   Mood depressed;anxious  (sad)   Physical Appearance/Attire posture slouched;untidy;disheveled   Hygiene neglected grooming - unclean body, hair, teeth   1. Wish to be Dead No   2. Non-Specific Active Suicidal Thoughts  No   3. Active Sucidal Ideation with any Methods (Not Plan) Without Intent to Act  No   4. Active Suicidal Ideation with Some Intent to Act, Without Specific Plan  No   5. Active Suicidal Ideation with Specific Plan and Intent  No   Change in Protective Factors? No   Enviromental Risk Factors None   Pt didn't have any goal for today.  Pt was visible in the milieu for most of the evening shift.  Pt spend approximately 65% of the shift in her room trying to nap, and the rest  watching tv, snacking  and  Eating dinner in the living room w/peers.   Pt socialized with some of her peers.   Pt stated that she really doesn't have much of an appetite, but she eats.  Pt mood depressed, sad and anxious.   Pt told writer that she is waiting on her family to assist her with showering because she isn't comfortable with the staff helping her.    Pt was antsy during day shift about her 5th ECT, but this time it went better than what she anticipated.  Lastly pt denies all SI and SIB thoughts.

## 2018-12-20 NOTE — PROGRESS NOTES
[facility], [place]   Psychiatric Progress Note      Impression:   Gwen Cash is a 53 year old female admitted for severe depression and weight loss of 30 lbs. We started ECT on 12/7 to improve severe anxiety, OCD.  We are adjusting medications to target mood.  We are also working with the patient on therapeutic skill building.             Diagnoses and Plan:       Principal Diagnosis: MDD, recurrent, severe without psychotic features. OCD  Unit: station 10  Attending: Wei Powell MD  Medications: risks/benefits discussed with patient-    - Paxil  40mg daily for compulsive behaviors, anxiety, depression  - Wellbutrin 100mg AM   -klonopin 0.5mg HS   -synthroid 25 mcg daily  - N-Acetyl cysteine 500mg daily    Laboratory/Imaging:  - Upreg neg, CBC wnl and BMP WNL    Consults:  -  Internal medicine, to evaluate synthroid dose.  Patient will be treated in therapeutic milieu with appropriate individual and group therapies as described.     ECT #5 performed on 12/19     Medical diagnoses to be addressed this admission:   Weight loss of 30 lbs  Hypothyroidism      Legal Status: Voluntary      Safety Assessment:   Checks: Status 15  Precautions: Suicide  Pt has not required locked seclusion or restraints in the past 24 hours to maintain safety, please refer to RN documentation for further details.    The risks, benefits, alternatives and side effects have been discussed and are understood by the patient and other caregivers.     Target symptoms to stabilize: SI, depressed, neurovegetative symptoms, sleep issues and disordered eating  Target disposition: outpatient provider        Attestation:  Patient has been seen and evaluated by me,  Wei Powell MD          Interim History:       As per nursing/psych associate note: Pt has spent much of the shift in her room.  She reports that she hasn't noticed a difference in her mood yet.  She reports her depression level is 4-5/10 and her  "anxiety level is 7-8/10 with 10 the most.  Pt attributes much of her anxiety to \"having my treatment tomorrow\".  Pt's affect is blunted but brightens on approach.  She denies SI or SIB thinking.  She continues to report not having much of an appetite.  She did not eat breakfast but did come out for lunch.        As per today's interview: Some mild improvement in mood, obsessive thoughts. Some improvement in appetite. Seen outside of room more. Some arising anxiety with ECT, but wants to continue. Denies current SI, intent or plan. Discussed having family help her with showering (which they plan to). Also discussed that patient's family will meet her (post ECT) in the conference room in the unit for no more than an hour.       The 10 point Review of Systems is negative other than noted in the HPI         Medications:       acetylcysteine  500 mg Oral Daily     buPROPion  100 mg Oral Daily     clonazePAM  0.5 mg Oral Daily     influenza vaccine adult (product based on age)  0.5 mL Intramuscular Prior to discharge     levothyroxine  25 mcg Oral QAM AC     PAXIL  40 mg Oral Daily with lunch             Allergies:     Allergies   Allergen Reactions     Metronidazole Unknown     Abstracted data , patient cannot remember      No Clinical Screening - See Comments Hives     Penicillins Unknown     Abstracted data , patient cannot remember      Phenylbutazones             Psychiatric Examination:   BP 99/69   Pulse 89   Temp 98  F (36.7  C) (Tympanic)   Resp 16   Ht 1.575 m (5' 2\")   Wt 44.2 kg (97 lb 8 oz)   SpO2 97%   BMI 17.83 kg/m    Weight is 97 lbs 8 oz  Body mass index is 17.83 kg/m .    Appearance:  awake, alert, appeared as age stated, cooperative, moderate distress and casually dressed  Attitude:  cooperative  Eye Contact:  fair  Mood:  anxious and depressed  Affect:  constricted mobility  Speech:  clear, coherent  Psychomotor Behavior:  no evidence of tardive dyskinesia, dystonia, or tics and intact station, " gait and muscle tone  Thought Process:  linear  Associations:  no loose associations  Thought Content:  no evidence of suicidal ideation or homicidal ideation, no evidence of psychotic thought, no auditory hallucinations present, no visual hallucinations present and obsessions present  Insight:  limited  Judgment:  limited  Oriented to:  time, person, and place  Attention Span and Concentration:  limited  Recent and Remote Memory:  fair  Language: Able to name objects  Fund of Knowledge: appropriate  Muscle Strength and Tone: normal  Gait and Station: Normal         Labs:   No results found for this or any previous visit (from the past 24 hour(s)).       Wei Powell MD  [facility]

## 2018-12-21 ENCOUNTER — ANESTHESIA EVENT (OUTPATIENT)
Dept: BEHAVIORAL HEALTH | Facility: CLINIC | Age: 53
End: 2018-12-21

## 2018-12-21 ENCOUNTER — ANESTHESIA (OUTPATIENT)
Dept: BEHAVIORAL HEALTH | Facility: CLINIC | Age: 53
End: 2018-12-21

## 2018-12-21 PROCEDURE — 25000125 ZZHC RX 250: Performed by: PSYCHIATRY & NEUROLOGY

## 2018-12-21 PROCEDURE — 25000125 ZZHC RX 250: Performed by: ANESTHESIOLOGY

## 2018-12-21 PROCEDURE — 25000132 ZZH RX MED GY IP 250 OP 250 PS 637: Performed by: PSYCHIATRY & NEUROLOGY

## 2018-12-21 PROCEDURE — 25000132 ZZH RX MED GY IP 250 OP 250 PS 637

## 2018-12-21 PROCEDURE — 90870 ELECTROCONVULSIVE THERAPY: CPT

## 2018-12-21 PROCEDURE — 25000132 ZZH RX MED GY IP 250 OP 250 PS 637: Performed by: PHYSICIAN ASSISTANT

## 2018-12-21 PROCEDURE — 25000132 ZZH RX MED GY IP 250 OP 250 PS 637: Performed by: EMERGENCY MEDICINE

## 2018-12-21 PROCEDURE — 99232 SBSQ HOSP IP/OBS MODERATE 35: CPT | Mod: 25 | Performed by: PSYCHIATRY & NEUROLOGY

## 2018-12-21 PROCEDURE — 99207 ZZC CDG-MDM COMPONENT: MEETS MODERATE - UP CODED: CPT | Performed by: PSYCHIATRY & NEUROLOGY

## 2018-12-21 PROCEDURE — 12400007 ZZH R&B MH INTERMEDIATE UMMC

## 2018-12-21 PROCEDURE — 25000128 H RX IP 250 OP 636: Performed by: ANESTHESIOLOGY

## 2018-12-21 RX ORDER — LIDOCAINE HYDROCHLORIDE 20 MG/ML
40 INJECTION, SOLUTION EPIDURAL; INFILTRATION; INTRACAUDAL; PERINEURAL
Status: COMPLETED | OUTPATIENT
Start: 2018-12-21 | End: 2018-12-21

## 2018-12-21 RX ADMIN — BUPROPION HYDROCHLORIDE 100 MG: 100 TABLET, FILM COATED ORAL at 14:22

## 2018-12-21 RX ADMIN — CLONAZEPAM 0.5 MG: 0.5 TABLET ORAL at 14:22

## 2018-12-21 RX ADMIN — LEVOTHYROXINE SODIUM 25 MCG: 25 TABLET ORAL at 11:45

## 2018-12-21 RX ADMIN — MELATONIN TAB 3 MG 3 MG: 3 TAB at 22:32

## 2018-12-21 RX ADMIN — Medication 500 MG: at 14:22

## 2018-12-21 RX ADMIN — LIDOCAINE HYDROCHLORIDE 40 MG: 20 INJECTION, SOLUTION EPIDURAL; INFILTRATION; INTRACAUDAL; PERINEURAL at 10:17

## 2018-12-21 RX ADMIN — Medication 40 MG: at 14:22

## 2018-12-21 RX ADMIN — METHOHEXITAL SODIUM 60 MG: 500 INJECTION, POWDER, LYOPHILIZED, FOR SOLUTION INTRAMUSCULAR; INTRAVENOUS; RECTAL at 10:16

## 2018-12-21 RX ADMIN — Medication 60 MG: at 10:16

## 2018-12-21 ASSESSMENT — ACTIVITIES OF DAILY LIVING (ADL)
LAUNDRY: WITH SUPERVISION
ORAL_HYGIENE: PROMPTS
ORAL_HYGIENE: INDEPENDENT
HYGIENE/GROOMING: INDEPENDENT
DRESS: INDEPENDENT
HYGIENE/GROOMING: PROMPTS
DRESS: STREET CLOTHES;INDEPENDENT

## 2018-12-21 ASSESSMENT — LIFESTYLE VARIABLES: TOBACCO_USE: 1

## 2018-12-21 NOTE — PROGRESS NOTES
Patient returned from ECT without incident. VSS, denies pain, nausea. Was able to drink water and take scheduled medications.     ECT provider notified writer that patient's hair is too dirty for the electrodes to properly stick for treatment and will need to wash her hair. Patient and family notified and sister is planning to help patient shower on Sunday. Patient care order obtained for patient to use a shower chair due to patient's fear of falling.

## 2018-12-21 NOTE — ANESTHESIA PREPROCEDURE EVALUATION
Anesthesia Pre-Procedure Evaluation    Patient: Gwen Cash   MRN:     [id number] Gender:   female   Age:    53 year old :      1965        Preoperative Diagnosis: * No pre-op diagnosis entered *   * No procedures listed *     Past Medical History:   Diagnosis Date     Calculus of right kidney     had hydronephrosis & treated for mild corinna at that time, calcium phosphate stone s/p lithotripsy      Chronic depressive personality disorder      DUB (dysfunctional uterine bleeding)     work up including tsh ,labs, ecc , gyn eval normal      H/O echocardiogram 2011    at [facility] was normal , no valvular issues, done for functional benign murmur noted in past      History of Papanicolaou smear of cervix 01, 04    NIL     OCD (obsessive compulsive disorder) 3/4/2017     Panic disorder without agoraphobia 3/4/2017     Tobacco use disorder 3/4/2017      Past Surgical History:   Procedure Laterality Date      SECTION      x 3     LITHOTRIPSY            Anesthesia Evaluation     . Pt has had prior anesthetic. Type: General and Regional    No history of anesthetic complications          ROS/MED HX    ENT/Pulmonary:     (+)tobacco use, , . .    Neurologic:  - neg neurologic ROS     Cardiovascular:  - neg cardiovascular ROS   (+) ----. : . . . :. . Previous cardiac testing date:results:date: results:ECG reviewed date: results:NSR. date: results:          METS/Exercise Tolerance:  >4 METS   Hematologic:  - neg hematologic  ROS       Musculoskeletal:  - neg musculoskeletal ROS       GI/Hepatic:  - neg GI/hepatic ROS       Renal/Genitourinary:     (+) Nephrolithiasis ,       Endo:  - neg endo ROS       Psychiatric: Comment: Severe anxiety and OCD.    (+) psychiatric history anxiety and other (comment)      Infectious Disease:  - neg infectious disease ROS       Malignancy:         Other:                         PHYSICAL EXAM:   Mental Status/Neuro: A/A/O   Airway: Facies:  "Feasible  Mallampati: I  Mouth/Opening: Full  TM distance: > 6 cm  Neck ROM: Full   Respiratory: Auscultation: CTAB     Resp. Rate: Normal     Resp. Effort: Normal      CV: Rhythm: Regular  Rate: Age appropriate  Heart: Normal Sounds   Comments:      Dental: Normal                  Lab Results   Component Value Date    WBC 7.8 11/16/2018    HGB 15.9 (H) 11/16/2018    HCT 46.3 11/16/2018     11/16/2018     11/29/2018    POTASSIUM 4.4 11/29/2018    CHLORIDE 102 11/29/2018    CO2 30 11/29/2018    BUN 20 11/29/2018    CR 0.74 11/29/2018    GLC 90 11/29/2018    JOSE 9.6 11/29/2018    PHOS 3.4 11/29/2018    MAG 2.2 11/29/2018    ALBUMIN 4.9 11/16/2018    PROTTOTAL 8.3 11/16/2018    ALT 17 11/16/2018    AST 11 11/16/2018    ALKPHOS 74 11/16/2018    BILITOTAL 0.4 11/16/2018    TSH 5.89 (H) 11/16/2018    T4 0.93 11/16/2018    HCG Negative 11/16/2018       Preop Vitals  BP Readings from Last 3 Encounters:   12/20/18 99/69   03/03/17 126/84    Pulse Readings from Last 3 Encounters:   12/20/18 89   03/03/17 99      Resp Readings from Last 3 Encounters:   12/21/18 16   03/03/17 16    SpO2 Readings from Last 3 Encounters:   12/21/18 98%   03/03/17 96%      Temp Readings from Last 1 Encounters:   12/21/18 36.6  C (97.9  F) (Oral)    Ht Readings from Last 1 Encounters:   11/16/18 1.575 m (5' 2\")      Wt Readings from Last 1 Encounters:   12/20/18 44.2 kg (97 lb 8 oz)    Estimated body mass index is 17.83 kg/m  as calculated from the following:    Height as of 11/16/18: 1.575 m (5' 2\").    Weight as of 12/20/18: 44.2 kg (97 lb 8 oz).     LDA:            Assessment:   ASA SCORE: 2       Documentation: H&P complete; Preop Testing complete; Consents complete   Proceeding: Proceed without further delay  Tobacco Use:  NO Active use of Tobacco/UNKNOWN Tobacco use status     Plan:   Anes. Type:  General   Pre-Induction: Midazolam IV; Acetaminophen PO   Induction:  IV (Standard)   Airway: Oral ETT   Access/Monitoring: PIV "   Maintenance: Balanced   Emergence: Procedure Site   Logistics: Same Day Surgery     Postop Pain/Sedation Strategy:  Standard-Options: Opioids PRN     PONV Management:  Adult Risk Factors: Female, Non-Smoker, Postop Opioids                             Mayra Hughes MD

## 2018-12-21 NOTE — PROGRESS NOTES
Pt denies SI, SIB, pain, and HI (auditory/visual). She reports depression, rating her depression at a 4-5 out of 10, and anxiety, rating her anxiety at a 7-8 out of 10 with 10 being the worse. Pt had ECT today and was tearful before ECT. Pt's affect was blunted but brightens upon approach. She was pleasant during the day and calm. She appeared to be fixated on the orientation of her room with the placement of her personal belongings. She spent time in and out of her room moving items around. Pt was occasionally present in the milieu pacing around the lounge, dining room, and the . No concerns from pt at the moment.       12/21/18 1423   Behavioral Health   Hallucinations denies / not responding to hallucinations   Thinking distractable   Orientation person: oriented;place: oriented;date: oriented;time: oriented   Memory baseline memory   Insight admits / accepts   Judgement impaired   Eye Contact at examiner   Affect blunted, flat   Mood mood is calm   Physical Appearance/Attire appears stated age   Hygiene neglected grooming - unclean body, hair, teeth   Suicidality other (see comments)  (denies)   1. Wish to be Dead No   2. Non-Specific Active Suicidal Thoughts  No   Self Injury other (see comment)  (denies)   Elopement (none stated/observed)   Activity withdrawn   Speech clear;coherent   Psychomotor / Gait balanced;steady   Activities of Daily Living   Hygiene/Grooming independent   Oral Hygiene independent   Dress independent   Laundry with supervision   Room Organization independent

## 2018-12-21 NOTE — PROGRESS NOTES
Pt had a calm shift. She has a full range of affect. Pt denies all psychotic symptoms, SI & SIB. Pt reports depression at 4-5 and anxiety at 7-8 on a scale of 1-10. Pt reports feeling hopeful. She reports feeling anxious every time she goes down to ECT. She reports she does not see a change in her mood/ behaviors since starting ECT, but reports that others have noticed changes in her affect. She reports feeling a weakness going through her body for the past two weeks.       12/20/18 2021   Behavioral Health   Hallucinations denies / not responding to hallucinations   Thinking distractable   Orientation person: oriented;place: oriented;date: oriented;time: oriented   Memory baseline memory   Insight poor   Judgement impaired   Eye Contact at examiner   Affect full range affect   Mood depressed;anxious   Physical Appearance/Attire untidy   Hygiene neglected grooming - unclean body, hair, teeth   Suicidality other (see comments)  (pt denies )   1. Wish to be Dead No   2. Non-Specific Active Suicidal Thoughts  No   3. Active Sucidal Ideation with any Methods (Not Plan) Without Intent to Act  No   4. Active Suicidal Ideation with Some Intent to Act, Without Specific Plan  No   5. Active Suicidal Ideation with Specific Plan and Intent  No   Self Injury other (see comment)  (pt denies )   Elopement (no behaviors observed )   Activity isolative;withdrawn   Speech clear;coherent   Psychomotor / Gait balanced;steady   Activities of Daily Living   Hygiene/Grooming prompts   Oral Hygiene prompts   Dress independent   Room Organization independent

## 2018-12-21 NOTE — PROCEDURES
Procedure/Surgery Information   [facility], [place]    Bedside Procedure Note  Date of Service (when I performed the procedure): 12/21/2018    Gwen Cash is a 53 year old female patient.  1. Major depressive disorder, remission status unspecified, unspecified whether recurrent    2. Severe recurrent major depression without psychotic features (H)      Past Medical History:   Diagnosis Date     Calculus of right kidney 1997    had hydronephrosis & treated for mild corinna at that time, calcium phosphate stone s/p lithotripsy      Chronic depressive personality disorder      DUB (dysfunctional uterine bleeding) 2004    work up including tsh ,labs, ecc , gyn eval normal      H/O echocardiogram 11/2011    at [facility] was normal , no valvular issues, done for functional benign murmur noted in past      History of Papanicolaou smear of cervix 9/14/01, 6/21/04    NIL     OCD (obsessive compulsive disorder) 3/4/2017     Panic disorder without agoraphobia 3/4/2017     Tobacco use disorder 3/4/2017     Temp: 97.9  F (36.6  C) Temp src: Oral BP: 99/69 Pulse: 89   Resp: 16 SpO2: 98 %        Procedures     Melo Wang     [facility], [place]   ECT Procedure Note   12/21/2018    Gwen Cash [id number]   53 year old 1965     Patient Status: Inpatient    Is this the first in a series of 12 treatments?  No    History and Physical: Reviewed in medical record    Consent: Informed consent was signed by: patient    Date Consent Signed: 12/7/18      Allergies   Allergen Reactions     Metronidazole Unknown     Abstracted data , patient cannot remember      No Clinical Screening - See Comments Hives     Penicillins Unknown     Abstracted data , patient cannot remember      Phenylbutazones        Weight:  97 lbs 8 oz              Indications for ECT:   Medications ineffective         Clinical Narrative:   Patient was admitted with severe depression and OCD.   She's  continuing ECT for depression.  NPO after 2400.  Alert and Oriented x 3.            Diagnosis:   Major depression         Pause for the Cause:     Right patient Yes   Right procedure/laterality settings: Yes          Intra-Procedure Documentation:       ECT #: 5   Treatment number this series: 5   Total treatment number: 5     Type of ECT:  Right, unilateral ultrabrief    ECT Medications:   Lidocaine:  40mg   Brevital: 60mg  Succinyl Choline: 60mg    ECT Strip Summary:   Energy Level: 50 percent  Motor Seizure Duration: 24 seconds  EEG Seizure Duration:  30 seconds    Complications: No    Plan:   Next ECT 12/26/18    Melo Wang MD

## 2018-12-21 NOTE — ANESTHESIA POSTPROCEDURE EVALUATION
Anesthesia POST Procedure Evaluation    Patient: Gwen Cash   MRN:     [id number] Gender:   female   Age:    53 year old :      1965        Preoperative Diagnosis: * No pre-op diagnosis entered *   * No procedures listed *   Postop Comments: No value filed.       Anesthesia Type:  General    Reportable Event: NO     PAIN: Uncomplicated   Sign Out status: Comfortable, Well controlled pain     PONV: No PONV   Sign Out status:  No Nausea or Vomiting     Neuro/Psych: Uneventful perioperative course   Sign Out Status: Preoperative baseline; Age appropriate mentation     Airway/Resp.: Uneventful perioperative course   Sign Out Status: Non labored breathing, age appropriate RR; Resp. Status within EXPECTED Parameters     CV: Uneventful perioperative course   Sign Out status: Appropriate BP and perfusion indices; Appropriate HR/Rhythm     Disposition:   Sign Out in:  PACU  Disposition:  Phase II; Home  Recovery Course: Uneventful  Follow-Up: Not required           Last Anesthesia Record Vitals:  CRNA VITALS  2018 1000 - 2018 1057      2018             Resp Rate (set):  8          Last PACU/Preop Vitals:  Vitals:    18 0953 18 1030 18 1045   BP:  147/88 128/74   Pulse:  95 91   Resp: 16 16 16   Temp:  36.5  C (97.7  F)    SpO2:  98% 97%         Electronically Signed By: Mayra Hughes MD, 2018, 10:57 AM

## 2018-12-21 NOTE — PROGRESS NOTES
[facility], [place]   Psychiatric Progress Note      Impression:   Gwen Cash is a 53 year old female admitted for severe depression and weight loss of 30 lbs. We started ECT on 12/7 to improve severe anxiety, OCD.  She seems to exhibit some relative improvement in her subjective mood, energy, and affective presentation. We are adjusting medications to target mood.  We are also working with the patient on therapeutic skill building.             Diagnoses and Plan:       Principal Diagnosis: MDD, recurrent, severe without psychotic features. OCD  Unit: station 10  Attending: Wei Powell MD  Medications: risks/benefits discussed with patient-    - Paxil  40mg daily for compulsive behaviors, anxiety, depression  - Wellbutrin 100mg AM   -klonopin 0.5mg HS   -synthroid 25 mcg daily  - N-Acetyl cysteine 500mg daily    Laboratory/Imaging:  - Upreg neg, CBC wnl and BMP WNL    Consults:  -  Internal medicine, to evaluate synthroid dose.  Patient will be treated in therapeutic milieu with appropriate individual and group therapies as described.     ECT #6 performed on 12/21     Medical diagnoses to be addressed this admission:   Weight loss of 30 lbs  Hypothyroidism      Legal Status: Voluntary      Safety Assessment:   Checks: Status 15  Precautions: Suicide  Pt has not required locked seclusion or restraints in the past 24 hours to maintain safety, please refer to RN documentation for further details.    The risks, benefits, alternatives and side effects have been discussed and are understood by the patient and other caregivers.     Target symptoms to stabilize: SI, depressed, neurovegetative symptoms, sleep issues and disordered eating  Target disposition: outpatient provider        Attestation:  Patient has been seen and evaluated by me,  Wei Powell MD          Interim History:       As per nursing/psych associate note: Patient returned from ECT without incident. VSS, denies  "pain, nausea. Was able to drink water and take scheduled medications. ECT provider notified writer that patient's hair is too dirty for the electrodes to properly stick for treatment and will need to wash her hair. Patient and family notified and sister is planning to help patient shower on Sunday. Patient care order obtained for patient to use a shower chair due to patient's fear of falling.             As per today's interview: Some mild improvement in mood, obsessive thoughts. Some improvement in appetite. Seen outside of room more. Met patient with family members present, discussed need for shower and improved personal hygiene.  Denies current SI, intent or plan. Discussed having family help her with showering (which they plan to on Sunday).                Medications:       acetylcysteine  500 mg Oral Daily     buPROPion  100 mg Oral Daily     clonazePAM  0.5 mg Oral Daily     influenza vaccine adult (product based on age)  0.5 mL Intramuscular Prior to discharge     levothyroxine  25 mcg Oral QAM AC     PAXIL  40 mg Oral Daily with lunch             Allergies:     Allergies   Allergen Reactions     Metronidazole Unknown     Abstracted data , patient cannot remember      No Clinical Screening - See Comments Hives     Penicillins Unknown     Abstracted data , patient cannot remember      Phenylbutazones             Psychiatric Examination:   /65   Pulse 85   Temp 98.4  F (36.9  C) (Tympanic)   Resp 16   Ht 1.575 m (5' 2\")   Wt 44.2 kg (97 lb 8 oz)   SpO2 97%   BMI 17.83 kg/m    Weight is 97 lbs 8 oz  Body mass index is 17.83 kg/m .    Appearance:  awake, alert, appeared as age stated, cooperative, moderate distress and casually dressed  Attitude:  cooperative  Eye Contact:  fair  Mood:  anxious and depressed  Affect:  constricted mobility  Speech:  clear, coherent  Psychomotor Behavior:  no evidence of tardive dyskinesia, dystonia, or tics and intact station, gait and muscle tone  Thought Process:  " linear  Associations:  no loose associations  Thought Content:  no evidence of suicidal ideation or homicidal ideation, no evidence of psychotic thought, no auditory hallucinations present, no visual hallucinations present and obsessions present  Insight:  limited  Judgment:  limited  Oriented to:  time, person, and place  Attention Span and Concentration:  limited  Recent and Remote Memory:  fair  Language: Able to name objects  Fund of Knowledge: appropriate  Muscle Strength and Tone: normal  Gait and Station: Normal         Labs:   No results found for this or any previous visit (from the past 24 hour(s)).       Wei Powell MD  [facility]

## 2018-12-22 PROCEDURE — 12400007 ZZH R&B MH INTERMEDIATE UMMC

## 2018-12-22 PROCEDURE — 25000132 ZZH RX MED GY IP 250 OP 250 PS 637: Performed by: EMERGENCY MEDICINE

## 2018-12-22 PROCEDURE — 25000132 ZZH RX MED GY IP 250 OP 250 PS 637

## 2018-12-22 PROCEDURE — 25000132 ZZH RX MED GY IP 250 OP 250 PS 637: Performed by: PSYCHIATRY & NEUROLOGY

## 2018-12-22 PROCEDURE — 25000132 ZZH RX MED GY IP 250 OP 250 PS 637: Performed by: PHYSICIAN ASSISTANT

## 2018-12-22 RX ADMIN — LEVOTHYROXINE SODIUM 25 MCG: 25 TABLET ORAL at 10:00

## 2018-12-22 RX ADMIN — BUPROPION HYDROCHLORIDE 100 MG: 100 TABLET, FILM COATED ORAL at 14:26

## 2018-12-22 RX ADMIN — Medication 40 MG: at 14:25

## 2018-12-22 RX ADMIN — Medication 500 MG: at 14:26

## 2018-12-22 RX ADMIN — CLONAZEPAM 0.5 MG: 0.5 TABLET ORAL at 14:26

## 2018-12-22 ASSESSMENT — ACTIVITIES OF DAILY LIVING (ADL)
HYGIENE/GROOMING: PROMPTS
ORAL_HYGIENE: PROMPTS
HYGIENE/GROOMING: PROMPTS
LAUNDRY: WITH SUPERVISION
ORAL_HYGIENE: PROMPTS
DRESS: STREET CLOTHES
DRESS: PROMPTS

## 2018-12-22 NOTE — PLAN OF CARE
Emotion/Mood Impairment (Obsessive-Compulsive Signs/Symptoms) (Adult)  Improved Mood Symptoms (Obsessive-Compulsive Signs/Symptoms)  12/22/2018 1558 - No Change by Cira Santillan RN  Flowsheets  Taken 12/22/2018 1606   Verbalized Emotional State  anxiety;depression;fear  Affect  flat  Emotion/Mood/Affect WDL  ex  Emotion/Mood  anxious;depressed  Patient spent entire shift in her room, only left to get her medications. Had no peer interaction. States she is feeling nauseated. Writer offered ginger ale, maalox, zofran. Patient declined interventions stating she just wanted to lie down. Patient did not eat, drank her boost drink. Denies suicidal ideation. Endorses depression and anxiety.

## 2018-12-22 NOTE — PROGRESS NOTES
"Pt was visible in the milieu for about half the shift, she was observed organizing her room and sitting in the lounge watching movies with peers. Pt's overall affect was blunted/flat but brightened upon approach. Pt's mood was anxious. Pt endorsed feelings of depression and anxiety she rated depression 4/10 and anxiety 7-8/10. Pt denied SI, SIB, and hallucinations. Pt stated she feels hopeful. Pt did attend the meditation group led by staff. Pt ate 100% of her meal this evening but stated \"I don't feel hungry but I eat anyways\". Pt had three visitors and stated the visit went well. Pt is planning to shower this Sunday when family is present to help her. Overall pt had a good shift and there were no major concerns.        12/21/18 2126   Behavioral Health   Hallucinations denies / not responding to hallucinations   Thinking distractable   Orientation person: oriented;place: oriented   Insight poor   Judgement impaired   Eye Contact at examiner   Affect blunted, flat  (Brightens upon approach)   Mood anxious   Physical Appearance/Attire posture slouched;disheveled   Hygiene neglected grooming - unclean body, hair, teeth;body odor   Suicidality (Denies)   1. Wish to be Dead No   2. Non-Specific Active Suicidal Thoughts  No   Self Injury (Denies, none observed)   Elopement (None observed, no statements made)   Activity withdrawn   Speech clear;coherent   Psychomotor / Gait balanced;steady   Activities of Daily Living   Hygiene/Grooming prompts   Oral Hygiene prompts   Dress street clothes;independent   Room Organization independent     "

## 2018-12-23 PROCEDURE — 25000132 ZZH RX MED GY IP 250 OP 250 PS 637: Performed by: EMERGENCY MEDICINE

## 2018-12-23 PROCEDURE — 12400007 ZZH R&B MH INTERMEDIATE UMMC

## 2018-12-23 PROCEDURE — 25000132 ZZH RX MED GY IP 250 OP 250 PS 637

## 2018-12-23 PROCEDURE — 25000132 ZZH RX MED GY IP 250 OP 250 PS 637: Performed by: PHYSICIAN ASSISTANT

## 2018-12-23 PROCEDURE — 25000132 ZZH RX MED GY IP 250 OP 250 PS 637: Performed by: PSYCHIATRY & NEUROLOGY

## 2018-12-23 RX ADMIN — Medication 500 MG: at 14:20

## 2018-12-23 RX ADMIN — BUPROPION HYDROCHLORIDE 100 MG: 100 TABLET, FILM COATED ORAL at 14:18

## 2018-12-23 RX ADMIN — LEVOTHYROXINE SODIUM 25 MCG: 25 TABLET ORAL at 09:40

## 2018-12-23 RX ADMIN — CLONAZEPAM 0.5 MG: 0.5 TABLET ORAL at 14:18

## 2018-12-23 RX ADMIN — Medication 40 MG: at 14:18

## 2018-12-23 RX ADMIN — MELATONIN TAB 3 MG 3 MG: 3 TAB at 21:54

## 2018-12-23 ASSESSMENT — ACTIVITIES OF DAILY LIVING (ADL)
DRESS: STREET CLOTHES
HYGIENE/GROOMING: PROMPTS;WITH ASSISTANCE
DRESS: STREET CLOTHES;INDEPENDENT
ORAL_HYGIENE: PROMPTS
LAUNDRY: UNABLE TO COMPLETE
ORAL_HYGIENE: PROMPTS
HYGIENE/GROOMING: PROMPTS

## 2018-12-23 NOTE — PROGRESS NOTES
"Pt has been isolative to her room.  She does not attend groups and keeps to herself.  Pt reports that she isn't feeling well, \"just really achy\".  Pt reports that she will not be showering today due to her sister not being available.  Pt reports that her depression and anxiety level are 4-5 of 10 with 10 the most.  Pt reports that she is sleeping well but does not have an appetite.  She refused both breakfast and lunch but was able to drink all of her boost.     12/23/18 1349   Behavioral Health   Hallucinations denies / not responding to hallucinations   Thinking distractable   Orientation person: oriented;place: oriented;date: oriented   Insight poor   Judgement impaired   Affect blunted, flat   Mood depressed;anxious   Physical Appearance/Attire untidy;attire appropriate to age and situation   Hygiene neglected grooming - unclean body, hair, teeth;body odor   Suicidality (denies SI)   1. Wish to be Dead No   2. Non-Specific Active Suicidal Thoughts  No   Self Injury (denies SIB thinking)   Elopement (none observed)   Activity isolative;withdrawn   Speech clear;coherent   Psychomotor / Gait balanced;steady   Activities of Daily Living   Hygiene/Grooming prompts;with assistance   Oral Hygiene prompts   Dress street clothes   Room Organization independent     "

## 2018-12-23 NOTE — PROGRESS NOTES
Staff was unable to check in with patient due to patient sleeping entire shift. Pt refused dinner and declined promptings to drink her boost. Pt did intake 200cc of water.        12/22/18 2035   Behavioral Health   Hallucinations denies / not responding to hallucinations   Thinking distractable   Orientation person: oriented;place: oriented   Memory baseline memory   Insight poor   Judgement impaired   Eye Contact at examiner   Affect blunted, flat   Mood anxious   Physical Appearance/Attire untidy   Hygiene neglected grooming - unclean body, hair, teeth   Suicidality other (see comments)  (none reported or observed )   1. Wish to be Dead No   2. Non-Specific Active Suicidal Thoughts  No   Self Injury other (see comment)  (denies )   Elopement (no behaviors observed on this shift )   Activity isolative;withdrawn   Speech clear;coherent   Psychomotor / Gait balanced;steady   Activities of Daily Living   Hygiene/Grooming prompts   Oral Hygiene prompts   Dress prompts   Room Organization independent

## 2018-12-24 PROCEDURE — 25000132 ZZH RX MED GY IP 250 OP 250 PS 637: Performed by: EMERGENCY MEDICINE

## 2018-12-24 PROCEDURE — 12400007 ZZH R&B MH INTERMEDIATE UMMC

## 2018-12-24 PROCEDURE — 25000132 ZZH RX MED GY IP 250 OP 250 PS 637: Performed by: PSYCHIATRY & NEUROLOGY

## 2018-12-24 PROCEDURE — 25000132 ZZH RX MED GY IP 250 OP 250 PS 637: Performed by: PHYSICIAN ASSISTANT

## 2018-12-24 PROCEDURE — 99232 SBSQ HOSP IP/OBS MODERATE 35: CPT | Performed by: PSYCHIATRY & NEUROLOGY

## 2018-12-24 PROCEDURE — 99207 ZZC CDG-MDM COMPONENT: MEETS MODERATE - UP CODED: CPT | Performed by: PSYCHIATRY & NEUROLOGY

## 2018-12-24 PROCEDURE — 25000132 ZZH RX MED GY IP 250 OP 250 PS 637

## 2018-12-24 RX ADMIN — Medication 40 MG: at 11:59

## 2018-12-24 RX ADMIN — BUPROPION HYDROCHLORIDE 100 MG: 100 TABLET, FILM COATED ORAL at 12:00

## 2018-12-24 RX ADMIN — LEVOTHYROXINE SODIUM 25 MCG: 25 TABLET ORAL at 08:26

## 2018-12-24 RX ADMIN — CLONAZEPAM 0.5 MG: 0.5 TABLET ORAL at 12:00

## 2018-12-24 RX ADMIN — ACETAMINOPHEN 650 MG: 325 TABLET, FILM COATED ORAL at 10:30

## 2018-12-24 RX ADMIN — Medication 500 MG: at 12:00

## 2018-12-24 RX ADMIN — MELATONIN TAB 3 MG 3 MG: 3 TAB at 22:45

## 2018-12-24 ASSESSMENT — ACTIVITIES OF DAILY LIVING (ADL)
LAUNDRY: WITH SUPERVISION
DRESS: STREET CLOTHES
DRESS: INDEPENDENT
HYGIENE/GROOMING: INDEPENDENT
ORAL_HYGIENE: PROMPTS
HYGIENE/GROOMING: PROMPTS
ORAL_HYGIENE: INDEPENDENT

## 2018-12-24 NOTE — PROVIDER NOTIFICATION
Pt continues to isolate in room, with no interaction with peers or participation in unit activity. Remains very resistant to taking a shower despite prompts and encouragement from staff. Appetite remains  poor with patient not eating anything at breakfast but able to drink one boost with encouragement and also taking in some fluids. Complained of body aches today with PRN tylenol utilized with relief. Pt became very emotional as staff tried to encourage her to take a shower. Plan is for sister to help with shower tomorrow.       12/24/18 1300   Behavioral Health   Hallucinations denies / not responding to hallucinations   Thinking confused;delusional;poor concentration   Orientation person: oriented;place: oriented   Memory baseline memory   Insight poor   Judgement impaired   Affect blunted, flat;sad   Physical Appearance/Attire disheveled;other (see comment)  (Hygiene severly neglected)   Hygiene body odor   1. Wish to be Dead No   2. Non-Specific Active Suicidal Thoughts  No   3. Active Sucidal Ideation with any Methods (Not Plan) Without Intent to Act  No   4. Active Suicidal Ideation with Some Intent to Act, Without Specific Plan  No   5. Active Suicidal Ideation with Specific Plan and Intent  No   Self Injury other (see comment)  (Pt currently denies)   Activity isolative;withdrawn   Speech coherent;clear   Medication Sensitivity no observed side effects;no stated side effects   Psychomotor / Gait balanced;steady   Coping/Psychosocial   Verbalized Emotional State anxiety;depression;sadness   Plan of Care Reviewed With patient   Patient Agreement with Plan of Care agrees   Fall Risk Interventions   Fall precaution band in place? Yes   Activities of Daily Living   Hygiene/Grooming prompts  (Refused to complete activity)   Oral Hygiene prompts  (Refuse to complete activity)   Dress independent   Laundry (Activity not completed)

## 2018-12-24 NOTE — PROGRESS NOTES
"Pt was in her room sleeping for the majority of the shift, she did not come out for meals she had her boost and her jello in her room. Pt stated \"I'm not feeling well today, my body is achy\". Pt was isolative and withdrawn, when approached for check in pt became tearful. Pt continues to neglect grooming and still hasn't changed her clothes. Pt's overall affect was sad and flat, her mood was depressed. Pt endorsed feelings of depression and anxiety, she rated both 4-5/10. Pt denied SI, SIB, and hallucinations. There were no major concerns during the shift.        12/23/18 2149   Behavioral Health   Hallucinations denies / not responding to hallucinations   Thinking distractable   Orientation person: oriented;place: oriented   Insight poor   Judgement impaired   Eye Contact at examiner   Affect sad   Mood depressed   Physical Appearance/Attire posture slouched;untidy;disheveled   Hygiene neglected grooming - unclean body, hair, teeth;body odor   Suicidality other (see comments)  (Denies)   1. Wish to be Dead No   2. Non-Specific Active Suicidal Thoughts  No   Self Injury other (see comment)  (Denies)   Elopement (None observed, no statements made)   Activity isolative;withdrawn;other (see comment)  (sleeping)   Speech clear;coherent   Psychomotor / Gait wrings hands;balanced;steady   Activities of Daily Living   Hygiene/Grooming prompts   Oral Hygiene prompts   Dress street clothes;independent   Laundry unable to complete   Room Organization independent     "

## 2018-12-25 PROCEDURE — 12400007 ZZH R&B MH INTERMEDIATE UMMC

## 2018-12-25 PROCEDURE — 25000132 ZZH RX MED GY IP 250 OP 250 PS 637: Performed by: PSYCHIATRY & NEUROLOGY

## 2018-12-25 PROCEDURE — 25000132 ZZH RX MED GY IP 250 OP 250 PS 637: Performed by: EMERGENCY MEDICINE

## 2018-12-25 PROCEDURE — 25000132 ZZH RX MED GY IP 250 OP 250 PS 637

## 2018-12-25 PROCEDURE — 25000132 ZZH RX MED GY IP 250 OP 250 PS 637: Performed by: PHYSICIAN ASSISTANT

## 2018-12-25 RX ADMIN — Medication 40 MG: at 14:39

## 2018-12-25 RX ADMIN — MELATONIN TAB 3 MG 3 MG: 3 TAB at 22:36

## 2018-12-25 RX ADMIN — CLONAZEPAM 0.5 MG: 0.5 TABLET ORAL at 14:39

## 2018-12-25 RX ADMIN — BUPROPION HYDROCHLORIDE 100 MG: 100 TABLET, FILM COATED ORAL at 14:39

## 2018-12-25 RX ADMIN — LEVOTHYROXINE SODIUM 25 MCG: 25 TABLET ORAL at 08:57

## 2018-12-25 RX ADMIN — Medication 500 MG: at 14:39

## 2018-12-25 ASSESSMENT — MIFFLIN-ST. JEOR: SCORE: 986.89

## 2018-12-25 ASSESSMENT — ACTIVITIES OF DAILY LIVING (ADL)
LAUNDRY: UNABLE TO COMPLETE
ORAL_HYGIENE: WITH ASSISTANCE
HYGIENE/GROOMING: WITH ASSISTANCE
DRESS: STREET CLOTHES

## 2018-12-25 NOTE — PROGRESS NOTES
[facility], [place]   Psychiatric Progress Note      Impression:   Gwen Cash is a 53 year old female admitted for severe depression and weight loss of 30 lbs. We started ECT on 12/7 to improve severe anxiety, OCD.  We are adjusting medications to target mood.  We are also working with the patient on therapeutic skill building.             Diagnoses and Plan:       Principal Diagnosis: MDD, recurrent, severe without psychotic features. OCD  Unit: station 10  Attending: Santana  Medications: risks/benefits discussed with patient-    - Paxil  40mg daily for compulsive behaviors, anxiety, depression  - Wellbutrin 100mg AM   -klonopin 0.5mg HS   -synthroid 25 mcg daily  - N-Acetyl cysteine 500mg daily  Laboratory/Imaging:  - Upreg neg, CBC wnl and BMP WNL  Consults:  -  Internal medicine, to evaluate synthroid dose.  Patient will be treated in therapeutic milieu with appropriate individual and group therapies as described.     ECT #6 scheduled on 12/26.    Medical diagnoses to be addressed this admission:   Weight loss of 30 lbs  Hypothyroidism      Legal Status: Voluntary      Safety Assessment:   Checks: Status 15  Precautions: Suicide  Pt has not required locked seclusion or restraints in the past 24 hours to maintain safety, please refer to RN documentation for further details.    The risks, benefits, alternatives and side effects have been discussed and are understood by the patient and other caregivers.     Target symptoms to stabilize: SI, depressed, neurovegetative symptoms, sleep issues and disordered eating  Target disposition: outpatient provider          Attestation:  Patient has been seen and evaluated by me,  Joe Dillon MD          Interim History:   The patient's care was discussed with the treatment team and chart notes were reviewed.    In team meeting, staff reports that ECT attending  called the unit and informed that unless the patient takes shower and get the  "grease off her skin, they cannot attach the electronic pad on to her skin anymore.   Patient still has not taken a shower at all since admitted and hygiene is extremely poor. Patient was told of what the ECT staff said and the pt says that tomorrow, her sister will come to help her take shower.   She says that she is feeling \"achy\" in body and was lying in bed, when writer visited her. She says that she might be feeling slightly better with ECT, but not a lot.   She continues to eat poorly- there has not been a weight gain since admitted.   Today is Monday but due to holiday, ECT is closed and she did not get ECT. She plans to get ECT on Wednesday.  Obsessive fear and anxiety are still debilitating to her. She continues to deny SI.      The 10 point Review of Systems is negative other than noted in the HPI         Medications:       acetylcysteine  500 mg Oral Daily     buPROPion  100 mg Oral Daily     clonazePAM  0.5 mg Oral Daily     influenza vaccine adult (product based on age)  0.5 mL Intramuscular Prior to discharge     levothyroxine  25 mcg Oral QAM AC     PAXIL  40 mg Oral Daily with lunch             Allergies:     Allergies   Allergen Reactions     Metronidazole Unknown     Abstracted data , patient cannot remember      No Clinical Screening - See Comments Hives     Penicillins Unknown     Abstracted data , patient cannot remember      Phenylbutazones             Psychiatric Examination:   /64   Pulse 89   Temp 98.5  F (36.9  C) (Tympanic)   Resp 16   Ht 1.575 m (5' 2\")   Wt 44.2 kg (97 lb 8 oz)   SpO2 97%   BMI 17.83 kg/m    Weight is 97 lbs 8 oz  Body mass index is 17.83 kg/m .    Appearance:    Attitude:    Eye Contact:    Mood:    Affect:    Speech:    Psychomotor Behavior:    Thought Process:    Associations:    Thought Content:    Insight:    Judgment:    Oriented to:    Attention Span and Concentration:    Recent and Remote Memory:    Language:   Fund of Knowledge:   Muscle Strength and " Tone:   Gait and Station:          Labs:   No results found for this or any previous visit (from the past 24 hour(s)).

## 2018-12-25 NOTE — PROGRESS NOTES
"   12/24/18 2138   Behavioral Health   Hallucinations denies / not responding to hallucinations   Thinking poor concentration   Orientation time: oriented;date: oriented;person: oriented;place: oriented   Memory baseline memory   Insight insight appropriate to situation   Judgement impaired   Eye Contact at examiner   Affect blunted, flat   Physical Appearance/Attire attire appropriate to age and situation   Hygiene neglected grooming - unclean body, hair, teeth   Suicidality (denied )   1. Wish to be Dead No   2. Non-Specific Active Suicidal Thoughts  No   Self Injury (denied )   Elopement (denied )   Activity withdrawn;isolative   Speech coherent;clear   Medication Sensitivity no observed side effects;no stated side effects   Psychomotor / Gait balanced   Psycho Education   Type of Intervention 1:1 intervention   Response participates, initiates socially appropriate   Hours 0.5   Activities of Daily Living   Hygiene/Grooming independent   Oral Hygiene independent   Dress street clothes   Laundry with supervision   Room Organization independent   Activity   Activity Assistance Provided independent     Pt was calm and cooperative with blunted mood affect. She was visibile in milieu for the majority part of the shift, otherwise she was in her room resting. She rate her depression 5/10 and anxiety 6/10, denied any paranoid thoughts, psychotic symptoms and SI/SIB. She had a few visitors and she seems to be delighted about that. She is hoping to discharge near the future, possible after ECT treatment. She stated \"I cannot tell if the ECT is working or not\". Appetite was \"ok\" and sleeping well.   "

## 2018-12-25 NOTE — PLAN OF CARE
Behavior Regulation (Impulse Control) Impairment (Obsessive-Compulsive Signs/Symptoms) (Adult)  Improved Behavior Regulation and Impulse Control (Obsessive-Compulsive Signs/Symptoms)  12/25/2018 1419 - Improving by Cira Santillan RN  Promote Behavior and Impulse Control  12/25/2018 1419 by Cira Santillan RN  Flowsheets  Taken 12/25/2018 1419   Mutually Determined Action Steps (Promote Behavior and Impulse Control)  verbalizes motivation for change    With help from her sister, patient was able to take a shower, wash her hair, brush her teeth, change her clothes, and cut her finger and toe nails. This has been a daily topic of discussion with patient as she had not been able to bring herself to do so since being admitted to the hospital. She states she is beginning to notice an improvement in her mood and depressive symptoms. Patient smiles more and is more animated. Is beginning to show interest in other people and is more conversational. She continues to be extremely fearful of ECT and becomes tearful whenever it is mentioned. Patient states that she looked at some paperwork from her admission and was surprised to see that she has been hospitalized for nearly 6 weeks. Going forward, patient has agreed to brushing her teeth daily and changing her clothes every other day.

## 2018-12-26 ENCOUNTER — ANESTHESIA EVENT (OUTPATIENT)
Dept: BEHAVIORAL HEALTH | Facility: CLINIC | Age: 53
End: 2018-12-26

## 2018-12-26 ENCOUNTER — ANESTHESIA (OUTPATIENT)
Dept: BEHAVIORAL HEALTH | Facility: CLINIC | Age: 53
End: 2018-12-26

## 2018-12-26 PROCEDURE — 12400007 ZZH R&B MH INTERMEDIATE UMMC

## 2018-12-26 PROCEDURE — 99207 ZZC CDG-MDM COMPONENT: MEETS MODERATE - UP CODED: CPT | Performed by: PSYCHIATRY & NEUROLOGY

## 2018-12-26 PROCEDURE — 99232 SBSQ HOSP IP/OBS MODERATE 35: CPT | Mod: 25 | Performed by: PSYCHIATRY & NEUROLOGY

## 2018-12-26 PROCEDURE — 25000132 ZZH RX MED GY IP 250 OP 250 PS 637: Performed by: PHYSICIAN ASSISTANT

## 2018-12-26 PROCEDURE — 25000132 ZZH RX MED GY IP 250 OP 250 PS 637: Performed by: PSYCHIATRY & NEUROLOGY

## 2018-12-26 PROCEDURE — 25000132 ZZH RX MED GY IP 250 OP 250 PS 637: Performed by: EMERGENCY MEDICINE

## 2018-12-26 PROCEDURE — 25000128 H RX IP 250 OP 636: Performed by: ANESTHESIOLOGY

## 2018-12-26 PROCEDURE — 90870 ELECTROCONVULSIVE THERAPY: CPT

## 2018-12-26 PROCEDURE — 25000125 ZZHC RX 250: Performed by: PSYCHIATRY & NEUROLOGY

## 2018-12-26 PROCEDURE — 25000125 ZZHC RX 250: Performed by: ANESTHESIOLOGY

## 2018-12-26 RX ORDER — CAFFEINE AND SODIUM BENZOATE 125 MG/ML
500 INJECTION, SOLUTION INTRAMUSCULAR; INTRAVENOUS
Status: COMPLETED | OUTPATIENT
Start: 2018-12-26 | End: 2018-12-26

## 2018-12-26 RX ORDER — CAFFEINE AND SODIUM BENZOATE 125 MG/ML
250 INJECTION, SOLUTION INTRAMUSCULAR; INTRAVENOUS
Status: DISCONTINUED | OUTPATIENT
Start: 2018-12-26 | End: 2018-12-26

## 2018-12-26 RX ORDER — PAROXETINE 10 MG/5ML
40 SUSPENSION ORAL DAILY
Status: DISCONTINUED | OUTPATIENT
Start: 2018-12-26 | End: 2019-01-11 | Stop reason: HOSPADM

## 2018-12-26 RX ORDER — LIDOCAINE HYDROCHLORIDE 20 MG/ML
40 INJECTION, SOLUTION EPIDURAL; INFILTRATION; INTRACAUDAL; PERINEURAL
Status: COMPLETED | OUTPATIENT
Start: 2018-12-26 | End: 2018-12-26

## 2018-12-26 RX ADMIN — BUPROPION HYDROCHLORIDE 100 MG: 100 TABLET, FILM COATED ORAL at 13:57

## 2018-12-26 RX ADMIN — METHOHEXITAL SODIUM 60 MG: 500 INJECTION, POWDER, LYOPHILIZED, FOR SOLUTION INTRAMUSCULAR; INTRAVENOUS; RECTAL at 09:19

## 2018-12-26 RX ADMIN — Medication 60 MG: at 09:19

## 2018-12-26 RX ADMIN — CAFFEINE AND SODIUM BENZOATE 500 MG: 125 INJECTION, SOLUTION INTRAMUSCULAR; INTRAVENOUS at 09:20

## 2018-12-26 RX ADMIN — CLONAZEPAM 0.5 MG: 0.5 TABLET ORAL at 13:57

## 2018-12-26 RX ADMIN — Medication 500 MG: at 13:57

## 2018-12-26 RX ADMIN — LIDOCAINE HYDROCHLORIDE 40 MG: 20 INJECTION, SOLUTION EPIDURAL; INFILTRATION; INTRACAUDAL; PERINEURAL at 09:20

## 2018-12-26 RX ADMIN — MELATONIN TAB 3 MG 3 MG: 3 TAB at 22:35

## 2018-12-26 RX ADMIN — LEVOTHYROXINE SODIUM 25 MCG: 25 TABLET ORAL at 10:30

## 2018-12-26 RX ADMIN — PAROXETINE HYDROCHLORIDE 40 MG: 10 SUSPENSION ORAL at 13:57

## 2018-12-26 ASSESSMENT — ACTIVITIES OF DAILY LIVING (ADL)
DRESS: INDEPENDENT
ORAL_HYGIENE: INDEPENDENT
DRESS: INDEPENDENT
ORAL_HYGIENE: INDEPENDENT
HYGIENE/GROOMING: PROMPTS
HYGIENE/GROOMING: INDEPENDENT

## 2018-12-26 ASSESSMENT — LIFESTYLE VARIABLES: TOBACCO_USE: 1

## 2018-12-26 NOTE — PROGRESS NOTES
"Pt discharged from the recovery room via wheelchair back to station 10N with staff at  1010 .  Report given to station Trupti VILLAGOMEZ.    As patient was leaving the recovery room to go to station 10N, she stated that she felt \" a little fluttering in her chest.\"  She stated she has had this feeling before she started ECT.  Pt did have normal sinus rhythm strip today in ECT.  Pt also had previous EKG in November.  Reported to Station 10N RN, Trupti, about patient's complaints so it could follow up.  Pt denies that the fluttering is from anxiety.    Upon discharge from recovery room patient VSS.               .  "

## 2018-12-26 NOTE — PROGRESS NOTES
"Pt was visible in the milieu off and on through out the shift, she was seen sitting in the dining room, sitting in the lounge, and organizing her room. Pt did shower on the day shift and her mood appeared bright and happy this shift. Pt endorsed feelings of depression and anxiety, she rated depression 4-5/10 and anxiety 7-8/10. Pt denied SI, SIB, and hallucinations. Pt expressed some concern over ECT tomorrow. Pt stated appetite was \"the same, I don't feel hungry but I still eat\". Pt ate about 75% of her meal. Overall pt had a good shift, there were no major concerns.        12/25/18 1955   Behavioral Health   Hallucinations denies / not responding to hallucinations   Thinking distractable   Orientation person: oriented;place: oriented   Insight poor   Judgement impaired   Eye Contact at examiner   Affect full range affect   Mood anxious;mood is calm   Physical Appearance/Attire attire appropriate to age and situation;neat   Hygiene well groomed   Suicidality other (see comments)  (Denies)   1. Wish to be Dead No   2. Non-Specific Active Suicidal Thoughts  No   Self Injury (Denies)   Elopement (no statements made, none observed)   Activity isolative;withdrawn   Speech clear;coherent   Psychomotor / Gait balanced;steady     "

## 2018-12-26 NOTE — ANESTHESIA POSTPROCEDURE EVALUATION
Anesthesia POST Procedure Evaluation    Patient: Gwen Cash   MRN:     [id number] Gender:   female   Age:    53 year old :      1965        Preoperative Diagnosis: * No pre-op diagnosis entered *   * No procedures listed *   Postop Comments: No value filed.       Anesthesia Type:  General    Reportable Event: NO     PAIN: Uncomplicated   Sign Out status: Comfortable, Well controlled pain     PONV: No PONV   Sign Out status:  No Nausea or Vomiting     Neuro/Psych: Uneventful perioperative course   Sign Out Status: Preoperative baseline; Age appropriate mentation     Airway/Resp.: Uneventful perioperative course   Sign Out Status: Non labored breathing, age appropriate RR; Resp. Status within EXPECTED Parameters     CV: Uneventful perioperative course   Sign Out status: Appropriate BP and perfusion indices; Appropriate HR/Rhythm     Disposition:   Sign Out in:  PACU  Disposition:  Phase II; Home  Recovery Course: Uneventful  Follow-Up: Not required           Last Anesthesia Record Vitals:  CR[facility] VITALS  2018 0858 - 2018 0945      2018             Pulse:  103    SpO2:  100 %    Resp Rate (set):  8          Last PACU/Preop Vitals:  Vitals:    18 1600 18 0806 18 0930   BP:   159/88   Pulse:      Resp: 16 16 16   Temp: 36.7  C (98.1  F) 36.3  C (97.4  F) 36.6  C (97.8  F)   SpO2:  97% 99%         Electronically Signed By: Xi Sierra MD, 2018, 9:45 AM

## 2018-12-26 NOTE — ANESTHESIA PREPROCEDURE EVALUATION
Anesthesia Pre-Procedure Evaluation    Patient: Gwen Cash   MRN:     [id number] Gender:   female   Age:    53 year old :      1965        Preoperative Diagnosis: * No pre-op diagnosis entered *   * No procedures listed *     Past Medical History:   Diagnosis Date     Calculus of right kidney     had hydronephrosis & treated for mild corinna at that time, calcium phosphate stone s/p lithotripsy      Chronic depressive personality disorder      DUB (dysfunctional uterine bleeding)     work up including tsh ,labs, ecc , gyn eval normal      H/O echocardiogram 2011    at [facility] was normal , no valvular issues, done for functional benign murmur noted in past      History of Papanicolaou smear of cervix 01, 04    NIL     OCD (obsessive compulsive disorder) 3/4/2017     Panic disorder without agoraphobia 3/4/2017     Tobacco use disorder 3/4/2017      Past Surgical History:   Procedure Laterality Date      SECTION      x 3     LITHOTRIPSY            Anesthesia Evaluation     . Pt has had prior anesthetic. Type: General and Regional    No history of anesthetic complications          ROS/MED HX    ENT/Pulmonary:     (+)tobacco use, , . .    Neurologic:  - neg neurologic ROS     Cardiovascular:  - neg cardiovascular ROS   (+) ----. : . . . :. . Previous cardiac testing date:results:date: results:ECG reviewed date: results:NSR. date: results:          METS/Exercise Tolerance:  >4 METS   Hematologic:  - neg hematologic  ROS       Musculoskeletal:  - neg musculoskeletal ROS       GI/Hepatic:  - neg GI/hepatic ROS       Renal/Genitourinary:     (+) Nephrolithiasis ,       Endo:  - neg endo ROS       Psychiatric: Comment: Severe anxiety and OCD.    (+) psychiatric history anxiety and other (comment)      Infectious Disease:  - neg infectious disease ROS       Malignancy:         Other:                         PHYSICAL EXAM:   Mental Status/Neuro: A/A/O   Airway: Facies:  "Feasible  Mallampati: I  Mouth/Opening: Full  TM distance: > 6 cm  Neck ROM: Full   Respiratory: Auscultation: CTAB     Resp. Rate: Normal     Resp. Effort: Normal      CV: Rhythm: Regular  Rate: Age appropriate  Heart: Normal Sounds   Comments:      Dental: Normal                  Lab Results   Component Value Date    WBC 7.8 11/16/2018    HGB 15.9 (H) 11/16/2018    HCT 46.3 11/16/2018     11/16/2018     11/29/2018    POTASSIUM 4.4 11/29/2018    CHLORIDE 102 11/29/2018    CO2 30 11/29/2018    BUN 20 11/29/2018    CR 0.74 11/29/2018    GLC 90 11/29/2018    JOSE 9.6 11/29/2018    PHOS 3.4 11/29/2018    MAG 2.2 11/29/2018    ALBUMIN 4.9 11/16/2018    PROTTOTAL 8.3 11/16/2018    ALT 17 11/16/2018    AST 11 11/16/2018    ALKPHOS 74 11/16/2018    BILITOTAL 0.4 11/16/2018    TSH 5.89 (H) 11/16/2018    T4 0.93 11/16/2018    HCG Negative 11/16/2018       Preop Vitals  BP Readings from Last 3 Encounters:   12/24/18 112/64   03/03/17 126/84    Pulse Readings from Last 3 Encounters:   12/24/18 89   03/03/17 99      Resp Readings from Last 3 Encounters:   12/26/18 16   03/03/17 16    SpO2 Readings from Last 3 Encounters:   12/26/18 97%   03/03/17 96%      Temp Readings from Last 1 Encounters:   12/26/18 36.3  C (97.4  F) (Tympanic)    Ht Readings from Last 1 Encounters:   11/16/18 1.575 m (5' 2\")      Wt Readings from Last 1 Encounters:   12/25/18 43.4 kg (95 lb 9.6 oz)    Estimated body mass index is 17.49 kg/m  as calculated from the following:    Height as of 11/16/18: 1.575 m (5' 2\").    Weight as of 12/25/18: 43.4 kg (95 lb 9.6 oz).     LDA:            Assessment:   ASA SCORE: 2       Documentation: H&P complete; Preop Testing complete; Consents complete   Proceeding: Proceed without further delay  Tobacco Use:  NO Active use of Tobacco/UNKNOWN Tobacco use status     Plan:   Anes. Type:  General   Pre-Induction: Midazolam IV; Acetaminophen PO   Induction:  IV (Standard)   Airway: Oral ETT   Access/Monitoring: " PIV   Maintenance: Balanced   Emergence: Procedure Site   Logistics: Same Day Surgery     Postop Pain/Sedation Strategy:  Standard-Options: Opioids PRN     PONV Management:  Adult Risk Factors: Female, Non-Smoker, Postop Opioids                             Xi Sierra MD

## 2018-12-26 NOTE — PLAN OF CARE
BEHAVIORAL TEAM DISCUSSION    Participants: Joe Dillon MD, Janey KAUFMAN [facility]   Progress: Pt has shown some progress, showered yesterday for the first time since prior to admission. Pt does not attend therapeutic group programming on unit.   Continued Stay Criteria/Rationale: Pt is receiving inpatient ECT will continue as progress is being shown.    Medical/Physical: see medical consults   Precautions:   Behavioral Orders   Procedures    Code 1 - Restrict to Unit    Code 2     Only during ECT days (Mon, Wed, Fri)    Electroconvulsive therapy     ECT every Monday, Wednesday, and Friday    Electroconvulsive therapy     Series of up to 12 treatments. Begin Date: 12/7/18     Treating Psychiatrist providing ECT:  Kathleen     Notified on:  12/5/18    Fall precautions    Routine Programming     As clinically indicated    Status 15     Every 15 minutes.     Plan: Pt will continue with inpatient ECT. Pt has appointments set up with psychiatrist, and will be set up with PCP once a clearer discharge date is known.   Rationale for change in precautions or plan: No change, continue with plan of care.

## 2018-12-26 NOTE — PLAN OF CARE
"Pt had ECT this am. Pt came back to unit and asked the same question multiple times. Pt appears extremely anxious. Everything we talk about she questions it. Pt only drank the boost for breakfast. For lunch she ate a chicken sandwich, milk and ice cream. Pt is very concerned that the Paxil we have for her here is in liquid and not pill form. Spoke with the [facility] and she said we can't get the name brand in pill form, only liquid. Tried several times to explain this to the pt, pt is not understanding. Finally pt seems to understand enough about the medication to take it. Pt said she \"feels scared and not sure if that is normal.\" Explained to pt that ECT will make people tired which will in turn make other feelings come out. Suggested pt go take a nap. Pt denies SI/SIB/and HI. Pt has depression and  High anxiety. Will continue to monitor for safety.   "

## 2018-12-26 NOTE — PROGRESS NOTES
[facility], [place]   Psychiatric Progress Note      Impression:   Gwen Cash is a 53 year old female admitted for severe depression and weight loss of 30 lbs. We started ECT on 12/7 to improve severe anxiety, OCD.  We are adjusting medications to target mood.  We are also working with the patient on therapeutic skill building.             Diagnoses and Plan:       Principal Diagnosis: MDD, recurrent, severe without psychotic features. OCD  Unit: station 10  Attending: Santana  Medications: risks/benefits discussed with patient-    - Paxil  40mg daily for compulsive behaviors, anxiety, depression  - Wellbutrin 100mg AM   -klonopin 0.5mg HS   -synthroid 25 mcg daily  - N-Acetyl cysteine 500mg daily  Laboratory/Imaging:  - Upreg neg, CBC wnl and BMP WNL  Consults:  -  Internal medicine, to evaluate synthroid dose.  Patient will be treated in therapeutic milieu with appropriate individual and group therapies as described.     ECT #8 performed today.  Next ECT scheduled on 12/28, Friday     Medical diagnoses to be addressed this admission:   Weight loss of 30 lbs  Hypothyroidism      Legal Status: Voluntary      Safety Assessment:   Checks: Status 15  Precautions: Suicide  Pt has not required locked seclusion or restraints in the past 24 hours to maintain safety, please refer to RN documentation for further details.    The risks, benefits, alternatives and side effects have been discussed and are understood by the patient and other caregivers.     Target symptoms to stabilize: SI, depressed, neurovegetative symptoms, sleep issues and disordered eating  Target disposition: outpatient provider           Attestation:  Patient has been seen and evaluated by me,  Joe Dillon MD          Interim History:   The patient's care was discussed with the treatment team and chart notes were reviewed.    In team meeting today staff discussed that patient continues to be highly anxious -about  "everything. Finally, after more than one month since admission, she showered. This was due to the ECT staff being unable to  electrode to her forehead due to poor hygiene. Staff discussed that patient's family are somewhat enabling patient's anxiety and dependency.   This morning, she was called to ECT a little past 8 AM, and her sister was upset that she could not be there with the patient, as she thought ECT would be given after 11 AM. We discussed how we should start encouraging the patient to be less dependent on the family members- after all, most patients go to ECT by themselves.   Pt continues to be extremely anxious about most things to the point of patient being frozen to do anything. Fear prevents her from doing things, including taking shower. There may be a little improvement in her depressed mood. Staff observes that the patient is out of her room a little more now.   We will assign a roommate for the patient, as she took shower and hygiene has improved.      The 10 point Review of Systems is negative other than noted in the HPI         Medications:       acetylcysteine  500 mg Oral Daily     buPROPion  100 mg Oral Daily     clonazePAM  0.5 mg Oral Daily     influenza vaccine adult (product based on age)  0.5 mL Intramuscular Prior to discharge     levothyroxine  25 mcg Oral QAM AC     PAXIL  40 mg Oral Daily with lunch             Allergies:     Allergies   Allergen Reactions     Metronidazole Unknown     Abstracted data , patient cannot remember      No Clinical Screening - See Comments Hives     Penicillins Unknown     Abstracted data , patient cannot remember      Phenylbutazones             Psychiatric Examination:   /64   Pulse 89   Temp 97.4  F (36.3  C) (Tympanic)   Resp 16   Ht 1.575 m (5' 2\")   Wt 43.4 kg (95 lb 9.6 oz)   SpO2 97%   BMI 17.49 kg/m    Weight is 95 lbs 9.6 oz  Body mass index is 17.49 kg/m .    Appearance:  Think built, in casual attire  Attitude:  " Cooperative, perseverative,   Eye Contact:  fair  Mood:  Highly anxious, depressed  Affect:  restricted  Speech:  coherent  Psychomotor Behavior:  No evidence of tardive dyskinesia, no tics, no tremor  Thought Process:  perseverative  Associations:  No loose associations  Thought Content:  No psychotic thoughts. Obsessive about her own fear.   No SI. No HI. No AH. No VH.  Insight:  limited  Judgment: limited   Oriented to:  Time place person  Attention Span and Concentration:  limited  Recent and Remote Memory:  fair  Language: able to name objects  Fund of Knowledge: fair  Muscle Strength and Tone: normal  Gait and Station: normal         Labs:   No results found for this or any previous visit (from the past 24 hour(s)).

## 2018-12-26 NOTE — PROCEDURES
Procedure/Surgery Information   [facility], [place]    Bedside Procedure Note  Date of Service (when I performed the procedure): 12/26/2018    Gwen Cash is a 53 year old female patient.  1. Major depressive disorder, remission status unspecified, unspecified whether recurrent    2. Severe recurrent major depression without psychotic features (H)      Past Medical History:   Diagnosis Date     Calculus of right kidney 1997    had hydronephrosis & treated for mild corinna at that time, calcium phosphate stone s/p lithotripsy      Chronic depressive personality disorder      DUB (dysfunctional uterine bleeding) 2004    work up including tsh ,labs, ecc , gyn eval normal      H/O echocardiogram 11/2011    at [facility] [employer] was normal , no valvular issues, done for functional benign murmur noted in past      History of Papanicolaou smear of cervix 9/14/01, 6/21/04    NIL     OCD (obsessive compulsive disorder) 3/4/2017     Panic disorder without agoraphobia 3/4/2017     Tobacco use disorder 3/4/2017     Temp: 97.4  F (36.3  C) Temp src: Tympanic       Resp: 16 SpO2: 97 % O2 Device: None (Room air)      Procedures     Melo Wang     [facility], [place]   ECT Procedure Note   12/26/2018    Gwen Cash [id number]   53 year old 1965     Patient Status: Inpatient    Is this the first in a series of 12 treatments?  No    History and Physical: Reviewed in medical record    Consent: Informed consent was signed by: patient    Date Consent Signed: 12/7/18      Allergies   Allergen Reactions     Metronidazole Unknown     Abstracted data , patient cannot remember      No Clinical Screening - See Comments Hives     Penicillins Unknown     Abstracted data , patient cannot remember      Phenylbutazones        Weight:  95 lbs 9.6 oz              Indications for ECT:   Medications ineffective         Clinical Narrative:   Patient was admitted with severe depression  and OCD.   She's continuing ECT for depression.  NPO after 2400.  Alert and Oriented x 3.   She's depressed and anxious but getting better.  No problems with the last ECT.           Diagnosis:   Major depression         Pause for the Cause:     Right patient Yes   Right procedure/laterality settings: Yes          Intra-Procedure Documentation:       ECT #: 6   Treatment number this series: 6   Total treatment number: 6     Type of ECT:  Right, unilateral ultrabrief    ECT Medications:   Lidocaine:  40mg   Brevital: 60mg  Caffeine: 500mg (after she's asleep)  Succinyl Choline: 60mg    ECT Strip Summary:   Energy Level: 50 percent  Motor Seizure Duration: 34 seconds  EEG Seizure Duration:  50 seconds    Complications: No    Plan:   Next ECT 12/28/18    Melo Wang MD

## 2018-12-27 PROCEDURE — 12400007 ZZH R&B MH INTERMEDIATE UMMC

## 2018-12-27 PROCEDURE — 25000132 ZZH RX MED GY IP 250 OP 250 PS 637: Performed by: PSYCHIATRY & NEUROLOGY

## 2018-12-27 PROCEDURE — 25000132 ZZH RX MED GY IP 250 OP 250 PS 637: Performed by: PHYSICIAN ASSISTANT

## 2018-12-27 PROCEDURE — 25000132 ZZH RX MED GY IP 250 OP 250 PS 637: Performed by: EMERGENCY MEDICINE

## 2018-12-27 RX ADMIN — MELATONIN TAB 3 MG 3 MG: 3 TAB at 22:35

## 2018-12-27 RX ADMIN — BUPROPION HYDROCHLORIDE 100 MG: 100 TABLET, FILM COATED ORAL at 14:00

## 2018-12-27 RX ADMIN — PAROXETINE HYDROCHLORIDE 40 MG: 10 SUSPENSION ORAL at 14:00

## 2018-12-27 RX ADMIN — Medication 500 MG: at 14:00

## 2018-12-27 RX ADMIN — LEVOTHYROXINE SODIUM 25 MCG: 25 TABLET ORAL at 08:29

## 2018-12-27 RX ADMIN — CLONAZEPAM 0.5 MG: 0.5 TABLET ORAL at 14:00

## 2018-12-27 ASSESSMENT — ACTIVITIES OF DAILY LIVING (ADL)
LAUNDRY: WITH SUPERVISION
HYGIENE/GROOMING: INDEPENDENT
ORAL_HYGIENE: INDEPENDENT
DRESS: INDEPENDENT

## 2018-12-27 NOTE — PROGRESS NOTES
Pt had a calm shift. She became tearful at times. Pt presents as tense. She denies psychotic symptoms, SI & SIB. Pt reports feeling a sensation of weakness at times. She would like the doctor to discuss this occurrence next time they meet. Pt's family visited. She reports this visit went well. Pt reports depression at 4-5 and anxiety at 7-8 on a scale of 1-10.      12/26/18 2000   Behavioral Health   Hallucinations denies / not responding to hallucinations   Thinking poor concentration;distractable   Orientation person: oriented;place: oriented   Memory new learning, recall loss   Insight poor   Judgement impaired   Eye Contact at examiner   Affect tense   Mood mood is calm  (tearful at times)   Physical Appearance/Attire appears older than stated age   Hygiene other (see comment)  (adequate, recently showered )   Suicidality other (see comments)  (pt denies )   1. Wish to be Dead No   2. Non-Specific Active Suicidal Thoughts  No   3. Active Sucidal Ideation with any Methods (Not Plan) Without Intent to Act  No   4. Active Suicidal Ideation with Some Intent to Act, Without Specific Plan  No   5. Active Suicidal Ideation with Specific Plan and Intent  No   Self Injury other (see comment)  (pt denies )   Elopement (no behaviors observed )   Activity withdrawn  (partially visible in milieu )   Speech clear;coherent   Psychomotor / Gait balanced;steady   Activities of Daily Living   Hygiene/Grooming prompts   Oral Hygiene independent   Dress independent   Room Organization independent

## 2018-12-27 NOTE — PROGRESS NOTES
"CLINICAL NUTRITION SERVICES - REASSESSMENT NOTE     Nutrition Prescription    RECOMMENDATIONS FOR MDs/PROVIDERS TO ORDER:  1. Strongly recommend trialing appetite stimulant given continuation of poor appetite for prolonged period of time, significant wt loss over past year, and difficulty putting on wt this admission despite intakes.   2. Please continue to encourage po intakes and goal of 2 Boost/day.    Malnutrition Status:    Non-severe malnutrition in the context of environmental or social circumstances     Recommendations already ordered by Registered Dietitian (RD):  - Continue Boost BID with meals  - Continue Magic Cup and Gelatein with lunch and dinner    Future/Additional Recommendations:  1. Monitor po intakes and wt trends. Consider need to add/discontinue supplement or adjust flavors pending trends and pt preferences.    2. Continue to encourage 2 Boost/day     EVALUATION OF THE PROGRESS TOWARD GOALS   Diet: Regular  Snacks/supplements: Magic Cup (moose) with lunch, Gelatein (cherry) with dinner; Boost Plus TID (strawberry with breakfast & dinner, moose with lunch)  Intake: Appetite same (poor-fair), forcing self to eat at times. Pt skips breakfast at baseline. Reports eating ~75% of lunch and dinner daily. Per chart notes, pt at times skipping meals and declining Boost d/t \"feeling achy.\" Drinking 2 Boost/day on avg - pt feels that 3 is too much. Wants to continue with Gelatein and Magic cup also.     NEW FINDINGS   - Wt: Pt has been trying to gain wt over admission. She is down 2 lb over the past 5 days, but wt stable from 1 week ago and from admission. Underweight BMI category. Pt had reported UBW of 125 lb. Per previous assessment, She states that in Jan/Feb, she went to the doctor and was 119 lbs. No recent wt history available in CareEverywhere, making it difficult to quantify wt loss. Per pt's report, wt loss of at least 22 lb (18%) over the past ~11 months. She is concerned about her wt loss and " inability to gain wt.  - Labs and meds reviewed (last labs 11/29)  - Continues with ECT    MALNUTRITION   % Intake: Decreased intake does not meet criteria  % Weight Loss: Up to 20% in 1 year (non-severe)  Subcutaneous Fat Loss: Facial region: Mild-moderate  Muscle Loss: Temporal, Facial & jaw region, Scapular bone and Thoracic region (clavicle, acromium bone, deltoid, trapezius, pectoral): Moderate  Fluid Accumulation/Edema: None noted  Malnutrition Diagnosis: Non-severe malnutrition in the context of environmental or social circumstances    Previous Goals   1. Patient to consume % of nutritionally adequate meal trays TID, or the equivalent with supplements/snacks.  Evaluation: Suspect met, or near met  2. BMI to progress towards Normal BMI status (>18.5 kg/m2).  Evaluation: Not met  3. Patient to drink 3 Boost/day.  Evaluation: Not met    Previous Nutrition Diagnosis  Inadequate oral intake related to poor appetite, decreasing desire to eat as evidenced by variable intakes at times and overall reduced intakes over the past several months, and wt loss over the past ~11 months.    Evaluation: No change    CURRENT NUTRITION DIAGNOSIS  Inadequate oral intake related to poor appetite, decreasing desire to eat as evidenced by variable intakes at times and overall reduced intakes over the past several months, and wt loss over the past ~11 months.      INTERVENTIONS  Implementation  - Given that pt will not drink 3 Boost/day, added supplements at lunch and dinner. Discussed potential of appetite stimulant and that RD would recommend to provider.  - Medical food supplement therapy - Continue Magic Cup and Gelatein supplements. Added Magic Cup to dinner and Gelatein to lunch. Changed Boost to BID per pt request.    Goals  1. Patient to consume % of nutritionally adequate meal trays TID, or the equivalent with supplements/snacks.  2. BMI to progress towards Normal BMI status (>18.5 kg/m2).  3. Patient to drink 3  Boost/day.    Monitoring/Evaluation  Progress toward goals will be monitored and evaluated per protocol.    Stephanie Trejo RD, LD  Unit pgr: [phone]

## 2018-12-27 NOTE — PLAN OF CARE
"Pt was isolative to her room most of the shift. She reports high anxiety related to having ECT tomorrow. She  states \"I am so worried about ECT\".  Pt was tearful when talking about ECT procedure. She rates anxiety and depression at 5/10. She denies any thoughts of self harm.   "

## 2018-12-28 ENCOUNTER — ANESTHESIA EVENT (OUTPATIENT)
Dept: BEHAVIORAL HEALTH | Facility: CLINIC | Age: 53
End: 2018-12-28

## 2018-12-28 ENCOUNTER — ANESTHESIA (OUTPATIENT)
Dept: BEHAVIORAL HEALTH | Facility: CLINIC | Age: 53
End: 2018-12-28

## 2018-12-28 PROCEDURE — 25000125 ZZHC RX 250: Performed by: ANESTHESIOLOGY

## 2018-12-28 PROCEDURE — 25000132 ZZH RX MED GY IP 250 OP 250 PS 637: Performed by: PSYCHIATRY & NEUROLOGY

## 2018-12-28 PROCEDURE — 12400007 ZZH R&B MH INTERMEDIATE UMMC

## 2018-12-28 PROCEDURE — 25000132 ZZH RX MED GY IP 250 OP 250 PS 637: Performed by: EMERGENCY MEDICINE

## 2018-12-28 PROCEDURE — 25000128 H RX IP 250 OP 636: Performed by: ANESTHESIOLOGY

## 2018-12-28 PROCEDURE — 25000125 ZZHC RX 250: Performed by: PSYCHIATRY & NEUROLOGY

## 2018-12-28 PROCEDURE — 99232 SBSQ HOSP IP/OBS MODERATE 35: CPT | Mod: 25 | Performed by: PSYCHIATRY & NEUROLOGY

## 2018-12-28 PROCEDURE — 90870 ELECTROCONVULSIVE THERAPY: CPT

## 2018-12-28 PROCEDURE — 99207 ZZC CDG-MDM COMPONENT: MEETS MODERATE - UP CODED: CPT | Performed by: PSYCHIATRY & NEUROLOGY

## 2018-12-28 PROCEDURE — 25000132 ZZH RX MED GY IP 250 OP 250 PS 637: Performed by: PHYSICIAN ASSISTANT

## 2018-12-28 RX ORDER — ONDANSETRON 2 MG/ML
4 INJECTION INTRAMUSCULAR; INTRAVENOUS EVERY 30 MIN PRN
Status: CANCELLED | OUTPATIENT
Start: 2018-12-28

## 2018-12-28 RX ORDER — CAFFEINE AND SODIUM BENZOATE 125 MG/ML
500 INJECTION, SOLUTION INTRAMUSCULAR; INTRAVENOUS
Status: CANCELLED
Start: 2018-12-28 | End: 2018-12-28

## 2018-12-28 RX ORDER — ONDANSETRON 4 MG/1
4 TABLET, ORALLY DISINTEGRATING ORAL EVERY 30 MIN PRN
Status: CANCELLED | OUTPATIENT
Start: 2018-12-28

## 2018-12-28 RX ORDER — MEPERIDINE HYDROCHLORIDE 25 MG/ML
12.5 INJECTION INTRAMUSCULAR; INTRAVENOUS; SUBCUTANEOUS
Status: CANCELLED | OUTPATIENT
Start: 2018-12-28

## 2018-12-28 RX ORDER — SODIUM CHLORIDE, SODIUM LACTATE, POTASSIUM CHLORIDE, CALCIUM CHLORIDE 600; 310; 30; 20 MG/100ML; MG/100ML; MG/100ML; MG/100ML
INJECTION, SOLUTION INTRAVENOUS CONTINUOUS
Status: CANCELLED | OUTPATIENT
Start: 2018-12-28

## 2018-12-28 RX ORDER — NALOXONE HYDROCHLORIDE 0.4 MG/ML
.1-.4 INJECTION, SOLUTION INTRAMUSCULAR; INTRAVENOUS; SUBCUTANEOUS
Status: CANCELLED | OUTPATIENT
Start: 2018-12-28 | End: 2018-12-29

## 2018-12-28 RX ORDER — LIDOCAINE HYDROCHLORIDE 20 MG/ML
40 INJECTION, SOLUTION EPIDURAL; INFILTRATION; INTRACAUDAL; PERINEURAL
Status: COMPLETED | OUTPATIENT
Start: 2018-12-28 | End: 2018-12-28

## 2018-12-28 RX ORDER — CAFFEINE AND SODIUM BENZOATE 125 MG/ML
500 INJECTION, SOLUTION INTRAMUSCULAR; INTRAVENOUS
Status: COMPLETED | OUTPATIENT
Start: 2018-12-28 | End: 2018-12-28

## 2018-12-28 RX ADMIN — Medication 500 MG: at 14:23

## 2018-12-28 RX ADMIN — METHOHEXITAL SODIUM 60 MG: 500 INJECTION, POWDER, LYOPHILIZED, FOR SOLUTION INTRAMUSCULAR; INTRAVENOUS; RECTAL at 09:40

## 2018-12-28 RX ADMIN — LIDOCAINE HYDROCHLORIDE 40 MG: 20 INJECTION, SOLUTION EPIDURAL; INFILTRATION; INTRACAUDAL; PERINEURAL at 09:41

## 2018-12-28 RX ADMIN — BUPROPION HYDROCHLORIDE 100 MG: 100 TABLET, FILM COATED ORAL at 14:23

## 2018-12-28 RX ADMIN — MELATONIN TAB 3 MG 3 MG: 3 TAB at 22:34

## 2018-12-28 RX ADMIN — CAFFEINE AND SODIUM BENZOATE 500 MG: 125 INJECTION, SOLUTION INTRAMUSCULAR; INTRAVENOUS at 09:42

## 2018-12-28 RX ADMIN — PAROXETINE HYDROCHLORIDE 40 MG: 10 SUSPENSION ORAL at 14:24

## 2018-12-28 RX ADMIN — LEVOTHYROXINE SODIUM 25 MCG: 25 TABLET ORAL at 10:50

## 2018-12-28 RX ADMIN — Medication 60 MG: at 09:40

## 2018-12-28 RX ADMIN — CLONAZEPAM 0.5 MG: 0.5 TABLET ORAL at 14:23

## 2018-12-28 ASSESSMENT — LIFESTYLE VARIABLES: TOBACCO_USE: 1

## 2018-12-28 ASSESSMENT — ACTIVITIES OF DAILY LIVING (ADL)
ORAL_HYGIENE: INDEPENDENT
DRESS: INDEPENDENT
LAUNDRY: WITH SUPERVISION
HYGIENE/GROOMING: PROMPTS

## 2018-12-28 NOTE — PROGRESS NOTES
"Pt had ECT #8 today. Another ECT is scheduled for Monday dec 31st. Pt has been anxious about the ECT side-effects, specifically a \"weakness feeling\"; pt was encouraged to lie down but she has been mainly in the dining room talking to people that sit with her. Pt has not gone to any groups.     12/28/18 4736   Behavioral Health   Hallucinations denies / not responding to hallucinations   Thinking intact   Orientation time: oriented;date: oriented;person: oriented;place: oriented   Memory baseline memory   Insight insight appropriate to events   Judgement impaired   Eye Contact at examiner   Affect (mid-range)   Mood anxious   Physical Appearance/Attire appears stated age;attire appropriate to age and situation   Hygiene (fair)   Suicidality (denies)   1. Wish to be Dead No   2. Non-Specific Active Suicidal Thoughts  No   Self Injury (denies)   Elopement (WDL) WDL   Activity withdrawn   Speech coherent;clear   Medication Sensitivity no stated side effects;no observed side effects   Psychomotor / Gait balanced;steady   Coping/Psychosocial   Verbalized Emotional State anxiety   Safety   Suicidality Status 15   Activities of Daily Living   Hygiene/Grooming prompts   Oral Hygiene independent   Dress independent   Laundry with supervision   Room Organization independent    Pt rated her depression a \"3\" and her anxiety a \"5-6\". Pt denies SI and SIB thoughts.   "

## 2018-12-28 NOTE — PROCEDURES
Procedure/Surgery Information   [facility], [place]    Bedside Procedure Note  Date of Service (when I performed the procedure): 12/28/2018    Gwen Cash is a 53 year old female patient.  1. Major depressive disorder, remission status unspecified, unspecified whether recurrent    2. Severe recurrent major depression without psychotic features (H)      Past Medical History:   Diagnosis Date     Calculus of right kidney 1997    had hydronephrosis & treated for mild corinna at that time, calcium phosphate stone s/p lithotripsy      Chronic depressive personality disorder      DUB (dysfunctional uterine bleeding) 2004    work up including tsh ,labs, ecc , gyn eval normal      H/O echocardiogram 11/2011    at [facility] was normal , no valvular issues, done for functional benign murmur noted in past      History of Papanicolaou smear of cervix 9/14/01, 6/21/04    NIL     OCD (obsessive compulsive disorder) 3/4/2017     Panic disorder without agoraphobia 3/4/2017     Tobacco use disorder 3/4/2017     Temp: 97.7  F (36.5  C) Temp src: Oral BP: 126/77 Pulse: 67              Procedures     Melo Wang     [facility], [place]   ECT Procedure Note   12/28/2018    Gwen Cash [id number]   53 year old 1965     Patient Status: Inpatient    Is this the first in a series of 12 treatments?  No    History and Physical: Reviewed in medical record    Consent: Informed consent was signed by: patient    Date Consent Signed: 12/7/18      Allergies   Allergen Reactions     Metronidazole Unknown     Abstracted data , patient cannot remember      No Clinical Screening - See Comments Hives     Penicillins Unknown     Abstracted data , patient cannot remember      Phenylbutazones        Weight:  95 lbs 9.6 oz              Indications for ECT:   Medications ineffective         Clinical Narrative:   Patient was admitted with severe depression and OCD.   She's continuing  ECT for depression.  NPO after 2400.  Alert and Oriented x 3.   Mood is doing better.  Family and patient feel she is not baseline.   No problems with the last ECT.           Diagnosis:   Major depression         Pause for the Cause:     Right patient Yes   Right procedure/laterality settings: Yes          Intra-Procedure Documentation:       ECT #: 7   Treatment number this series: 7   Total treatment number: 7     Type of ECT:  Right, unilateral ultrabrief     ECT Medications:   Lidocaine:  40mg   Brevital: 60mg  Caffeine: 500mg (after she's asleep)  Succinyl Choline: 60mg    ECT Strip Summary:   Energy Level: 55 percent  Motor Seizure Duration:  36 seconds  EEG Seizure Duration:   55 seconds    Complications: No    Plan:   Next ECT 12/31/18    Melo Wang MD

## 2018-12-28 NOTE — ANESTHESIA PREPROCEDURE EVALUATION
Anesthesia Pre-Procedure Evaluation    Patient: Gwen Cash   MRN:     [id number] Gender:   female   Age:    53 year old :      1965        Preoperative Diagnosis: * No pre-op diagnosis entered *   * No procedures listed *     Past Medical History:   Diagnosis Date     Calculus of right kidney     had hydronephrosis & treated for mild corinna at that time, calcium phosphate stone s/p lithotripsy      Chronic depressive personality disorder      DUB (dysfunctional uterine bleeding)     work up including tsh ,labs, ecc , gyn eval normal      H/O echocardiogram 2011    at [facility] was normal , no valvular issues, done for functional benign murmur noted in past      History of Papanicolaou smear of cervix 01, 04    NIL     OCD (obsessive compulsive disorder) 3/4/2017     Panic disorder without agoraphobia 3/4/2017     Tobacco use disorder 3/4/2017      Past Surgical History:   Procedure Laterality Date      SECTION      x 3     LITHOTRIPSY            Anesthesia Evaluation     . Pt has had prior anesthetic. Type: General and Regional    No history of anesthetic complications          ROS/MED HX    ENT/Pulmonary:     (+)tobacco use, , . .    Neurologic:  - neg neurologic ROS     Cardiovascular:  - neg cardiovascular ROS   (+) ----. : . . . :. . Previous cardiac testing date:results:date: results:ECG reviewed date: results:NSR. date: results:          METS/Exercise Tolerance:  >4 METS   Hematologic:  - neg hematologic  ROS       Musculoskeletal:  - neg musculoskeletal ROS       GI/Hepatic:  - neg GI/hepatic ROS       Renal/Genitourinary:     (+) Nephrolithiasis ,       Endo:  - neg endo ROS       Psychiatric: Comment: Severe anxiety and OCD.    (+) psychiatric history anxiety and other (comment)      Infectious Disease:  - neg infectious disease ROS       Malignancy:         Other:                         PHYSICAL EXAM:   Mental Status/Neuro: A/A/O   Airway: Facies:  "Feasible  Mallampati: I  Mouth/Opening: Full  TM distance: > 6 cm  Neck ROM: Full   Respiratory: Auscultation: CTAB     Resp. Rate: Normal     Resp. Effort: Normal      CV: Rhythm: Regular  Rate: Age appropriate  Heart: Normal Sounds   Comments:      Dental: Normal                  Lab Results   Component Value Date    WBC 7.8 11/16/2018    HGB 15.9 (H) 11/16/2018    HCT 46.3 11/16/2018     11/16/2018     11/29/2018    POTASSIUM 4.4 11/29/2018    CHLORIDE 102 11/29/2018    CO2 30 11/29/2018    BUN 20 11/29/2018    CR 0.74 11/29/2018    GLC 90 11/29/2018    JOSE 9.6 11/29/2018    PHOS 3.4 11/29/2018    MAG 2.2 11/29/2018    ALBUMIN 4.9 11/16/2018    PROTTOTAL 8.3 11/16/2018    ALT 17 11/16/2018    AST 11 11/16/2018    ALKPHOS 74 11/16/2018    BILITOTAL 0.4 11/16/2018    TSH 5.89 (H) 11/16/2018    T4 0.93 11/16/2018    HCG Negative 11/16/2018       Preop Vitals  BP Readings from Last 3 Encounters:   12/27/18 126/77   03/03/17 126/84    Pulse Readings from Last 3 Encounters:   12/27/18 67   03/03/17 99      Resp Readings from Last 3 Encounters:   12/28/18 18   03/03/17 16    SpO2 Readings from Last 3 Encounters:   12/28/18 97%   03/03/17 96%      Temp Readings from Last 1 Encounters:   12/28/18 36.9  C (98.4  F) (Tympanic)    Ht Readings from Last 1 Encounters:   11/16/18 1.575 m (5' 2\")      Wt Readings from Last 1 Encounters:   12/25/18 43.4 kg (95 lb 9.6 oz)    Estimated body mass index is 17.49 kg/m  as calculated from the following:    Height as of 11/16/18: 1.575 m (5' 2\").    Weight as of 12/25/18: 43.4 kg (95 lb 9.6 oz).     LDA:  Peripheral IV 12/28/18 Right Lower forearm (Active)   Number of days: 0            Assessment:   ASA SCORE: 2       Documentation: H&P complete; Preop Testing complete; Consents complete   Proceeding: Proceed without further delay  Tobacco Use:  NO Active use of Tobacco/UNKNOWN Tobacco use status     Plan:   Anes. Type:  General   Pre-Induction: Midazolam IV; Acetaminophen " PO   Induction:  IV (Standard)   Airway: Oral ETT   Access/Monitoring: PIV   Maintenance: Balanced   Emergence: Procedure Site   Logistics: Same Day Surgery     Postop Pain/Sedation Strategy:  Standard-Options: Opioids PRN     PONV Management:  Adult Risk Factors: Female, Non-Smoker, Postop Opioids                             Jake Blandon MD

## 2018-12-28 NOTE — ANESTHESIA POSTPROCEDURE EVALUATION
Anesthesia POST Procedure Evaluation    Patient: Gwen Cash   MRN:     [id number] Gender:   female   Age:    53 year old :      1965        Preoperative Diagnosis: * No pre-op diagnosis entered *   * No procedures listed *   Postop Comments: No value filed.       Anesthesia Type:  General    Reportable Event: NO     PAIN: Uncomplicated   Sign Out status: Comfortable, Well controlled pain     PONV: No PONV   Sign Out status:  No Nausea or Vomiting     Neuro/Psych: Uneventful perioperative course   Sign Out Status: Preoperative baseline; Age appropriate mentation     Airway/Resp.: Uneventful perioperative course   Sign Out Status: Non labored breathing, age appropriate RR; Resp. Status within EXPECTED Parameters     CV: Uneventful perioperative course   Sign Out status: Appropriate BP and perfusion indices; Appropriate HR/Rhythm     Disposition:   Sign Out in:  PACU  Disposition:  Phase II; Home  Recovery Course: Uneventful  Follow-Up: Not required           Last Anesthesia Record Vitals:  CRNA VITALS  2018 0916 - 2018 0946      2018             Pulse:  107    Ht Rate:  108    SpO2:  99 %    Resp Rate (set):  8          Last PACU/Preop Vitals:  Vitals:    18 0948 18 1003 18 1018   BP: 160/86 167/71 151/89   Pulse:      Resp: 12   Temp: 36.6  C (97.9  F)  37  C (98.6  F)   SpO2: 100% 99% 99%         Electronically Signed By: Jake Blandon MD, 2018, 10:16 AM

## 2018-12-28 NOTE — PROGRESS NOTES
Pt discharged from the recovery room via wheelchair back to station 10N with staff at 1035 .  Report given to station 10N RN,  Pamela.                 .

## 2018-12-28 NOTE — PROGRESS NOTES
Writer rescheduled psychiatric medication management appointment initially scheduled for 1/10/19 to the following date and time (info also located on Pt's AVS):    - Medication Management:   Mercedes Aguero DNP  [facility]  [address] [place], [place] [address] [address] [address]  Phone:  [phone]  Fax:  [phone] [facility]UC PLEASE FAX DISCHARGE INSTRUCTIONS   *You have a psychiatric medication management with Dr. Mercedes Aguero DNP scheduled for Monday, February 4th @ 2:50 pm

## 2018-12-29 PROCEDURE — 25000132 ZZH RX MED GY IP 250 OP 250 PS 637: Performed by: PSYCHIATRY & NEUROLOGY

## 2018-12-29 PROCEDURE — 25000132 ZZH RX MED GY IP 250 OP 250 PS 637: Performed by: PHYSICIAN ASSISTANT

## 2018-12-29 PROCEDURE — 25000132 ZZH RX MED GY IP 250 OP 250 PS 637: Performed by: EMERGENCY MEDICINE

## 2018-12-29 PROCEDURE — 12400007 ZZH R&B MH INTERMEDIATE UMMC

## 2018-12-29 RX ADMIN — PAROXETINE HYDROCHLORIDE 40 MG: 10 SUSPENSION ORAL at 14:50

## 2018-12-29 RX ADMIN — BUPROPION HYDROCHLORIDE 100 MG: 100 TABLET, FILM COATED ORAL at 14:34

## 2018-12-29 RX ADMIN — CLONAZEPAM 0.5 MG: 0.5 TABLET ORAL at 14:34

## 2018-12-29 RX ADMIN — LEVOTHYROXINE SODIUM 25 MCG: 25 TABLET ORAL at 09:30

## 2018-12-29 RX ADMIN — Medication 500 MG: at 14:34

## 2018-12-29 ASSESSMENT — ACTIVITIES OF DAILY LIVING (ADL)
HYGIENE/GROOMING: INDEPENDENT;PROMPTS
HYGIENE/GROOMING: PROMPTS
ORAL_HYGIENE: INDEPENDENT;PROMPTS
LAUNDRY: UNABLE TO COMPLETE
ORAL_HYGIENE: PROMPTS
LAUNDRY: WITH SUPERVISION
DRESS: INDEPENDENT
DRESS: INDEPENDENT

## 2018-12-29 NOTE — PLAN OF CARE
Pt presents as calm, polite and cooperative during check in. Is lying in bed, disheveled. Alert and oriented x 4. Denies any suicidal ideation or thoughts to harm self and/or others. Has stated to staff she believes ETC is helping. Has been withdrawn and isolative this afternoon and appears tired. Denies any questions or concerns at this time and agrees to notify staff as needed.

## 2018-12-29 NOTE — PLAN OF CARE
"\"I think ECT is helping, I feel a little less depressed.  I think I have more energy, it's hard to tell.\"  Rates depression at a 4 on a scale with 10 being the worst.  Feels anxiety is also somewhat less.  Continues to deny suicidal ideation which has denied since admission.  Indicates feels is spending more time in the lounge.  States concentration and focus are good, denies racing or ruminating thoughts.  No overt memory loss or confusion noted.  Feels sleep is good although continues to stay in bed in the morning, doesn't get up for breakfast.  When asked about showering states sister helped her last weekend to take a shower.  States continues to need help with it.  Is wearing a new change of clothing, nails are cut, but still appear dirty.  Indicated may allow this staff to assist her in the shower today.  Was noted to smile a few times during brief one to one time this morning.  Returned to bed after this.  "

## 2018-12-30 PROCEDURE — 25000132 ZZH RX MED GY IP 250 OP 250 PS 637: Performed by: PSYCHIATRY & NEUROLOGY

## 2018-12-30 PROCEDURE — 12400007 ZZH R&B MH INTERMEDIATE UMMC

## 2018-12-30 PROCEDURE — 25000132 ZZH RX MED GY IP 250 OP 250 PS 637: Performed by: PHYSICIAN ASSISTANT

## 2018-12-30 PROCEDURE — 25000132 ZZH RX MED GY IP 250 OP 250 PS 637: Performed by: EMERGENCY MEDICINE

## 2018-12-30 RX ADMIN — PAROXETINE HYDROCHLORIDE 40 MG: 10 SUSPENSION ORAL at 13:02

## 2018-12-30 RX ADMIN — Medication 500 MG: at 13:02

## 2018-12-30 RX ADMIN — LEVOTHYROXINE SODIUM 25 MCG: 25 TABLET ORAL at 08:26

## 2018-12-30 RX ADMIN — BUPROPION HYDROCHLORIDE 100 MG: 100 TABLET, FILM COATED ORAL at 13:02

## 2018-12-30 RX ADMIN — CLONAZEPAM 0.5 MG: 0.5 TABLET ORAL at 13:02

## 2018-12-30 ASSESSMENT — ACTIVITIES OF DAILY LIVING (ADL)
ORAL_HYGIENE: PROMPTS
LAUNDRY: WITH SUPERVISION
ORAL_HYGIENE: PROMPTS
DRESS: INDEPENDENT;PROMPTS
LAUNDRY: WITH SUPERVISION
HYGIENE/GROOMING: PROMPTS
HYGIENE/GROOMING: PROMPTS
DRESS: INDEPENDENT

## 2018-12-30 ASSESSMENT — MIFFLIN-ST. JEOR: SCORE: 980.08

## 2018-12-30 NOTE — PROGRESS NOTES
"Pt has been in her room all day. Has only had one boost as of 1510. Pt told this writer and her nurse that she's \"not feeling well\" and does not want ECT tomorrow. After this writer talked with her she said she may decide to have it and she will \"see how [she] feels in the morning\". Pt cried during a check-in at 1345; \"I miss my family\". Pt rated her depression a \"5\" and her anxiety a \"2-3\". Pt denies SI and SIB thoughts.      12/30/18 1506   Behavioral Health   Hallucinations denies / not responding to hallucinations   Thinking intact   Orientation person: oriented;place: oriented;date: oriented;time: oriented   Memory baseline memory   Insight insight appropriate to situation   Judgement impaired   Eye Contact at examiner   Affect blunted, flat;sad   Mood depressed   Physical Appearance/Attire appears stated age   Hygiene neglected grooming - unclean body, hair, teeth   Suicidality (denies)   1. Wish to be Dead No   2. Non-Specific Active Suicidal Thoughts  No   Self Injury (denies)   Elopement (WDL) WDL   Activity isolative;withdrawn   Speech coherent;clear   Medication Sensitivity no stated side effects;no observed side effects   Psychomotor / Gait balanced;steady   Coping/Psychosocial   Verbalized Emotional State sadness   Safety   Suicidality Status 15   Activities of Daily Living   Hygiene/Grooming prompts   Oral Hygiene prompts   Dress independent   Laundry with supervision   Room Organization independent     "

## 2018-12-30 NOTE — PROGRESS NOTES
"\"I don't want to have ECT tomorrow.  I just don't feel good.  My stomach is bothering me and I feel achy all over.\"  Staff reassured that ECT would not be contraindicated for above complaints and that it remains a safe procedure.  Attempted to encourage patient to have it done so there would be less interruptions in treatment allowing them to be more beneficial.  None of this convinced patient to change her mind.  Did not eat any meal yesterday or take boost drinks.  States is not sure will eat today, but did agree to attempt to drink boost at noon and supper meals. Was asked if is fearful of taking a shower, states \"I'm just not feeling up to it.\"  After meeting with staff made a phone call and returned to room.  "

## 2018-12-31 PROCEDURE — 25000132 ZZH RX MED GY IP 250 OP 250 PS 637: Performed by: PSYCHIATRY & NEUROLOGY

## 2018-12-31 PROCEDURE — 25000132 ZZH RX MED GY IP 250 OP 250 PS 637: Performed by: PHYSICIAN ASSISTANT

## 2018-12-31 PROCEDURE — 25000132 ZZH RX MED GY IP 250 OP 250 PS 637: Performed by: EMERGENCY MEDICINE

## 2018-12-31 PROCEDURE — 12400007 ZZH R&B MH INTERMEDIATE UMMC

## 2018-12-31 RX ADMIN — CLONAZEPAM 0.5 MG: 0.5 TABLET ORAL at 13:25

## 2018-12-31 RX ADMIN — BUPROPION HYDROCHLORIDE 100 MG: 100 TABLET, FILM COATED ORAL at 13:25

## 2018-12-31 RX ADMIN — MELATONIN TAB 3 MG 3 MG: 3 TAB at 02:03

## 2018-12-31 RX ADMIN — Medication 500 MG: at 13:25

## 2018-12-31 RX ADMIN — MELATONIN TAB 3 MG 3 MG: 3 TAB at 21:30

## 2018-12-31 RX ADMIN — PAROXETINE HYDROCHLORIDE 40 MG: 10 SUSPENSION ORAL at 13:25

## 2018-12-31 RX ADMIN — MELATONIN TAB 3 MG 3 MG: 3 TAB at 19:30

## 2018-12-31 RX ADMIN — LEVOTHYROXINE SODIUM 25 MCG: 25 TABLET ORAL at 09:28

## 2018-12-31 ASSESSMENT — ACTIVITIES OF DAILY LIVING (ADL)
HYGIENE/GROOMING: PROMPTS
DRESS: INDEPENDENT
ORAL_HYGIENE: PROMPTS
LAUNDRY: WITH SUPERVISION

## 2018-12-31 NOTE — PROVIDER NOTIFICATION
Pt has been isolative in room. Affect mid range. C/o sadness due to missing her family. Rates depression at a 5, and anxiety at a 3 out of 10. Reports  is doing slightly better compared to admission and thinks ECT has been slightly effective. Denies SI/SIB. Reports feels hopeful, and plans to have ECT on Monday. For dinner tonight, pt ate a brownie and drank one boost. Needs prompting with ADL's       12/30/18 1942   Behavioral Health   Hallucinations denies / not responding to hallucinations   Thinking distractable;poor concentration   Orientation person: oriented;place: oriented   Memory baseline memory   Insight insight appropriate to events   Judgement impaired   Eye Contact at examiner   Affect blunted, flat;sad  (Reports she misses her family)   Mood depressed;anxious;mood is calm   Physical Appearance/Attire appears older than stated age   Hygiene neglected grooming - unclean body, hair, teeth   1. Wish to be Dead No   2. Non-Specific Active Suicidal Thoughts  No   3. Active Sucidal Ideation with any Methods (Not Plan) Without Intent to Act  No   4. Active Suicidal Ideation with Some Intent to Act, Without Specific Plan  No   5. Active Suicidal Ideation with Specific Plan and Intent  No   Self Injury other (see comment)  (Pt cureently denies)   Activity isolative;withdrawn   Speech clear;coherent   Medication Sensitivity no observed side effects;no stated side effects   Psychomotor / Gait balanced;steady   Emotion Mood WDL   Affect blunted;flat   Coping/Psychosocial   Verbalized Emotional State sadness;depression;anxiety   Safety   Suicidality Status 15   Fall Risk Interventions   High fall risk medications prescribed? yes   Fall precaution band in place? Yes   Activities of Daily Living   Hygiene/Grooming prompts   Oral Hygiene prompts   Dress independent;prompts   Laundry with supervision   Room Organization independent   Activity   Activity Assistance Provided independent

## 2018-12-31 NOTE — PROGRESS NOTES
Pt requested and received melatonin around 2 AM.  Writer asked how she was feeling about ECT in the morning and she restated that she didn't want to go this morning but that she will want to go on Wednesday.

## 2018-12-31 NOTE — PROGRESS NOTES
[facility], [place]   Psychiatric Progress Note      Impression:   Gwen Cash is a 53 year old female admitted for severe depression and weight loss of 30 lbs. We started ECT on 12/7 to improve severe anxiety, OCD.  We are adjusting medications to target mood.  We are also working with the patient on therapeutic skill building.             Diagnoses and Plan:       Principal Diagnosis: MDD, recurrent, severe without psychotic features. OCD  Unit: station 10  Attending: Santana  Medications: risks/benefits discussed with patient-    - Paxil  40mg daily for compulsive behaviors, anxiety, depression  - Wellbutrin 100mg AM   -klonopin 0.5mg HS   -synthroid 25 mcg daily  - N-Acetyl cysteine 500mg daily  Laboratory/Imaging:  - Upreg neg, CBC wnl and BMP WNL  Consults:  -  Internal medicine, to evaluate synthroid dose.  Patient will be treated in therapeutic milieu with appropriate individual and group therapies as described.     ECT #9 performed today.     Medical diagnoses to be addressed this admission:   Weight loss of 30 lbs  Hypothyroidism      Legal Status: Voluntary      Safety Assessment:   Checks: Status 15  Precautions: Suicide  Pt has not required locked seclusion or restraints in the past 24 hours to maintain safety, please refer to RN documentation for further details.    The risks, benefits, alternatives and side effects have been discussed and are understood by the patient and other caregivers.     Target symptoms to stabilize: SI, depressed, neurovegetative symptoms, sleep issues and disordered eating  Target disposition: outpatient provider     Attestation:  Patient has been seen and evaluated by me,  Joe Dillon MD          Interim History:   The patient's care was discussed with the treatment team and chart notes were reviewed.    In team meeting , staff reports that family members continue to come to the unit on the mornings that the pt is going to have ECT for  "emotional support.   But this may be preventing the patient from actually trying ( exposing herself) to the anxiety -provoking matter in her life and she continues to heavily depend on other people.   She went and received ECT this morning.   She reports that there may be a little improvement of depression and anxiety and she plans to get ECT this coming Monday.   She continues to be extremely anxious and perseverative, she needs prompting for ADL. She has a roommate now that she has taken the first shower after admission, and seems to be handling having a roommate ok.        The 10 point Review of Systems is negative other than noted in the HPI         Medications:       acetylcysteine  500 mg Oral Daily     buPROPion  100 mg Oral Daily     clonazePAM  0.5 mg Oral Daily     influenza vaccine adult (product based on age)  0.5 mL Intramuscular Prior to discharge     levothyroxine  25 mcg Oral QAM AC     PARoxetine  40 mg Oral Daily             Allergies:     Allergies   Allergen Reactions     Metronidazole Unknown     Abstracted data , patient cannot remember      No Clinical Screening - See Comments Hives     Penicillins Unknown     Abstracted data , patient cannot remember      Phenylbutazones             Psychiatric Examination:   BP 96/52   Pulse 82   Temp 98.7  F (37.1  C) (Tympanic)   Resp 16   Ht 1.575 m (5' 2\")   Wt 42.7 kg (94 lb 1.6 oz)   SpO2 97%   BMI 17.21 kg/m    Weight is 94 lbs 1.6 oz  Body mass index is 17.21 kg/m .    Appearance:  Hospital attire, appropriate groom  Attitude:  Cooperative, perseverative on her anxiety  Eye Contact:  fair  Mood:  Extremely anxious  Affect:  restricted  Speech:  coherent  Psychomotor Behavior:  No evidence of tardive dyskinesia, no tic, no tremor  Thought Process:  Perseverative.   Associations:  No loose association  Thought Content:  Obsession with her own anxiety. Denies SI. No HI. No AH. No VH  Insight:  limited  Judgment: limited   Oriented to: time place " person   Attention Span and Concentration: limited   Recent and Remote Memory:  fair  Language: able to name objects  Fund of Knowledge: normal  Muscle Strength and Tone: normal  Gait and Station: normal         Labs:   No results found for this or any previous visit (from the past 24 hour(s)).

## 2018-12-31 NOTE — PLAN OF CARE
"48 hour nursing assessment:  Pt evaluation continues. Assessed mood, anxiety, thoughts, and behavior. Is progressing towards goals. Encourage participation in groups and developing healthy coping skills. Pt denies auditory or visual  hallucinations. Refer to daily team meeting notes for individualized plan of care. Will continue to assess.    Vidya had an uneventful shift. She refused ECT stating she has GI distress, even after writer explained she should have the treatment anyway and offering to have someone from ECT she was familiar with tell her so. Pt continues to isolate to bed almost the entire shift. Ate only boost at lunch, refused all other food today. Did not participate in the milieu, nor was she social with peers. After speaking with provider, pt stated she does not want to be discharged \"Because I'm scared of the procedure as is, if I'm not here, I know I won't go. That's why I wanted to be hospitalized.\" Pt denies SI/SIB, states depression has improved since began ECT, and that she has anxiety only around procedure.     Assessment of pt's progress toward meeting careplan goals: no change.    "

## 2019-01-01 PROCEDURE — 12400001 ZZH R&B MH UMMC

## 2019-01-01 PROCEDURE — G0177 OPPS/PHP; TRAIN & EDUC SERV: HCPCS

## 2019-01-01 PROCEDURE — 25000132 ZZH RX MED GY IP 250 OP 250 PS 637: Performed by: PHYSICIAN ASSISTANT

## 2019-01-01 PROCEDURE — 25000132 ZZH RX MED GY IP 250 OP 250 PS 637: Performed by: PSYCHIATRY & NEUROLOGY

## 2019-01-01 PROCEDURE — 25000132 ZZH RX MED GY IP 250 OP 250 PS 637: Performed by: EMERGENCY MEDICINE

## 2019-01-01 RX ADMIN — BUPROPION HYDROCHLORIDE 100 MG: 100 TABLET, FILM COATED ORAL at 14:34

## 2019-01-01 RX ADMIN — Medication 500 MG: at 14:34

## 2019-01-01 RX ADMIN — PAROXETINE HYDROCHLORIDE 40 MG: 10 SUSPENSION ORAL at 14:34

## 2019-01-01 RX ADMIN — LEVOTHYROXINE SODIUM 25 MCG: 25 TABLET ORAL at 08:57

## 2019-01-01 RX ADMIN — CLONAZEPAM 0.5 MG: 0.5 TABLET ORAL at 14:34

## 2019-01-01 RX ADMIN — MELATONIN TAB 3 MG 3 MG: 3 TAB at 22:42

## 2019-01-01 ASSESSMENT — ACTIVITIES OF DAILY LIVING (ADL)
HYGIENE/GROOMING: PROMPTS
DRESS: INDEPENDENT
LAUNDRY: WITH SUPERVISION
ORAL_HYGIENE: PROMPTS

## 2019-01-01 ASSESSMENT — MIFFLIN-ST. JEOR: SCORE: 975.1

## 2019-01-01 NOTE — PROVIDER NOTIFICATION
"Patient is refusing to eat food or drink Boost this shift, despite encouragement and education about nutritions effect on body and mind. She will only drink water. She states she has nausea and \"doesn't feel good.\" When asked for specifics, she is not clear. It is reported that she has been drinking Boost in prior days, so I encouraged this tonight, but she refuses and keeps saying \"maybe later.\" She is quite anorexic and cachectic looking. She is isolative to room and lies in bed awake, sometimes napping. Her affect is flat & anxious.  She rates depression at 6 and anxiety at 4.     We did discuss the fact that she missed her ECT treatment today due to not feeling well. I inquired whether she has any questions or fears about ECT. She states she is \"nervous\" about it, so I took the opportunity to explain the ECT procedure and answered her questions. I reassured her that she is in safe hands during the procedure and there are many staff present to monitor her.     I encouraged her numerous times to at least drink Boost throughout this shift, but she refused. I almost wonder if she is trying to feel ill so that she doesn't have to get ECT (?). This should be assessed. She should be encouraged to take the Zofran that is ordered for nausea.       12/31/18 2100   Behavioral Health   Hallucinations denies / not responding to hallucinations   Thinking intact  (focused on \"feeling ill\", not wanting to eat. )   Orientation person: oriented;place: oriented;date: oriented   Memory baseline memory   Insight poor   Judgement impaired  (Will not eat even tho given reasons why she needs nourishmen)   Eye Contact at examiner   Affect/Mood (WDL) ex   Affect blunted, flat   Mood depressed;anxious   ADL Assessment (WDL) ex   Physical Appearance/Attire disheveled   Hygiene neglected grooming - unclean body, hair, teeth   Suicidality (WDL) WDL   Suicidality (denies)   1. Wish to be Dead No   Wish to be Dead Description denies   2. " Non-Specific Active Suicidal Thoughts  No   Non-Specific Active Suicidal Thought Description denies   3. Active Sucidal Ideation with any Methods (Not Plan) Without Intent to Act  No   Active Suicidal Ideation with any Methods (Not Plan) Description  denies   4. Active Suicidal Ideation with Some Intent to Act, Without Specific Plan  No

## 2019-01-01 NOTE — PROGRESS NOTES
"Pt declined breakfast and would not drink a boost either. Pt     01/01/19 4854   Behavioral Health   Hallucinations denies / not responding to hallucinations   Thinking intact   Orientation person: oriented;place: oriented;date: oriented;time: oriented   Memory baseline memory   Insight poor   Judgement impaired   Eye Contact at examiner   Affect (neutral)   Mood anxious;depressed   Physical Appearance/Attire appears stated age;untidy   Hygiene neglected grooming - unclean body, hair, teeth   Suicidality (denies)   1. Wish to be Dead No   2. Non-Specific Active Suicidal Thoughts  No   Self Injury (denies)   Elopement (WDL) WDL   Activity isolative;withdrawn   Speech coherent;clear   Medication Sensitivity no stated side effects;no observed side effects   Psychomotor / Gait steady;balanced   Coping/Psychosocial   Verbalized Emotional State anxiety   Safety   Suicidality Status 15   Activities of Daily Living   Hygiene/Grooming prompts   Oral Hygiene prompts   Dress independent   Laundry with supervision   Room Organization independent    has been in her room most of the day but did come out to the lounge to eat lunch; pt said earlier that she didn't have an appetite because she hasn't \"been feeling well\". Pt was encouraged to take a shower but she said, \"I don't think so\". Pt is \"afraid\" of ECT tomorrow; \"I get that weakness, you know\". Pt rated her depression a \"3-4\" and her anxiety a \"5-6\". Pt denies SI and SIB thoughts.   "

## 2019-01-02 ENCOUNTER — ANESTHESIA EVENT (OUTPATIENT)
Dept: BEHAVIORAL HEALTH | Facility: CLINIC | Age: 54
End: 2019-01-02

## 2019-01-02 ENCOUNTER — ANESTHESIA (OUTPATIENT)
Dept: BEHAVIORAL HEALTH | Facility: CLINIC | Age: 54
End: 2019-01-02

## 2019-01-02 PROCEDURE — 25000128 H RX IP 250 OP 636: Performed by: ANESTHESIOLOGY

## 2019-01-02 PROCEDURE — 25000125 ZZHC RX 250: Performed by: ANESTHESIOLOGY

## 2019-01-02 PROCEDURE — 25000132 ZZH RX MED GY IP 250 OP 250 PS 637: Performed by: PSYCHIATRY & NEUROLOGY

## 2019-01-02 PROCEDURE — 90870 ELECTROCONVULSIVE THERAPY: CPT

## 2019-01-02 PROCEDURE — 25000132 ZZH RX MED GY IP 250 OP 250 PS 637: Performed by: EMERGENCY MEDICINE

## 2019-01-02 PROCEDURE — 25000125 ZZHC RX 250: Performed by: PSYCHIATRY & NEUROLOGY

## 2019-01-02 PROCEDURE — 12400001 ZZH R&B MH UMMC

## 2019-01-02 PROCEDURE — 25000128 H RX IP 250 OP 636: Performed by: PSYCHIATRY & NEUROLOGY

## 2019-01-02 PROCEDURE — 25000132 ZZH RX MED GY IP 250 OP 250 PS 637: Performed by: PHYSICIAN ASSISTANT

## 2019-01-02 RX ORDER — CAFFEINE AND SODIUM BENZOATE 125 MG/ML
500 INJECTION, SOLUTION INTRAMUSCULAR; INTRAVENOUS
Status: COMPLETED | OUTPATIENT
Start: 2019-01-02 | End: 2019-01-02

## 2019-01-02 RX ORDER — LIDOCAINE HYDROCHLORIDE 20 MG/ML
40 INJECTION, SOLUTION EPIDURAL; INFILTRATION; INTRACAUDAL; PERINEURAL
Status: COMPLETED | OUTPATIENT
Start: 2019-01-02 | End: 2019-01-02

## 2019-01-02 RX ADMIN — MELATONIN TAB 3 MG 3 MG: 3 TAB at 23:06

## 2019-01-02 RX ADMIN — CAFFEINE AND SODIUM BENZOATE 500 MG: 125 INJECTION, SOLUTION INTRAMUSCULAR; INTRAVENOUS at 10:54

## 2019-01-02 RX ADMIN — ACETAMINOPHEN 650 MG: 325 TABLET, FILM COATED ORAL at 15:10

## 2019-01-02 RX ADMIN — METHOHEXITAL SODIUM 60 MG: 500 INJECTION, POWDER, LYOPHILIZED, FOR SOLUTION INTRAMUSCULAR; INTRAVENOUS; RECTAL at 10:53

## 2019-01-02 RX ADMIN — LIDOCAINE HYDROCHLORIDE 40 MG: 20 INJECTION, SOLUTION EPIDURAL; INFILTRATION; INTRACAUDAL; PERINEURAL at 10:54

## 2019-01-02 RX ADMIN — PAROXETINE HYDROCHLORIDE 40 MG: 10 SUSPENSION ORAL at 13:54

## 2019-01-02 RX ADMIN — Medication 500 MG: at 13:54

## 2019-01-02 RX ADMIN — ACETAMINOPHEN 650 MG: 325 TABLET, FILM COATED ORAL at 21:42

## 2019-01-02 RX ADMIN — BUPROPION HYDROCHLORIDE 100 MG: 100 TABLET, FILM COATED ORAL at 13:54

## 2019-01-02 RX ADMIN — LEVOTHYROXINE SODIUM 25 MCG: 25 TABLET ORAL at 12:09

## 2019-01-02 RX ADMIN — CLONAZEPAM 0.5 MG: 0.5 TABLET ORAL at 13:54

## 2019-01-02 RX ADMIN — SODIUM CHLORIDE, POTASSIUM CHLORIDE, SODIUM LACTATE AND CALCIUM CHLORIDE 1000 ML: 600; 310; 30; 20 INJECTION, SOLUTION INTRAVENOUS at 10:52

## 2019-01-02 RX ADMIN — Medication 60 MG: at 10:53

## 2019-01-02 ASSESSMENT — LIFESTYLE VARIABLES: TOBACCO_USE: 1

## 2019-01-02 ASSESSMENT — ACTIVITIES OF DAILY LIVING (ADL)
HYGIENE/GROOMING: WITH ASSISTANCE
LAUNDRY: WITH SUPERVISION
DRESS: INDEPENDENT
HYGIENE/GROOMING: INDEPENDENT
DRESS: PROMPTS
ORAL_HYGIENE: PROMPTS;INDEPENDENT
LAUNDRY: UNABLE TO COMPLETE
ORAL_HYGIENE: INDEPENDENT

## 2019-01-02 NOTE — PROCEDURES
Procedure/Surgery Information   [facility], [place]    Bedside Procedure Note  Date of Service (when I performed the procedure): 01/02/2019    Gwen Cash is a 53 year old female patient.  1. Major depressive disorder, remission status unspecified, unspecified whether recurrent    2. Severe recurrent major depression without psychotic features (H)      Past Medical History:   Diagnosis Date     Calculus of right kidney 1997    had hydronephrosis & treated for mild corinna at that time, calcium phosphate stone s/p lithotripsy      Chronic depressive personality disorder      DUB (dysfunctional uterine bleeding) 2004    work up including tsh ,labs, ecc , gyn eval normal      H/O echocardiogram 11/2011    at [facility] was normal , no valvular issues, done for functional benign murmur noted in past      History of Papanicolaou smear of cervix 9/14/01, 6/21/04    NIL     OCD (obsessive compulsive disorder) 3/4/2017     Panic disorder without agoraphobia 3/4/2017     Tobacco use disorder 3/4/2017     Temp: 97.7  F (36.5  C) Temp src: Tympanic BP: 119/82 Pulse: 92   Resp: 16 SpO2: 97 %        Procedures     Melo Wang     [facility], [place]   ECT Procedure Note   01/02/2019    Gwen Cash [id number]   53 year old 1965     Patient Status: Inpatient    Is this the first in a series of 12 treatments?  No    History and Physical: Reviewed in medical record    Consent: Informed consent was signed by: patient    Date Consent Signed: 12/7/18      Allergies   Allergen Reactions     Metronidazole Unknown     Abstracted data , patient cannot remember      No Clinical Screening - See Comments Hives     Penicillins Unknown     Abstracted data , patient cannot remember      Phenylbutazones        Weight:  93 lbs 0 oz              Indications for ECT:   Medications ineffective         Clinical Narrative:   Patient was admitted with severe depression and OCD.    She's continuing ECT for depression.  NPO after 2400.  Alert and Oriented x 3.   Mood is doing better. She had some woosiness with last ECT and in general.  Pt and sister ask for a few labs, which I ordered.            Diagnosis:   Major depression         Pause for the Cause:     Right patient Yes   Right procedure/laterality settings: Yes          Intra-Procedure Documentation:       ECT #: 8   Treatment number this series: 8   Total treatment number: 8     Type of ECT:  Right, unilateral ultrabrief     ECT Medications:   Today:  Lactated Ringers:  1 liter   Lidocaine:  40mg   Brevital: 60mg  Caffeine: 500mg (after she's asleep)  Succinyl Choline: 60mg    ECT Strip Summary:   Energy Level: 55 percent  Motor Seizure Duration:  34 seconds  EEG Seizure Duration:   64 seconds    Complications: No    Plan:   Next ECT 1/4/19    Melo Wang MD

## 2019-01-02 NOTE — PROGRESS NOTES
Pt continues to express depression, rating her depression at a 4 out of 10 with 10 being the worse. She denies anxiety, pain, racing thoughts, SI, SIB, and HI (auditory/visual). She was tearful before ECT but brightened after ECT. She ate a burger for lunch and continued drinking water. She is hopeful of leaving after ECT. No other concerns from pt.       01/02/19 1319   Behavioral Health   Hallucinations denies / not responding to hallucinations   Thinking intact   Orientation person: oriented;place: oriented;date: oriented;time: oriented   Memory baseline memory   Insight insight appropriate to situation;insight appropriate to events   Judgement impaired   Eye Contact at examiner   Affect blunted, flat   Mood mood is calm   Hygiene neglected grooming - unclean body, hair, teeth;body odor   Suicidality other (see comments)  (denies)   1. Wish to be Dead No   2. Non-Specific Active Suicidal Thoughts  No   Self Injury other (see comment)  (denies)   Elopement (none stated/observed)   Activity other (see comment)  (visible in milieu, in and out of room)   Speech coherent;clear   Psychomotor / Gait balanced;steady   Activities of Daily Living   Hygiene/Grooming independent   Oral Hygiene independent   Dress independent   Laundry with supervision   Room Organization independent

## 2019-01-02 NOTE — PROGRESS NOTES
[facility], [place]   Psychiatric Progress Note      Impression:   Gwen Cash is a 53 year old female admitted for severe depression and weight loss of 30 lbs. We started ECT on 12/7 to improve severe anxiety, OCD.  We are adjusting medications to target mood.  We are also working with the patient on therapeutic skill building.             Diagnoses and Plan:       Principal Diagnosis: MDD, recurrent, severe without psychotic features. OCD  Unit: station 10  Attending: Santana  Medications: risks/benefits discussed with patient-    - Paxil  40mg daily for compulsive behaviors, anxiety, depression  - Wellbutrin 100mg AM   -klonopin 0.5mg HS   -synthroid 25 mcg daily  - N-Acetyl cysteine 500mg daily  Laboratory/Imaging:  - Upreg neg, CBC wnl and BMP WNL  Consults:  -  Internal medicine, to evaluate synthroid dose.  Patient will be treated in therapeutic milieu with appropriate individual and group therapies as described.     ECT-patient refused ECT today stating that she is feeling sick.   Medical diagnoses to be addressed this admission:   Weight loss of 30 lbs  Hypothyroidism      Legal Status: Voluntary      Safety Assessment:   Checks: Status 15  Precautions: Suicide  Pt has not required locked seclusion or restraints in the past 24 hours to maintain safety, please refer to RN documentation for further details.    The risks, benefits, alternatives and side effects have been discussed and are understood by the patient and other caregivers.     Target symptoms to stabilize: SI, depressed, neurovegetative symptoms, sleep issues and disordered eating  Target disposition: outpatient provider       Attestation:  Patient has been seen and evaluated by me,  Joe Dillon MD          Interim History:   The patient's care was discussed with the treatment team and chart notes were reviewed.    Today patient refused to go to ECT, stating that she is feeling sick.   This is the 2nd time that  "she refused ECT for being sick.   Writer had a discussion with the patient today about potentially continuing ECT as outpatient. Writer said to the pt that because she does not have SI, ( never had),   And she has excellent family support ( every time for ECT family members come to the unit and accompany her to ECT), we could discharge patient to home to continue outpatient ECT, and her family can provide transportation for her.   The patient almost got upset with this suggestion, shook her head, and she said nobody can drive her to ECT. She lives with her , but he does not have a car, she says. Also she said multiple times that she will wait for Dr. Powell to talk about this.   She says that she feels her depression is a little better with ECT.     Writer has impression that her clinical picture is a mixture of depression, OCD, anxiety and dependent personality and it is difficult to expect how much of what can improve over time.    The 10 point Review of Systems is negative other than noted in the HPI         Medications:       acetylcysteine  500 mg Oral Daily     buPROPion  100 mg Oral Daily     clonazePAM  0.5 mg Oral Daily     influenza vaccine adult (product based on age)  0.5 mL Intramuscular Prior to discharge     levothyroxine  25 mcg Oral QAM AC     PARoxetine  40 mg Oral Daily             Allergies:     Allergies   Allergen Reactions     Metronidazole Unknown     Abstracted data , patient cannot remember      No Clinical Screening - See Comments Hives     Penicillins Unknown     Abstracted data , patient cannot remember      Phenylbutazones             Psychiatric Examination:   /82   Pulse 92   Temp 98.1  F (36.7  C) (Tympanic)   Resp 16   Ht 1.575 m (5' 2\")   Wt 42.2 kg (93 lb)   SpO2 98%   BMI 17.01 kg/m    Weight is 93 lbs 0 oz  Body mass index is 17.01 kg/m .  Appearance:  Hospital attire, appropriate groom  Attitude:  Cooperative, perseverative on her anxiety  Eye Contact:  " fair  Mood:  Extremely anxious  Affect:  restricted  Speech:  coherent  Psychomotor Behavior:  No evidence of tardive dyskinesia, no tic, no tremor  Thought Process:  Perseverative.   Associations:  No loose association  Thought Content:  Obsession with her own anxiety. Denies SI. No HI. No AH. No VH  Insight:  limited  Judgment: limited   Oriented to: time place person   Attention Span and Concentration: limited   Recent and Remote Memory:  fair  Language: able to name objects  Fund of Knowledge: normal  Muscle Strength and Tone: normal  Gait and Station: normal               Labs:   No results found for this or any previous visit (from the past 24 hour(s)).

## 2019-01-02 NOTE — PROGRESS NOTES
Returned from ECT, alert and oriented X3.  No complaints of pain, nausea or dizziness.  Taking fluids and morning medication, visiting with family.

## 2019-01-02 NOTE — PLAN OF CARE
48 Hour Nursing Assessment    Pt evaluation continues. Has been in the milieu most of shift. States she is making effort to be out of her room. Walked hallway for approximately 10 minutes. Ate 50% of supper. States she will have ECT treatment tomorrow, refused treatment 12/31/18.     Pt's mood has been anxious/depressed this shift, tearful at times. Ruminates about - not feeling hungry, losing weight, and feeling fatigued. States has sudden bouts of fatigue and believes it is from ECT treatments. Denies feeling dizzy or lightheaded. VS at 1925 T 98.1 tympanic, P 92, /82.    Encouraged pt to follow treatment plan. To attend and participate in group programming and to continue with ECT treatments.    Pt denies SI/SIB/AH. Continues on fall precautions due to ECT treatments.

## 2019-01-02 NOTE — PLAN OF CARE
BEHAVIORAL TEAM DISCUSSION    Participants: Joe Dillon MD, Janey FLORES and Constanza MCKEON RN  Progress: Pt continues to dictate treatment, refusing ECT on 12/31/18. Continue with inpatient ECT.   Continued Stay Criteria/Rationale: Inpatient ECT.   Medical/Physical: See medical consult, if applicable.   Precautions:   Behavioral Orders   Procedures    Code 1 - Restrict to Unit    Code 2     Only during ECT days (Mon, Wed, Fri)    Electroconvulsive therapy     ECT every Monday, Wednesday, and Friday    Electroconvulsive therapy     Series of up to 12 treatments. Begin Date: 12/7/18     Treating Psychiatrist providing ECT:  Kathleen     Notified on:  12/5/18    Fall precautions    Routine Programming     As clinically indicated    Status 15     Every 15 minutes.     Plan: Continue with inpatient ECT.   Rationale for change in precautions or plan: No change, continue with plan of care.

## 2019-01-02 NOTE — ANESTHESIA PREPROCEDURE EVALUATION
Anesthesia Pre-Procedure Evaluation    Patient: Gwen Cash   MRN:     [id number] Gender:   female   Age:    53 year old :      1965        Preoperative Diagnosis: * No pre-op diagnosis entered *   * No procedures listed *     Past Medical History:   Diagnosis Date     Calculus of right kidney     had hydronephrosis & treated for mild corinna at that time, calcium phosphate stone s/p lithotripsy      Chronic depressive personality disorder      DUB (dysfunctional uterine bleeding)     work up including tsh ,labs, ecc , gyn eval normal      H/O echocardiogram 2011    at [facility] was normal , no valvular issues, done for functional benign murmur noted in past      History of Papanicolaou smear of cervix 01, 04    NIL     OCD (obsessive compulsive disorder) 3/4/2017     Panic disorder without agoraphobia 3/4/2017     Tobacco use disorder 3/4/2017      Past Surgical History:   Procedure Laterality Date      SECTION      x 3     LITHOTRIPSY            Anesthesia Evaluation     . Pt has had prior anesthetic. Type: General and Regional    No history of anesthetic complications          ROS/MED HX    ENT/Pulmonary:     (+)tobacco use, , . .    Neurologic:  - neg neurologic ROS     Cardiovascular:  - neg cardiovascular ROS   (+) ----. : . . . :. . Previous cardiac testing date:results:date: results:ECG reviewed date: results:NSR. date: results:          METS/Exercise Tolerance:  >4 METS   Hematologic:  - neg hematologic  ROS       Musculoskeletal:  - neg musculoskeletal ROS       GI/Hepatic:  - neg GI/hepatic ROS       Renal/Genitourinary:     (+) Nephrolithiasis ,       Endo:  - neg endo ROS       Psychiatric: Comment: Severe anxiety and OCD.    (+) psychiatric history anxiety and other (comment)      Infectious Disease:  - neg infectious disease ROS       Malignancy:         Other:                         PHYSICAL EXAM:   Mental Status/Neuro: A/A/O   Airway: Facies:  "Feasible  Mallampati: I  Mouth/Opening: Full  TM distance: > 6 cm  Neck ROM: Full   Respiratory: Auscultation: CTAB     Resp. Rate: Normal     Resp. Effort: Normal      CV: Rhythm: Regular  Rate: Age appropriate  Heart: Normal Sounds   Comments:      Dental: Normal                  Lab Results   Component Value Date    WBC 7.8 11/16/2018    HGB 15.9 (H) 11/16/2018    HCT 46.3 11/16/2018     11/16/2018     11/29/2018    POTASSIUM 4.4 11/29/2018    CHLORIDE 102 11/29/2018    CO2 30 11/29/2018    BUN 20 11/29/2018    CR 0.74 11/29/2018    GLC 90 11/29/2018    JOSE 9.6 11/29/2018    PHOS 3.4 11/29/2018    MAG 2.2 11/29/2018    ALBUMIN 4.9 11/16/2018    PROTTOTAL 8.3 11/16/2018    ALT 17 11/16/2018    AST 11 11/16/2018    ALKPHOS 74 11/16/2018    BILITOTAL 0.4 11/16/2018    TSH 5.89 (H) 11/16/2018    T4 0.93 11/16/2018    HCG Negative 11/16/2018       Preop Vitals  BP Readings from Last 3 Encounters:   01/01/19 119/82   03/03/17 126/84    Pulse Readings from Last 3 Encounters:   01/01/19 92   03/03/17 99      Resp Readings from Last 3 Encounters:   01/02/19 16   03/03/17 16    SpO2 Readings from Last 3 Encounters:   01/02/19 97%   03/03/17 96%      Temp Readings from Last 1 Encounters:   01/02/19 36.5  C (97.7  F) (Tympanic)    Ht Readings from Last 1 Encounters:   11/16/18 1.575 m (5' 2\")      Wt Readings from Last 1 Encounters:   01/01/19 42.2 kg (93 lb)    Estimated body mass index is 17.01 kg/m  as calculated from the following:    Height as of 11/16/18: 1.575 m (5' 2\").    Weight as of 1/1/19: 42.2 kg (93 lb).     LDA:            Assessment:   ASA SCORE: 2       Documentation: H&P complete; Preop Testing complete; Consents complete   Proceeding: Proceed without further delay  Tobacco Use:  NO Active use of Tobacco/UNKNOWN Tobacco use status     Plan:   Anes. Type:  General      Induction:  IV (Standard)   Airway: Native Airway   Access/Monitoring: PIV   Maintenance: Balanced   Emergence: Procedure Site "   Logistics: Same Day Surgery     Postop Pain/Sedation Strategy:  Standard-Options: Opioids PRN     PONV Management:  Adult Risk Factors: Female, Non-Smoker, Postop Opioids     CONSENT: Direct conversation      Blood Products: Consent Deferred (Minimal Blood Loss)                             Con Qureshi MD

## 2019-01-02 NOTE — ANESTHESIA POSTPROCEDURE EVALUATION
Anesthesia POST Procedure Evaluation    Patient: Gwen Cash   MRN:     [id number] Gender:   female   Age:    53 year old :      1965        Preoperative Diagnosis: * No pre-op diagnosis entered *   * No procedures listed *   Postop Comments: No value filed.       Anesthesia Type:  General    Reportable Event: NO     PAIN: Uncomplicated   Sign Out status: Comfortable, Well controlled pain     PONV: No PONV   Sign Out status:  No Nausea or Vomiting     Neuro/Psych: Uneventful perioperative course   Sign Out Status: Preoperative baseline; Age appropriate mentation     Airway/Resp.: Uneventful perioperative course   Sign Out Status: Non labored breathing, age appropriate RR; Resp. Status within EXPECTED Parameters     CV: Uneventful perioperative course   Sign Out status: Appropriate BP and perfusion indices; Appropriate HR/Rhythm     Disposition:   Sign Out in:  PACU  Disposition:  Phase II; Home  Recovery Course: Uneventful  Follow-Up: Not required           Last Anesthesia Record Vitals:  CRNA VITALS  2019 1030 - 2019 1122      2019             Pulse:  107    SpO2:  99 %    Resp Rate (set):  8          Last PACU/Preop Vitals:  Vitals:    19 0843 19 1100 19 1115   BP:  (!) 164/94 (!) 154/112   Pulse:  104 118   Resp: 16 24 20   Temp: 36.5  C (97.7  F) 36.7  C (98  F)    SpO2: 97% 98% 95%         Electronically Signed By: Con Qureshi MD, 2019, 11:22 AM

## 2019-01-03 LAB
ANION GAP SERPL CALCULATED.3IONS-SCNC: 8 MMOL/L (ref 3–14)
BUN SERPL-MCNC: 20 MG/DL (ref 7–30)
CALCIUM SERPL-MCNC: 8.9 MG/DL (ref 8.5–10.1)
CHLORIDE SERPL-SCNC: 105 MMOL/L (ref 94–109)
CO2 SERPL-SCNC: 28 MMOL/L (ref 20–32)
CREAT SERPL-MCNC: 0.78 MG/DL (ref 0.52–1.04)
GFR SERPL CREATININE-BSD FRML MDRD: 85 ML/MIN/{1.73_M2}
GLUCOSE SERPL-MCNC: 82 MG/DL (ref 70–99)
HGB BLD-MCNC: 12.7 G/DL (ref 11.7–15.7)
POTASSIUM SERPL-SCNC: 4.5 MMOL/L (ref 3.4–5.3)
SODIUM SERPL-SCNC: 141 MMOL/L (ref 133–144)
TSH SERPL DL<=0.005 MIU/L-ACNC: 0.53 MU/L (ref 0.4–4)

## 2019-01-03 PROCEDURE — 12400001 ZZH R&B MH UMMC

## 2019-01-03 PROCEDURE — 25000132 ZZH RX MED GY IP 250 OP 250 PS 637: Performed by: PSYCHIATRY & NEUROLOGY

## 2019-01-03 PROCEDURE — 36415 COLL VENOUS BLD VENIPUNCTURE: CPT | Performed by: PSYCHIATRY & NEUROLOGY

## 2019-01-03 PROCEDURE — 80048 BASIC METABOLIC PNL TOTAL CA: CPT | Performed by: PSYCHIATRY & NEUROLOGY

## 2019-01-03 PROCEDURE — 25000132 ZZH RX MED GY IP 250 OP 250 PS 637: Performed by: PHYSICIAN ASSISTANT

## 2019-01-03 PROCEDURE — 85018 HEMOGLOBIN: CPT | Performed by: PSYCHIATRY & NEUROLOGY

## 2019-01-03 PROCEDURE — 25000132 ZZH RX MED GY IP 250 OP 250 PS 637: Performed by: EMERGENCY MEDICINE

## 2019-01-03 PROCEDURE — 84443 ASSAY THYROID STIM HORMONE: CPT | Performed by: PSYCHIATRY & NEUROLOGY

## 2019-01-03 RX ADMIN — LEVOTHYROXINE SODIUM 25 MCG: 25 TABLET ORAL at 09:27

## 2019-01-03 RX ADMIN — Medication 500 MG: at 14:02

## 2019-01-03 RX ADMIN — PAROXETINE HYDROCHLORIDE 40 MG: 10 SUSPENSION ORAL at 14:02

## 2019-01-03 RX ADMIN — CLONAZEPAM 0.5 MG: 0.5 TABLET ORAL at 14:02

## 2019-01-03 RX ADMIN — BUPROPION HYDROCHLORIDE 100 MG: 100 TABLET, FILM COATED ORAL at 14:02

## 2019-01-03 RX ADMIN — MELATONIN TAB 3 MG 3 MG: 3 TAB at 22:31

## 2019-01-03 ASSESSMENT — ACTIVITIES OF DAILY LIVING (ADL)
DRESS: PROMPTS
LAUNDRY: WITH SUPERVISION
HYGIENE/GROOMING: PROMPTS
HYGIENE/GROOMING: INDEPENDENT;PROMPTS
ORAL_HYGIENE: PROMPTS;INDEPENDENT
LAUNDRY: UNABLE TO COMPLETE
ORAL_HYGIENE: PROMPTS
DRESS: PROMPTS;INDEPENDENT

## 2019-01-03 NOTE — PROGRESS NOTES
"Pt was in her bed most part of the day; attributing to fatigue. Pt stated she was tired from sleeping late last night. Pt continues to have flat affect, and states she gas \"some\" depression (4/10) and no anxiety. Pt denies SI/SiB/AH/VH. Pt was encouraged to be present in the mileu and go to group which she is currently unsure about.        01/03/19 1112   Behavioral Health   Hallucinations denies / not responding to hallucinations   Thinking intact   Orientation person: oriented;place: oriented;date: oriented;time: oriented   Memory baseline memory   Insight poor;admits / accepts   Judgement impaired   Eye Contact at examiner   Affect blunted, flat   Mood depressed   Physical Appearance/Attire appears stated age   Hygiene neglected grooming - unclean body, hair, teeth   Suicidality other (see comments)  (Pt denies)   1. Wish to be Dead No   Wish to be Dead Description denies   2. Non-Specific Active Suicidal Thoughts  No   Non-Specific Active Suicidal Thought Description denies   Self Injury other (see comment)  (Pt denies)   Elopement (no concerning statements or behaviors)   Activity isolative;withdrawn   Speech circumstantial   Medication Sensitivity no stated side effects   Psychomotor / Gait balanced;steady   Activities of Daily Living   Hygiene/Grooming prompts   Oral Hygiene prompts   Dress prompts   Laundry unable to complete   Room Organization prompts     "

## 2019-01-03 NOTE — PLAN OF CARE
"  RN ASSESSMENT  Decreased Participation/Engagement (Depressive Signs/Symptoms) (Adult)  Increased Participation/Engagement (Depressive Signs/Symptoms)  1/2/2019 2302 - No Change  Energy/Vigor Impairment (Depressive Signs/Symptoms) (Adult)  Improved Energy/Vigor (Depressive Signs/Symptoms)  1/2/2019 2302 - No Change   Cognitive Impairment (Obsessive-Compulsive Signs/Symptoms) (Adult)  Improved Focus and Information Retention (Obsessive-Compulsive Signs/Symptoms)  1/2/2019 2302 - No Change     Pt visible in milieu. Enjoyed interacting with therapy dog. Social with peers. Brightens during conversation at times and laughs at humorous situations at times. Continues to perseverate and \"worry\". Concerned about mildly elevated temperature this evening and the ongoing sensation of feeling \"suddenly weak.\" Describes a feeling that comes over her of feeling woozy. Denies feeling like she may fall. \"It's difficult to describe. Suddenly my head, neck, shoulders, arms feel weak.\" Denies depressed mood. Anxiety about \"getting better, getting through my treatments.\" Denies SI/SIB. Neglected grooming. Poor appetite. Poor sleep. Tylenol 650 mg po PRN for temp of 100.1 which later was 99.6. See flowsheet. Melatonin 3 mg po PRN. Continue with current treatment plan and recommendations. Continue to monitor and reassess symptoms. Monitor response to medications. Monitor progress towards treatment goals. Encourage groups and participation.      "

## 2019-01-03 NOTE — PROGRESS NOTES
CLINICAL NUTRITION SERVICES - REASSESSMENT NOTE     Nutrition Prescription    RECOMMENDATIONS FOR MDs/PROVIDERS TO ORDER:  1. Strongly recommend trialing appetite stimulant (ie Megace, Miranol, Remeron) given continuation of poor appetite for prolonged period of time, significant wt loss over past year, and difficulty putting on wt this admission despite intakes.   2. Please continue to encourage po intakes and goal of 2 Boost/day.    Malnutrition Status:    Non-severe malnutrition in the context of environmental or social circumstances     Recommendations already ordered by Registered Dietitian (RD):  - Continue Boost BID with meals  - Continue Magic Cup and Gelatein with lunch and dinner    Future/Additional Recommendations:  1. Monitor po intakes and wt trends. Consider need to add/discontinue supplement or adjust flavors pending trends and pt preferences.    2. Continue to encourage 2 Boost/day     EVALUATION OF THE PROGRESS TOWARD GOALS   Diet: Regular  Snacks/supplements: Magic Cup (moose) with lunch, Gelatein (cherry) with dinner; Boost Plus TID (strawberry with breakfast & dinner, moose with lunch)  Intake: Appetite remains poor-fair. Skips breakfast at baseline. Eating typically % of lunch and dinner. However, over the past few days pt c/o not feeling well and refused lunch, dinner, and Boost a few times.     NEW FINDINGS   - Wt: Pt has been trying to gain wt over admission. She is down 2 lb over the past ~9 days and down 2 lb since admit wt. Underweight BMI. Pt had reported UBW of 125 lb. Per previous assessment, She states that in Jan/Feb, she went to the doctor and was 119 lbs. No recent wt history available in CareEverywhere, making it difficult to quantify wt loss. Per pt's report, wt loss of at least 26 lb (22%) over the past ~1 year. She is concerned about her wt loss and inability to gain wt.  - Continues ECT    MALNUTRITION   % Intake: Decreased intake does not meet criteria  % Weight Loss: >  20% in 1 year (severe)  Subcutaneous Fat Loss: Facial region: Mild-moderate  Muscle Loss: Temporal, Facial & jaw region, Scapular bone and Thoracic region (clavicle, acromium bone, deltoid, trapezius, pectoral): Mild-Moderate  Fluid Accumulation/Edema: None noted  Malnutrition Diagnosis: Non-severe malnutrition in the context of environmental or social circumstances    Previous Goals   1. Patient to consume % of nutritionally adequate meal trays TID, or the equivalent with supplements/snacks.  Evaluation: Not met  2. BMI to progress towards Normal BMI status (>18.5 kg/m2).  Evaluation: Not met  3. Patient to drink 3 Boost/day.  Evaluation: Not met    Previous Nutrition Diagnosis  Inadequate oral intake related to poor appetite, decreasing desire to eat as evidenced by variable intakes at times and overall reduced intakes over the past several months, and wt loss over the past ~11 months.    Evaluation: No change    CURRENT NUTRITION DIAGNOSIS  Inadequate oral intake related to poor appetite, decreasing desire to eat as evidenced by variable intakes at times and overall reduced intakes over the past several months, and wt loss over the past ~1 year.      INTERVENTIONS  Implementation  - Continue Boost BID with meals  - Continue Magic Cup and Gelatein with lunch and dinner  - Encouraged meals TID + snacks on unit. Encouraged pt to try to eat breakfast or drink Boost for breakfast a couple times this next week. Encouraged pt to discuss appetite stimulant with provider.    Goals  1. Patient to consume % of nutritionally adequate meal trays TID, or the equivalent with supplements/snacks.  2. BMI to progress towards Normal BMI status (>18.5 kg/m2).  3. Patient to drink at least 2 Boost/day.    Monitoring/Evaluation  Progress toward goals will be monitored and evaluated per protocol.    Stephanie Trejo RD, LD  Unit pgr: [phone]

## 2019-01-04 ENCOUNTER — ANESTHESIA EVENT (OUTPATIENT)
Dept: BEHAVIORAL HEALTH | Facility: CLINIC | Age: 54
End: 2019-01-04

## 2019-01-04 ENCOUNTER — ANESTHESIA (OUTPATIENT)
Dept: BEHAVIORAL HEALTH | Facility: CLINIC | Age: 54
End: 2019-01-04

## 2019-01-04 PROCEDURE — 25000132 ZZH RX MED GY IP 250 OP 250 PS 637: Performed by: PSYCHIATRY & NEUROLOGY

## 2019-01-04 PROCEDURE — 25000128 H RX IP 250 OP 636: Performed by: ANESTHESIOLOGY

## 2019-01-04 PROCEDURE — 25000125 ZZHC RX 250: Performed by: PSYCHIATRY & NEUROLOGY

## 2019-01-04 PROCEDURE — 90870 ELECTROCONVULSIVE THERAPY: CPT

## 2019-01-04 PROCEDURE — 25000132 ZZH RX MED GY IP 250 OP 250 PS 637: Performed by: EMERGENCY MEDICINE

## 2019-01-04 PROCEDURE — 25000128 H RX IP 250 OP 636: Performed by: PSYCHIATRY & NEUROLOGY

## 2019-01-04 PROCEDURE — 12400001 ZZH R&B MH UMMC

## 2019-01-04 PROCEDURE — 25000132 ZZH RX MED GY IP 250 OP 250 PS 637: Performed by: PHYSICIAN ASSISTANT

## 2019-01-04 PROCEDURE — 25000125 ZZHC RX 250: Performed by: ANESTHESIOLOGY

## 2019-01-04 RX ORDER — LIDOCAINE HYDROCHLORIDE 20 MG/ML
40 INJECTION, SOLUTION EPIDURAL; INFILTRATION; INTRACAUDAL; PERINEURAL
Status: COMPLETED | OUTPATIENT
Start: 2019-01-04 | End: 2019-01-04

## 2019-01-04 RX ORDER — CAFFEINE AND SODIUM BENZOATE 125 MG/ML
500 INJECTION, SOLUTION INTRAMUSCULAR; INTRAVENOUS
Status: COMPLETED | OUTPATIENT
Start: 2019-01-04 | End: 2019-01-04

## 2019-01-04 RX ORDER — CAFFEINE AND SODIUM BENZOATE 125 MG/ML
500 INJECTION, SOLUTION INTRAMUSCULAR; INTRAVENOUS
Status: CANCELLED
Start: 2019-01-04 | End: 2019-01-04

## 2019-01-04 RX ADMIN — PAROXETINE HYDROCHLORIDE 40 MG: 10 SUSPENSION ORAL at 14:38

## 2019-01-04 RX ADMIN — Medication 60 MG: at 10:58

## 2019-01-04 RX ADMIN — METHOHEXITAL SODIUM 60 MG: 500 INJECTION, POWDER, LYOPHILIZED, FOR SOLUTION INTRAMUSCULAR; INTRAVENOUS; RECTAL at 10:58

## 2019-01-04 RX ADMIN — BUPROPION HYDROCHLORIDE 100 MG: 100 TABLET, FILM COATED ORAL at 14:38

## 2019-01-04 RX ADMIN — LIDOCAINE HYDROCHLORIDE 40 MG: 20 INJECTION, SOLUTION EPIDURAL; INFILTRATION; INTRACAUDAL; PERINEURAL at 10:56

## 2019-01-04 RX ADMIN — SODIUM CHLORIDE, POTASSIUM CHLORIDE, SODIUM LACTATE AND CALCIUM CHLORIDE 1000 ML: 600; 310; 30; 20 INJECTION, SOLUTION INTRAVENOUS at 11:01

## 2019-01-04 RX ADMIN — Medication 500 MG: at 14:38

## 2019-01-04 RX ADMIN — LEVOTHYROXINE SODIUM 25 MCG: 25 TABLET ORAL at 12:00

## 2019-01-04 RX ADMIN — CAFFEINE AND SODIUM BENZOATE 500 MG: 125 INJECTION, SOLUTION INTRAMUSCULAR; INTRAVENOUS at 10:57

## 2019-01-04 RX ADMIN — CLONAZEPAM 0.5 MG: 0.5 TABLET ORAL at 14:38

## 2019-01-04 RX ADMIN — MELATONIN TAB 3 MG 3 MG: 3 TAB at 22:29

## 2019-01-04 ASSESSMENT — ACTIVITIES OF DAILY LIVING (ADL)
ORAL_HYGIENE: PROMPTS
HYGIENE/GROOMING: PROMPTS
HYGIENE/GROOMING: PROMPTS
DRESS: PROMPTS
ORAL_HYGIENE: PROMPTS
DRESS: STREET CLOTHES;PROMPTS

## 2019-01-04 ASSESSMENT — LIFESTYLE VARIABLES: TOBACCO_USE: 1

## 2019-01-04 NOTE — ANESTHESIA PREPROCEDURE EVALUATION
Anesthesia Pre-Procedure Evaluation    Patient: Gwen Cash   MRN:     [id number] Gender:   female   Age:    53 year old :      1965        Preoperative Diagnosis: * No pre-op diagnosis entered *   * No procedures listed *     Past Medical History:   Diagnosis Date     Calculus of right kidney     had hydronephrosis & treated for mild corinna at that time, calcium phosphate stone s/p lithotripsy      Chronic depressive personality disorder      DUB (dysfunctional uterine bleeding)     work up including tsh ,labs, ecc , gyn eval normal      H/O echocardiogram 2011    at [facility] was normal , no valvular issues, done for functional benign murmur noted in past      History of Papanicolaou smear of cervix 01, 04    NIL     OCD (obsessive compulsive disorder) 3/4/2017     Panic disorder without agoraphobia 3/4/2017     Tobacco use disorder 3/4/2017      Past Surgical History:   Procedure Laterality Date      SECTION      x 3     LITHOTRIPSY            Anesthesia Evaluation     . Pt has had prior anesthetic. Type: General and Regional    No history of anesthetic complications          ROS/MED HX    ENT/Pulmonary:     (+)tobacco use, , . .    Neurologic:  - neg neurologic ROS     Cardiovascular:  - neg cardiovascular ROS   (+) ----. : . . . :. . Previous cardiac testing date:results:date: results:ECG reviewed date: results:NSR. date: results:          METS/Exercise Tolerance:  >4 METS   Hematologic:  - neg hematologic  ROS       Musculoskeletal:  - neg musculoskeletal ROS       GI/Hepatic:  - neg GI/hepatic ROS       Renal/Genitourinary:     (+) Nephrolithiasis ,       Endo:  - neg endo ROS       Psychiatric: Comment: Severe anxiety and OCD.    (+) psychiatric history anxiety and other (comment)      Infectious Disease:  - neg infectious disease ROS       Malignancy:         Other:                         PHYSICAL EXAM:   Mental Status/Neuro: A/A/O   Airway: Facies:  "Feasible  Mallampati: I  Mouth/Opening: Full  TM distance: > 6 cm  Neck ROM: Full   Respiratory: Auscultation: CTAB     Resp. Rate: Normal     Resp. Effort: Normal      CV: Rhythm: Regular  Rate: Age appropriate  Heart: Normal Sounds   Comments:      Dental: Normal                  Lab Results   Component Value Date    WBC 7.8 11/16/2018    HGB 12.7 01/03/2019    HCT 46.3 11/16/2018     11/16/2018     01/03/2019    POTASSIUM 4.5 01/03/2019    CHLORIDE 105 01/03/2019    CO2 28 01/03/2019    BUN 20 01/03/2019    CR 0.78 01/03/2019    GLC 82 01/03/2019    JOSE 8.9 01/03/2019    PHOS 3.4 11/29/2018    MAG 2.2 11/29/2018    ALBUMIN 4.9 11/16/2018    PROTTOTAL 8.3 11/16/2018    ALT 17 11/16/2018    AST 11 11/16/2018    ALKPHOS 74 11/16/2018    BILITOTAL 0.4 11/16/2018    TSH 0.53 01/03/2019    T4 0.93 11/16/2018    HCG Negative 11/16/2018       Preop Vitals  BP Readings from Last 3 Encounters:   01/04/19 150/72   03/03/17 126/84    Pulse Readings from Last 3 Encounters:   01/04/19 91   03/03/17 99      Resp Readings from Last 3 Encounters:   01/04/19 16   03/03/17 16    SpO2 Readings from Last 3 Encounters:   01/04/19 98%   03/03/17 96%      Temp Readings from Last 1 Encounters:   01/04/19 36.7  C (98  F) (Tympanic)    Ht Readings from Last 1 Encounters:   11/16/18 1.575 m (5' 2\")      Wt Readings from Last 1 Encounters:   01/01/19 42.2 kg (93 lb)    Estimated body mass index is 17.01 kg/m  as calculated from the following:    Height as of 11/16/18: 1.575 m (5' 2\").    Weight as of 1/1/19: 42.2 kg (93 lb).     LDA:  Peripheral IV 01/02/19 Left Hand (Active)   Site Assessment WDL 1/2/2019 11:45 AM   Line Status Infusing 1/2/2019 11:45 AM   Phlebitis Scale 0-->no symptoms 1/2/2019 11:45 AM   Number of days: 2       Peripheral IV 01/04/19 Right Upper arm (Active)   Site Assessment WDL 1/4/2019 11:34 AM   Line Status Infusing 1/4/2019 11:34 AM   Phlebitis Scale 0-->no symptoms 1/4/2019 11:34 AM   Infiltration " Scale 0 1/4/2019 11:34 AM   Number of days: 0            Assessment:   ASA SCORE: 2       Documentation: H&P complete; Preop Testing complete; Consents complete   Proceeding: Proceed without further delay  Tobacco Use:  NO Active use of Tobacco/UNKNOWN Tobacco use status     Plan:   Anes. Type:  General      Induction:  IV (Standard)   Airway: Native Airway   Access/Monitoring: PIV   Maintenance: Balanced   Emergence: Procedure Site   Logistics: Same Day Surgery     Postop Pain/Sedation Strategy:  Standard-Options: Opioids PRN     PONV Management:  Adult Risk Factors: Female, Non-Smoker, Postop Opioids     CONSENT: Direct conversation      Blood Products: Consent Deferred (Minimal Blood Loss)                             Mayra Hughes MD

## 2019-01-04 NOTE — PROGRESS NOTES
"Pt was visible in milieu, but remained withdraw from peers throughout evening shift. Upon check in pt stated that she was anxious about ECT tomorrow morning, and that this will be session 10 out of the 12 that are planned. Pt rates depression 4/10 and anxiety 9/10. Pt attributes her high level of anxiety to ECT tomorrow morning. Pt denies psychotic symptoms. Pt reports poor concentration. Pt reports the continuation of poor appetite. Pt reports that her sleep is \"good.\" Pt continues to engage in obsessive compulsive behaviors such as repeatedly straightening things in her room. Pt denies SI and SIB. Pt was calm and cooperative w/staff. Pt had an uneventful shift.      01/03/19 2200   Behavioral Health   Hallucinations denies / not responding to hallucinations   Thinking poor concentration   Orientation person: oriented;place: oriented;date: oriented;time: oriented   Memory baseline memory   Insight poor   Judgement impaired   Eye Contact at examiner   Affect blunted, flat   Mood mood is calm;anxious   Physical Appearance/Attire disheveled   Hygiene neglected grooming - unclean body, hair, teeth;body odor   Suicidality (Denies SI)   1. Wish to be Dead No   2. Non-Specific Active Suicidal Thoughts  No   Self Injury (Denies SIB)   Elopement (No elopment concerns)   Activity withdrawn   Speech clear;coherent   Medication Sensitivity no observed side effects;no stated side effects   Psychomotor / Gait steady;balanced   Activities of Daily Living   Hygiene/Grooming independent;prompts   Oral Hygiene prompts;independent   Dress prompts;independent   Laundry with supervision   Room Organization independent     "

## 2019-01-04 NOTE — PROGRESS NOTES
"Pt had her tenth treatment today.  Pt reports that she believes she is \"feeling a little better\".  Pt reports her depression level and her anxiety level is 3/10 with 10 the most.  Pt's affect has been blunted but brightens on approach.  Pt continues to report that she is \"scared\" before and after treatment.  Pt denies SI or SIB thinking.  She was able to eat ninety percent of her lunch.      01/04/19 1435   Behavioral Health   Hallucinations denies / not responding to hallucinations   Thinking distractable;poor concentration   Orientation person: oriented;place: oriented;date: oriented   Insight poor   Judgement impaired   Eye Contact at examiner   Affect blunted, flat  (brightens on approach)   Mood mood is calm   Physical Appearance/Attire disheveled;attire appropriate to age and situation   Hygiene neglected grooming - unclean body, hair, teeth   Suicidality (denies SI)   1. Wish to be Dead No   2. Non-Specific Active Suicidal Thoughts  No   Self Injury (denies SIB thinking)   Elopement (none observed)   Activity isolative  (in the milieu at times/some peer interaction)   Speech clear;coherent   Psychomotor / Gait balanced;steady   Activities of Daily Living   Hygiene/Grooming prompts   Oral Hygiene prompts   Dress street clothes;prompts   Room Organization independent     "

## 2019-01-04 NOTE — PROGRESS NOTES
Returned from ECT, alert and oriented X3.  Denies pain, nausea and vomiting.  Is visiting with family.

## 2019-01-04 NOTE — ANESTHESIA POSTPROCEDURE EVALUATION
Anesthesia POST Procedure Evaluation    Patient: Gwen Cash   MRN:     [id number] Gender:   female   Age:    53 year old :      1965        Preoperative Diagnosis: * No pre-op diagnosis entered *   * No procedures listed *   Postop Comments: No value filed.       Anesthesia Type:  General    Reportable Event: NO     PAIN: Uncomplicated   Sign Out status: Comfortable, Well controlled pain     PONV: No PONV   Sign Out status:  No Nausea or Vomiting     Neuro/Psych: Uneventful perioperative course   Sign Out Status: Preoperative baseline; Age appropriate mentation     Airway/Resp.: Uneventful perioperative course   Sign Out Status: Non labored breathing, age appropriate RR; Resp. Status within EXPECTED Parameters     CV: Uneventful perioperative course   Sign Out status: Appropriate BP and perfusion indices; Appropriate HR/Rhythm     Disposition:   Sign Out in:  PACU  Disposition:  Phase II; Home  Recovery Course: Uneventful  Follow-Up: Not required           Last Anesthesia Record Vitals:  CRNA VITALS  2019 1040 - 2019 1140      2019             Resp Rate (set):  8          Last PACU/Preop Vitals:  Vitals:    19 1107 19 1122 19 1134   BP: 167/87 159/74 150/72   Pulse: 102 94 91   Resp: 16 14 16   Temp: 36.8  C (98.2  F)  36.7  C (98  F)   SpO2: 99% 99% 98%         Electronically Signed By: Mayra Hughes MD, 2019, 11:41 AM

## 2019-01-04 NOTE — PROCEDURES
Procedure/Surgery Information   [facility], [place]    Bedside Procedure Note  Date of Service (when I performed the procedure): 01/04/2019    Gwen Cash is a 53 year old female patient.  1. Major depressive disorder, remission status unspecified, unspecified whether recurrent    2. Severe recurrent major depression without psychotic features (H)      Past Medical History:   Diagnosis Date     Calculus of right kidney 1997    had hydronephrosis & treated for mild corinna at that time, calcium phosphate stone s/p lithotripsy      Chronic depressive personality disorder      DUB (dysfunctional uterine bleeding) 2004    work up including tsh ,labs, ecc , gyn eval normal      H/O echocardiogram 11/2011    at [facility] was normal , no valvular issues, done for functional benign murmur noted in past      History of Papanicolaou smear of cervix 9/14/01, 6/21/04    NIL     OCD (obsessive compulsive disorder) 3/4/2017     Panic disorder without agoraphobia 3/4/2017     Tobacco use disorder 3/4/2017     Temp: 97.7  F (36.5  C) Temp src: Oral BP: 109/70 Pulse: 93   Resp: 16          Procedures     Melo Wang     [facility], [place]   ECT Procedure Note   01/04/2019    Gwen Cash [id number]   53 year old 1965     Patient Status: Inpatient    Is this the first in a series of 12 treatments?  No    History and Physical: Reviewed in medical record    Consent: Informed consent was signed by: patient    Date Consent Signed: 12/7/18      Allergies   Allergen Reactions     Metronidazole Unknown     Abstracted data , patient cannot remember      No Clinical Screening - See Comments Hives     Penicillins Unknown     Abstracted data , patient cannot remember      Phenylbutazones        Weight:  93 lbs 0 oz              Indications for ECT:   Medications ineffective         Clinical Narrative:   Patient was admitted with severe depression and OCD.   She's  continuing ECT for depression.  NPO after 2400.  Alert and Oriented x 3.  Mood is improving.  She's somewhat less somatic.           Diagnosis:   Major depression         Pause for the Cause:     Right patient Yes   Right procedure/laterality settings: Yes          Intra-Procedure Documentation:       ECT #: 10   Treatment number this series: 10   Total treatment number: 10     Type of ECT:  Right, unilateral ultrabrief     ECT Medications:   Lactated Ringers:  1 liter   Lidocaine:  40mg   Brevital: 60mg  Caffeine: 500mg (after she's asleep)  Succinyl Choline: 60mg    ECT Strip Summary:   Energy Level: 60 percent  Motor Seizure Duration:  28 seconds  EEG Seizure Duration:   36 seconds    Complications: No    Plan:   Next ECT 1/7/19    Melo Wang MD

## 2019-01-04 NOTE — PROGRESS NOTES
Writer received VM from Pt's brother Jr ([phone]) asked for a call back to discuss the following: according to Jr Dr. Powell had promised we would assist Pt with applying for social security benefits, also they would like to know when Dr. Powell is looking at discharging Pt.     Writer LVM for Jr reported we do not help people apply for social security, however, we can provide information about where to go to apply for social security on discharge instructions. Reported Dr. Dillon has been Pt's attending provider for about two week now, and to Writer's knowledge it will remain Dr. iDllon. Asked for a call back if there are any further questions or concerns.

## 2019-01-05 PROCEDURE — 25000132 ZZH RX MED GY IP 250 OP 250 PS 637: Performed by: PHYSICIAN ASSISTANT

## 2019-01-05 PROCEDURE — 25000132 ZZH RX MED GY IP 250 OP 250 PS 637: Performed by: PSYCHIATRY & NEUROLOGY

## 2019-01-05 PROCEDURE — 25000132 ZZH RX MED GY IP 250 OP 250 PS 637: Performed by: EMERGENCY MEDICINE

## 2019-01-05 PROCEDURE — 12400001 ZZH R&B MH UMMC

## 2019-01-05 RX ADMIN — LEVOTHYROXINE SODIUM 25 MCG: 25 TABLET ORAL at 10:06

## 2019-01-05 RX ADMIN — Medication 500 MG: at 13:56

## 2019-01-05 RX ADMIN — PAROXETINE HYDROCHLORIDE 40 MG: 10 SUSPENSION ORAL at 13:57

## 2019-01-05 RX ADMIN — MELATONIN TAB 3 MG 3 MG: 3 TAB at 21:53

## 2019-01-05 RX ADMIN — BUPROPION HYDROCHLORIDE 100 MG: 100 TABLET, FILM COATED ORAL at 13:56

## 2019-01-05 RX ADMIN — CLONAZEPAM 0.5 MG: 0.5 TABLET ORAL at 13:56

## 2019-01-05 ASSESSMENT — ACTIVITIES OF DAILY LIVING (ADL)
ORAL_HYGIENE: PROMPTS
DRESS: STREET CLOTHES;INDEPENDENT
HYGIENE/GROOMING: PROMPTS

## 2019-01-05 NOTE — PROGRESS NOTES
Pt had a calm shift. She presents as blunt & flat, but brightens on approach. Pt denies psychotic symptoms, SI & SIB. She reports depression at a 2-3, sadness 2-3 and anxiety at a 2-3 on a scale of 1-10. She states she feels hopeful. Pt's goal was to get her morning ECT done and this goal was met. She states she is still scared of the ECT procedure, so it is hard to overcome her anxiety each time she has a morning procedure scheduled. Pt spent evening socializing with her roommate and organizing her room. She ate dinner in the milieu and consumed 80% of her meal tray. She had a visit from family and reports this visit went well. She continues to report a feeling of weakness. She describes this feeling as weakness in her head and a tingling sensation around her lips. She would like the weakness symptoms and her thyroid testing results discussed prior to her discharge. She is concerned the weakness may be a side effect of her medication or ECT.      01/04/19 2141   Behavioral Health   Hallucinations denies / not responding to hallucinations   Thinking poor concentration   Orientation person: oriented;place: oriented;date: oriented;time: oriented   Insight poor   Judgement impaired   Eye Contact at examiner   Affect blunted, flat  (brightens on approach )   Mood mood is calm   Physical Appearance/Attire disheveled;appears older than stated age   Hygiene neglected grooming - unclean body, hair, teeth   Suicidality other (see comments)  (denies )   1. Wish to be Dead No   2. Non-Specific Active Suicidal Thoughts  No   Self Injury other (see comment)  (denies )   Elopement (none reported or observed )   Activity withdrawn  (in milieu, social with select peers )   Speech clear;coherent   Psychomotor / Gait balanced;steady   Activities of Daily Living   Hygiene/Grooming prompts   Oral Hygiene prompts   Dress prompts   Room Organization independent

## 2019-01-05 NOTE — PLAN OF CARE
Emotion/Mood Impairment (Obsessive-Compulsive Signs/Symptoms) (Adult)  Improved Mood Symptoms (Obsessive-Compulsive Signs/Symptoms)  1/5/2019 1554 - Improving by Cira Santillan RN  Optimize Mental State/Mood  1/5/2019 1554 by Cira Santillan RN  Flowsheets  Taken 1/5/2019 1542   Verbalized Emotional State  anxiety;depression;hopefulness  Affect  flat  Emotion/Mood  anxious;facial expression relaxed  Taken 12/22/2018 1606   Emotion/Mood/Affect WDL  ex  Note  Patient's mood continues to improve. Affect is brighter and patient is more animated, smiling and laughing more. Remains isolative and withdrawn, not participating in unit activities. She rates both depression and anxiety at 3/10. States she does not feel anxious except when she thinks about ECT and is already worried about her upcoming treatment. Appetite remains poor and patient refused to eat anything today even after much encouragement. Patient has been drinking water and coffee throughout the day. Patient denies suicidal ideation, plan, intent.

## 2019-01-06 PROCEDURE — 25000132 ZZH RX MED GY IP 250 OP 250 PS 637: Performed by: PSYCHIATRY & NEUROLOGY

## 2019-01-06 PROCEDURE — 12400001 ZZH R&B MH UMMC

## 2019-01-06 PROCEDURE — 25000132 ZZH RX MED GY IP 250 OP 250 PS 637: Performed by: PHYSICIAN ASSISTANT

## 2019-01-06 PROCEDURE — 25000132 ZZH RX MED GY IP 250 OP 250 PS 637: Performed by: EMERGENCY MEDICINE

## 2019-01-06 RX ADMIN — PAROXETINE HYDROCHLORIDE 40 MG: 10 SUSPENSION ORAL at 13:44

## 2019-01-06 RX ADMIN — LEVOTHYROXINE SODIUM 25 MCG: 25 TABLET ORAL at 09:34

## 2019-01-06 RX ADMIN — CLONAZEPAM 0.5 MG: 0.5 TABLET ORAL at 13:44

## 2019-01-06 RX ADMIN — BUPROPION HYDROCHLORIDE 100 MG: 100 TABLET, FILM COATED ORAL at 13:44

## 2019-01-06 RX ADMIN — MELATONIN TAB 3 MG 3 MG: 3 TAB at 21:43

## 2019-01-06 RX ADMIN — Medication 500 MG: at 13:44

## 2019-01-06 ASSESSMENT — ACTIVITIES OF DAILY LIVING (ADL)
ORAL_HYGIENE: PROMPTS
ORAL_HYGIENE: PROMPTS
DRESS: STREET CLOTHES
LAUNDRY: WITH SUPERVISION
HYGIENE/GROOMING: PROMPTS
DRESS: INDEPENDENT
HYGIENE/GROOMING: PROMPTS

## 2019-01-06 ASSESSMENT — MIFFLIN-ST. JEOR: SCORE: 988.7

## 2019-01-06 NOTE — PROGRESS NOTES
[facility], [place]   Psychiatric Progress Note      Impression:   Gwen Cash is a 53 year old female admitted for severe depression and weight loss of 30 lbs. We started ECT on 12/7 to improve severe anxiety, OCD.  We are adjusting medications to target mood.  We are also working with the patient on therapeutic skill building.             Diagnoses and Plan:       Principal Diagnosis: MDD, recurrent, severe without psychotic features. OCD  Unit: station 10  Attending: Santana  Medications: risks/benefits discussed with patient-    - Paxil  40mg daily for compulsive behaviors, anxiety, depression  - Wellbutrin 100mg AM   -klonopin 0.5mg HS   -synthroid 25 mcg daily  - N-Acetyl cysteine 500mg daily  Laboratory/Imaging:  - Upreg neg, CBC wnl and BMP WNL  Consults:  -  Internal medicine, to evaluate synthroid dose.  Patient will be treated in therapeutic milieu with appropriate individual and group therapies as described.     ECT-#9 today  Medical diagnoses to be addressed this admission:   Weight loss of 30 lbs  Hypothyroidism      Legal Status: Voluntary      Safety Assessment:   Checks: Status 15  Precautions: Suicide  Pt has not required locked seclusion or restraints in the past 24 hours to maintain safety, please refer to RN documentation for further details.    The risks, benefits, alternatives and side effects have been discussed and are understood by the patient and other caregivers.     Target symptoms to stabilize: SI, depressed, neurovegetative symptoms, sleep issues and disordered eating  Target disposition: outpatient provider       Attestation:  Patient has been seen and evaluated by me,  Joe Dillon MD          Interim History:   The patient's care was discussed with the treatment team and chart notes were reviewed.    Pt received ECT this morning.   She continues to state that her depression is a little better with ECT.   Her anxiety level, especially about ECT, is  "very high and her family /friends continue to come to the unit on the days of ECT for emotional support. When her thoughts are stuck on the things that provokes anxiety, she is unable to unattach herself from that stimuli and perseverates on it, which is seriously lowering her functioning level in daily life.   After discharge, she will benefit from long-term CBT to improve anxiety.          Medications:       acetylcysteine  500 mg Oral Daily     buPROPion  100 mg Oral Daily     clonazePAM  0.5 mg Oral Daily     influenza vaccine adult (product based on age)  0.5 mL Intramuscular Prior to discharge     levothyroxine  25 mcg Oral QAM AC     PARoxetine  40 mg Oral Daily             Allergies:     Allergies   Allergen Reactions     Metronidazole Unknown     Abstracted data , patient cannot remember      No Clinical Screening - See Comments Hives     Penicillins Unknown     Abstracted data , patient cannot remember      Phenylbutazones             Psychiatric Examination:   /62   Pulse 87   Temp 97.9  F (36.6  C) (Oral)   Resp 16   Ht 1.575 m (5' 2\")   Wt 43.5 kg (96 lb)   SpO2 97%   BMI 17.56 kg/m    Weight is 96 lbs 0 oz  Body mass index is 17.56 kg/m .  Appearance:  Hospital attire, appropriate groom  Attitude:  Cooperative, perseverative on her anxiety  Eye Contact:  fair  Mood:  Extremely anxious  Affect:  restricted  Speech:  coherent  Psychomotor Behavior:  No evidence of tardive dyskinesia, no tic, no tremor  Thought Process:  Perseverative.   Associations:  No loose association  Thought Content:  Obsession with her own anxiety. Denies SI. No HI. No AH. No VH  Insight:  limited  Judgment: limited   Oriented to: time place person   Attention Span and Concentration: limited   Recent and Remote Memory:  fair  Language: able to name objects  Fund of Knowledge: normal  Muscle Strength and Tone: normal  Gait and Station: normal               Labs:   No results found for this or any previous visit (from " the past 24 hour(s)).

## 2019-01-06 NOTE — PROGRESS NOTES
[facility], [place]   Psychiatric Progress Note      Impression:   Gwen Cash is a 53 year old female admitted for severe depression and weight loss of 30 lbs. We started ECT on 12/7 to improve severe anxiety, OCD.  We are adjusting medications to target mood.  We are also working with the patient on therapeutic skill building.             Diagnoses and Plan:       Principal Diagnosis: MDD, recurrent, severe without psychotic features. OCD  Unit: station 10  Attending: Santana  Medications: risks/benefits discussed with patient-    - Paxil  40mg daily for compulsive behaviors, anxiety, depression  - Wellbutrin 100mg AM   -klonopin 0.5mg HS   -synthroid 25 mcg daily  - N-Acetyl cysteine 500mg daily  Laboratory/Imaging:  - Upreg neg, CBC wnl and BMP WNL  Consults:  -  Internal medicine, to evaluate synthroid dose.  Patient will be treated in therapeutic milieu with appropriate individual and group therapies as described.     ECT-#10 today  Medical diagnoses to be addressed this admission:   Weight loss of 30 lbs  Hypothyroidism      Legal Status: Voluntary      Safety Assessment:   Checks: Status 15  Precautions: Suicide  Pt has not required locked seclusion or restraints in the past 24 hours to maintain safety, please refer to RN documentation for further details.    The risks, benefits, alternatives and side effects have been discussed and are understood by the patient and other caregivers.     Target symptoms to stabilize: SI, depressed, neurovegetative symptoms, sleep issues and disordered eating  Target disposition: outpatient provider       Attestation:  Patient has been seen and evaluated by me,  Joe Dillon MD          Interim History:   The patient's care was discussed with the treatment team and chart notes were reviewed.    Pt received ECT this morning.   She continues to state that her depression is a little better with ECT.   Writer discussed using appetite enhancer  "with the patient today, as recommended by dietitian. Patient told writer that she does not want to take appetite enhancer, at least not for now. She also said that she cannot swallow capsule, but she can swallow tablet. Writer offered to see if appetite enhance comes in tablet, but pt stated that writer does not have to do that, as she will not take it.   She also says that she has been talking with Dr. Wang about how many more ECTs she should get before they can discontinue ECT, and Dr. Wang told that probably 2 more-which makes total of 12.          Medications:       acetylcysteine  500 mg Oral Daily     buPROPion  100 mg Oral Daily     clonazePAM  0.5 mg Oral Daily     influenza vaccine adult (product based on age)  0.5 mL Intramuscular Prior to discharge     levothyroxine  25 mcg Oral QAM AC     PARoxetine  40 mg Oral Daily             Allergies:     Allergies   Allergen Reactions     Metronidazole Unknown     Abstracted data , patient cannot remember      No Clinical Screening - See Comments Hives     Penicillins Unknown     Abstracted data , patient cannot remember      Phenylbutazones             Psychiatric Examination:   /62   Pulse 87   Temp 97.9  F (36.6  C) (Oral)   Resp 16   Ht 1.575 m (5' 2\")   Wt 43.5 kg (96 lb)   SpO2 97%   BMI 17.56 kg/m    Weight is 96 lbs 0 oz  Body mass index is 17.56 kg/m .  Appearance:  Hospital attire, appropriate groom  Attitude:  Cooperative, perseverative on her anxiety  Eye Contact:  fair  Mood:  Extremely anxious  Affect:  restricted  Speech:  coherent  Psychomotor Behavior:  No evidence of tardive dyskinesia, no tic, no tremor  Thought Process:  Perseverative.   Associations:  No loose association  Thought Content:  Obsession with her own anxiety. Denies SI. No HI. No AH. No VH  Insight:  limited  Judgment: limited   Oriented to: time place person   Attention Span and Concentration: limited   Recent and Remote Memory:  fair  Language: able to name " objects  Fund of Knowledge: normal  Muscle Strength and Tone: normal  Gait and Station: normal               Labs:   No results found for this or any previous visit (from the past 24 hour(s)).

## 2019-01-06 NOTE — PROGRESS NOTES
"Pt was visible in the milieu for most of the shift, she was observed socializing with peers, walking in the halls, and watching movies with peers in the lounge. Pt's overall affect was full range and bright, her mood was calm. Pt endorsed some depression and anxiety, she rated both 3/10. Pt denied SI, SIB, and psychotic symptoms. Pt reported feeling hopeful about her treatment plan. Pt stated appetite was \"the same, still don't feel hungry but I eat anyways\". Pt told writer she wanted to talk with Dr. Powell about any discharge plans and she would like her family present, she stated \"I don't want to talk to Dr. Dillon, I feel more comfortable with Dr. Powell I mean he's been helping me since the start\". Overall pt had a good shift, there were no major concerns.        01/05/19 2040   Behavioral Health   Hallucinations denies / not responding to hallucinations   Thinking distractable   Orientation person: oriented;place: oriented;date: oriented   Insight poor   Judgement impaired   Eye Contact at examiner   Affect full range affect   Mood mood is calm   Physical Appearance/Attire disheveled;attire appropriate to age and situation   Hygiene neglected grooming - unclean body, hair, teeth;body odor   Suicidality (Denies)   1. Wish to be Dead No   2. Non-Specific Active Suicidal Thoughts  No   Self Injury other (see comment)  (Denies)   Elopement (None observed, no statements made)   Activity other (see comment)  (Present in the milieu, social with peers)   Speech clear;coherent   Psychomotor / Gait balanced;steady   Activities of Daily Living   Hygiene/Grooming prompts   Oral Hygiene prompts   Dress street clothes;independent   Room Organization independent     "

## 2019-01-06 NOTE — PROGRESS NOTES
Patient had an uneventful shift. Spent much of the day in her room but comes out occasionally for coffee and water. Appetite remains poor and patient refused to eat breakfast or lunch. Patient had questions regarding synthroid and was unaware that TSH had been rechecked and was within normal limits on 1/3/19. Patient is requesting to see an internal medicine provider prior to discharge. Patient denies suicidal ideation, plan, intent. Reports increased anxiety regarding tomorrow's ECT procedure.

## 2019-01-07 ENCOUNTER — ANESTHESIA EVENT (OUTPATIENT)
Dept: BEHAVIORAL HEALTH | Facility: CLINIC | Age: 54
End: 2019-01-07

## 2019-01-07 ENCOUNTER — ANESTHESIA (OUTPATIENT)
Dept: BEHAVIORAL HEALTH | Facility: CLINIC | Age: 54
End: 2019-01-07

## 2019-01-07 ENCOUNTER — TELEPHONE (OUTPATIENT)
Dept: FAMILY MEDICINE | Facility: CLINIC | Age: 54
End: 2019-01-07

## 2019-01-07 PROCEDURE — 25000132 ZZH RX MED GY IP 250 OP 250 PS 637: Performed by: PHYSICIAN ASSISTANT

## 2019-01-07 PROCEDURE — 25000132 ZZH RX MED GY IP 250 OP 250 PS 637: Performed by: PSYCHIATRY & NEUROLOGY

## 2019-01-07 PROCEDURE — 99232 SBSQ HOSP IP/OBS MODERATE 35: CPT | Mod: 25 | Performed by: PSYCHIATRY & NEUROLOGY

## 2019-01-07 PROCEDURE — 25000132 ZZH RX MED GY IP 250 OP 250 PS 637: Performed by: EMERGENCY MEDICINE

## 2019-01-07 PROCEDURE — 25000128 H RX IP 250 OP 636: Performed by: PSYCHIATRY & NEUROLOGY

## 2019-01-07 PROCEDURE — 25000128 H RX IP 250 OP 636: Performed by: STUDENT IN AN ORGANIZED HEALTH CARE EDUCATION/TRAINING PROGRAM

## 2019-01-07 PROCEDURE — 25000125 ZZHC RX 250: Performed by: STUDENT IN AN ORGANIZED HEALTH CARE EDUCATION/TRAINING PROGRAM

## 2019-01-07 PROCEDURE — 25000125 ZZHC RX 250: Performed by: PSYCHIATRY & NEUROLOGY

## 2019-01-07 PROCEDURE — 99207 ZZC CDG-MDM COMPONENT: MEETS MODERATE - UP CODED: CPT | Performed by: PSYCHIATRY & NEUROLOGY

## 2019-01-07 PROCEDURE — 12400001 ZZH R&B MH UMMC

## 2019-01-07 PROCEDURE — 90870 ELECTROCONVULSIVE THERAPY: CPT

## 2019-01-07 RX ORDER — CAFFEINE AND SODIUM BENZOATE 125 MG/ML
750 INJECTION, SOLUTION INTRAMUSCULAR; INTRAVENOUS
Status: COMPLETED | OUTPATIENT
Start: 2019-01-07 | End: 2019-01-07

## 2019-01-07 RX ORDER — LIDOCAINE HYDROCHLORIDE 20 MG/ML
40 INJECTION, SOLUTION EPIDURAL; INFILTRATION; INTRACAUDAL; PERINEURAL
Status: COMPLETED | OUTPATIENT
Start: 2019-01-07 | End: 2019-01-07

## 2019-01-07 RX ORDER — BUPROPION HYDROCHLORIDE 75 MG/1
75 TABLET ORAL DAILY
Status: DISCONTINUED | OUTPATIENT
Start: 2019-01-08 | End: 2019-01-10

## 2019-01-07 RX ORDER — CAFFEINE AND SODIUM BENZOATE 125 MG/ML
750 INJECTION, SOLUTION INTRAMUSCULAR; INTRAVENOUS
Status: CANCELLED
Start: 2019-01-07 | End: 2019-01-07

## 2019-01-07 RX ORDER — LIDOCAINE HYDROCHLORIDE 20 MG/ML
40 INJECTION, SOLUTION EPIDURAL; INFILTRATION; INTRACAUDAL; PERINEURAL
Status: CANCELLED
Start: 2019-01-07 | End: 2019-01-07

## 2019-01-07 RX ADMIN — Medication 500 MG: at 13:53

## 2019-01-07 RX ADMIN — LIDOCAINE HYDROCHLORIDE 40 MG: 20 INJECTION, SOLUTION EPIDURAL; INFILTRATION; INTRACAUDAL; PERINEURAL at 10:13

## 2019-01-07 RX ADMIN — CAFFEINE AND SODIUM BENZOATE 750 MG: 125 INJECTION, SOLUTION INTRAMUSCULAR; INTRAVENOUS at 10:15

## 2019-01-07 RX ADMIN — LEVOTHYROXINE SODIUM 25 MCG: 25 TABLET ORAL at 11:36

## 2019-01-07 RX ADMIN — MELATONIN TAB 3 MG 3 MG: 3 TAB at 22:33

## 2019-01-07 RX ADMIN — CLONAZEPAM 0.5 MG: 0.5 TABLET ORAL at 13:53

## 2019-01-07 RX ADMIN — PAROXETINE HYDROCHLORIDE 40 MG: 10 SUSPENSION ORAL at 13:57

## 2019-01-07 RX ADMIN — SODIUM CHLORIDE, POTASSIUM CHLORIDE, SODIUM LACTATE AND CALCIUM CHLORIDE 1000 ML: 600; 310; 30; 20 INJECTION, SOLUTION INTRAVENOUS at 10:14

## 2019-01-07 RX ADMIN — BUPROPION HYDROCHLORIDE 100 MG: 100 TABLET, FILM COATED ORAL at 13:53

## 2019-01-07 RX ADMIN — Medication 60 MG: at 10:14

## 2019-01-07 RX ADMIN — METHOHEXITAL SODIUM 60 MG: 500 INJECTION, POWDER, LYOPHILIZED, FOR SOLUTION INTRAMUSCULAR; INTRAVENOUS; RECTAL at 10:14

## 2019-01-07 ASSESSMENT — ACTIVITIES OF DAILY LIVING (ADL)
DRESS: INDEPENDENT
LAUNDRY: UNABLE TO COMPLETE
HYGIENE/GROOMING: INDEPENDENT
HYGIENE/GROOMING: INDEPENDENT
DRESS: INDEPENDENT
ORAL_HYGIENE: INDEPENDENT
ORAL_HYGIENE: INDEPENDENT

## 2019-01-07 ASSESSMENT — LIFESTYLE VARIABLES: TOBACCO_USE: 1

## 2019-01-07 NOTE — PROGRESS NOTES
[facility], [place]   Psychiatric Progress Note      Impression:   Gwen Cash is a 53 year old female admitted for severe depression and weight loss of 30 lbs. We started ECT on 12/7 to improve severe anxiety, OCD.  We are adjusting medications to target mood.  We are also working with the patient on therapeutic skill building.             Diagnoses and Plan:       Principal Diagnosis: MDD, recurrent, severe without psychotic features. OCD  Unit: station 10  Attending: Wei Powell MD    Medications: risks/benefits discussed with patient-    - Paxil  40mg daily for compulsive behaviors, anxiety, depression  - Wellbutrin 75 mg AM - reduced due to possible worsening of anxiety .   -klonopin 0.5mg HS - for anxiety   -synthroid 25 mcg daily  - N-Acetyl cysteine 500mg daily -for OCD    Laboratory/Imaging:  - Upreg neg, CBC wnl and BMP WNL  Consults:  -  Internal medicine, to evaluate synthroid dose.  Patient will be treated in therapeutic milieu with appropriate individual and group therapies as described.     ECT-#11 today  Medical diagnoses to be addressed this admission:   Weight loss of 30 lbs  Hypothyroidism      Legal Status: Voluntary      Safety Assessment:   Checks: Status 15  Precautions: Suicide  Pt has not required locked seclusion or restraints in the past 24 hours to maintain safety, please refer to RN documentation for further details.    The risks, benefits, alternatives and side effects have been discussed and are understood by the patient and other caregivers.     Target symptoms to stabilize: SI, depressed, neurovegetative symptoms, sleep issues and disordered eating  Target disposition: outpatient provider       Attestation:  Patient has been seen and evaluated by me,  Wei Powell MD          Interim History:   The patient's care was discussed with the treatment team and chart notes were reviewed.    Pt received ECT this morning.   She continues to state that  "her depression is a little better with ECT. Her family and staff think she has made relative improvements since she's been going through ECT. On observation, her hygiene has improved (her personal grooming, taking showers, and trimming her nails are signs of improvement for example). She is eating more, and supplementing with Boost energy drinks. Medications compliant. Will finish with course of ECT this Wednesday. Will plan to meet with patient's family likely on Thurs or Friday, and plan to discharge patient back home then.          Medications:       acetylcysteine  500 mg Oral Daily     buPROPion  100 mg Oral Daily     clonazePAM  0.5 mg Oral Daily     influenza vaccine adult (product based on age)  0.5 mL Intramuscular Prior to discharge     levothyroxine  25 mcg Oral QAM AC     PARoxetine  40 mg Oral Daily             Allergies:     Allergies   Allergen Reactions     Metronidazole Unknown     Abstracted data , patient cannot remember      No Clinical Screening - See Comments Hives     Penicillins Unknown     Abstracted data , patient cannot remember      Phenylbutazones             Psychiatric Examination:   /78   Pulse 92   Temp 98.5  F (36.9  C)   Resp 16   Ht 1.575 m (5' 2\")   Wt 43.5 kg (96 lb)   SpO2 98%   BMI 17.56 kg/m    Weight is 96 lbs 0 oz  Body mass index is 17.56 kg/m .  Appearance:  Hospital attire, appropriate grooming (improved)   Attitude:  Cooperative, perseverative on her anxiety  Eye Contact:  fair  Mood: Anxious but improved presentation  Affect:  restricted- but shows more range   Speech:  coherent  Psychomotor Behavior:  No evidence of tardive dyskinesia, no tic, no tremor  Thought Process:  Perseverative.   Associations:  No loose association  Thought Content:  Obsession with her own anxiety. Denies SI. No HI. No AH. No VH  Insight:  limited  Judgment: limited   Oriented to: time place person   Attention Span and Concentration: limited   Recent and Remote Memory:  " fair  Language: able to name objects  Fund of Knowledge: normal  Muscle Strength and Tone: normal  Gait and Station: normal               Labs:   No results found for this or any previous visit (from the past 24 hour(s)).     Wei Powell MD  [facility]

## 2019-01-07 NOTE — ANESTHESIA PREPROCEDURE EVALUATION
Anesthesia Pre-Procedure Evaluation    Patient: Gwen Cash   MRN:     [id number] Gender:   female   Age:    53 year old :      1965        Preoperative Diagnosis: * No pre-op diagnosis entered *   * No procedures listed *     Past Medical History:   Diagnosis Date     Calculus of right kidney     had hydronephrosis & treated for mild corinna at that time, calcium phosphate stone s/p lithotripsy      Chronic depressive personality disorder      DUB (dysfunctional uterine bleeding)     work up including tsh ,labs, ecc , gyn eval normal      H/O echocardiogram 2011    at [facility] was normal , no valvular issues, done for functional benign murmur noted in past      History of Papanicolaou smear of cervix 01, 04    NIL     OCD (obsessive compulsive disorder) 3/4/2017     Panic disorder without agoraphobia 3/4/2017     Tobacco use disorder 3/4/2017      Past Surgical History:   Procedure Laterality Date      SECTION      x 3     LITHOTRIPSY            Anesthesia Evaluation     . Pt has had prior anesthetic. Type: General and Regional    No history of anesthetic complications          ROS/MED HX    ENT/Pulmonary:     (+)tobacco use, , . .    Neurologic:  - neg neurologic ROS     Cardiovascular:  - neg cardiovascular ROS   (+) ----. : . . . :. . Previous cardiac testing date:results:date: results:ECG reviewed date: results:NSR. date: results:          METS/Exercise Tolerance:  >4 METS   Hematologic:  - neg hematologic  ROS       Musculoskeletal:  - neg musculoskeletal ROS       GI/Hepatic:  - neg GI/hepatic ROS       Renal/Genitourinary:     (+) Nephrolithiasis ,       Endo:  - neg endo ROS       Psychiatric: Comment: Severe anxiety and OCD.    (+) psychiatric history anxiety and other (comment)      Infectious Disease:  - neg infectious disease ROS       Malignancy:         Other:                         PHYSICAL EXAM:   Mental Status/Neuro: A/A/O   Airway: Facies:  "Feasible  Mallampati: I  Mouth/Opening: Full  TM distance: > 6 cm  Neck ROM: Full   Respiratory: Auscultation: CTAB     Resp. Rate: Normal     Resp. Effort: Normal      CV: Rhythm: Regular  Rate: Age appropriate  Heart: Normal Sounds   Comments:      Dental: Normal                  Lab Results   Component Value Date    WBC 7.8 11/16/2018    HGB 12.7 01/03/2019    HCT 46.3 11/16/2018     11/16/2018     01/03/2019    POTASSIUM 4.5 01/03/2019    CHLORIDE 105 01/03/2019    CO2 28 01/03/2019    BUN 20 01/03/2019    CR 0.78 01/03/2019    GLC 82 01/03/2019    JOSE 8.9 01/03/2019    PHOS 3.4 11/29/2018    MAG 2.2 11/29/2018    ALBUMIN 4.9 11/16/2018    PROTTOTAL 8.3 11/16/2018    ALT 17 11/16/2018    AST 11 11/16/2018    ALKPHOS 74 11/16/2018    BILITOTAL 0.4 11/16/2018    TSH 0.53 01/03/2019    T4 0.93 11/16/2018    HCG Negative 11/16/2018       Preop Vitals  BP Readings from Last 3 Encounters:   01/06/19 137/80   03/03/17 126/84    Pulse Readings from Last 3 Encounters:   01/06/19 86   03/03/17 99      Resp Readings from Last 3 Encounters:   01/07/19 16   03/03/17 16    SpO2 Readings from Last 3 Encounters:   01/07/19 96%   03/03/17 96%      Temp Readings from Last 1 Encounters:   01/07/19 36.9  C (98.4  F) (Tympanic)    Ht Readings from Last 1 Encounters:   11/16/18 1.575 m (5' 2\")      Wt Readings from Last 1 Encounters:   01/06/19 43.5 kg (96 lb)    Estimated body mass index is 17.56 kg/m  as calculated from the following:    Height as of 11/16/18: 1.575 m (5' 2\").    Weight as of 1/6/19: 43.5 kg (96 lb).     LDA:  Peripheral IV 01/02/19 Left Hand (Active)   Site Assessment WDL 1/2/2019 11:45 AM   Line Status Infusing 1/2/2019 11:45 AM   Phlebitis Scale 0-->no symptoms 1/2/2019 11:45 AM   Number of days: 5            Assessment:   ASA SCORE: 2       Documentation: H&P complete; Preop Testing complete; Consents complete   Proceeding: Proceed without further delay  Tobacco Use:  NO Active use of " Tobacco/UNKNOWN Tobacco use status     Plan:   Anes. Type:  General      Induction:  IV (Standard)   Airway: Native Airway   Access/Monitoring: PIV   Maintenance: Balanced   Emergence: Procedure Site   Logistics: Same Day Surgery     Postop Pain/Sedation Strategy:  Standard-Options: Opioids PRN     PONV Management:  Adult Risk Factors: Female, Non-Smoker, Postop Opioids     CONSENT: Direct conversation      Blood Products: Consent Deferred (Minimal Blood Loss)                             Anthony Verma MD

## 2019-01-07 NOTE — PROGRESS NOTES
Pt had a calm shift. She became tearful after visit with her family because her Mom had fallen and had visible bruising from this fall. She was in the milieu and social with select peers. During check-in she rated her depression at a 3 and her anxiety at a 5-6 on a scale of 1-10. She explained that her anxiety is higher because she has ECT #11 in the morning. She stated she feels hopeful and denied psychotic symptoms, SI & SIB. She continues to report a feeling of weakness that comes and goes.      01/06/19 2155   Behavioral Health   Hallucinations denies / not responding to hallucinations   Thinking poor concentration   Orientation person: oriented;place: oriented;date: oriented;time: oriented   Memory baseline memory   Insight poor   Judgement impaired   Eye Contact at examiner   Affect incongruent   Mood mood is calm   Physical Appearance/Attire untidy   Hygiene neglected grooming - unclean body, hair, teeth   Suicidality other (see comments)  (denies)   1. Wish to be Dead No   2. Non-Specific Active Suicidal Thoughts  No   Self Injury other (see comment)  (denies)   Elopement (no behaviors observed )   Activity other (see comment)  (in milieu, social with select peers )   Speech clear;coherent   Psychomotor / Gait balanced;steady   Activities of Daily Living   Hygiene/Grooming prompts   Oral Hygiene prompts   Dress independent   Room Organization independent

## 2019-01-07 NOTE — PROGRESS NOTES
Writer attempted to schedule post-hospitalization/PCP visit with Pt's preferred provider, Lisy Blum PA-C at [facility], however she is not taking new clients.  sent message to Lisy Blum to see if she would approve new Pt referral, and  said it typically takes one business day for providers to respond.  encouraged Writer to call back tomorrow afternoon to see if Pt has been approved. There are only three providers that are taking new patients at [facility] and they are all male providers.     Received phone call from Analilia at [facility] office they received inquiry about Pt referral for Lisy Blum they contacted Writer because provider is off this week, therefore will not be getting back to us till next week. Writer stated Pt is particular about who she sees regarding PCP and will provide phone number on AVS so Pt can follow-up next week regarding whether Lisy Blum can see Pt for ongoing care.

## 2019-01-07 NOTE — PROCEDURES
Procedure/Surgery Information   [facility], [place]    Bedside Procedure Note  Date of Service (when I performed the procedure): 01/07/2019    Gwen Cash is a 53 year old female patient.  1. Major depressive disorder, remission status unspecified, unspecified whether recurrent    2. Severe recurrent major depression without psychotic features (H)      Past Medical History:   Diagnosis Date     Calculus of right kidney 1997    had hydronephrosis & treated for mild corinna at that time, calcium phosphate stone s/p lithotripsy      Chronic depressive personality disorder      DUB (dysfunctional uterine bleeding) 2004    work up including tsh ,labs, ecc , gyn eval normal      H/O echocardiogram 11/2011    at [facility] was normal , no valvular issues, done for functional benign murmur noted in past      History of Papanicolaou smear of cervix 9/14/01, 6/21/04    NIL     OCD (obsessive compulsive disorder) 3/4/2017     Panic disorder without agoraphobia 3/4/2017     Tobacco use disorder 3/4/2017     Temp: 98.3  F (36.8  C) Temp src: Tympanic BP: 137/80 Pulse: 86   Resp: 16          Procedures     Melo Wang     [facility], [place]   ECT Procedure Note   01/07/2019    Gwen Cash [id number]   53 year old 1965     Patient Status: Inpatient    Is this the first in a series of 12 treatments?  No    History and Physical: Reviewed in medical record    Consent: Informed consent was signed by: patient    Date Consent Signed: 12/7/18      Allergies   Allergen Reactions     Metronidazole Unknown     Abstracted data , patient cannot remember      No Clinical Screening - See Comments Hives     Penicillins Unknown     Abstracted data , patient cannot remember      Phenylbutazones        Weight:  96 lbs 0 oz              Indications for ECT:   Medications ineffective         Clinical Narrative:   Patient was admitted with severe depression and OCD.   She's  continuing ECT for depression.  NPO after 2400.  Alert and Oriented x 3.  Mood is improving.  She'll go home this week.            Diagnosis:   Major depression         Pause for the Cause:     Right patient Yes   Right procedure/laterality settings: Yes          Intra-Procedure Documentation:       ECT #: 11   Treatment number this series: 11   Total treatment number: 11     Type of ECT:  Right, unilateral ultrabrief     ECT Medications:   Lactated Ringers:  1 liter   Lidocaine:  40mg   Brevital: 60mg  Caffeine: 750mg (after she's asleep)  Succinyl Choline: 60mg    ECT Strip Summary:   Energy Level: 60 percent  Motor Seizure Duration:   44 seconds  EEG Seizure Duration:    81 seconds    Complications: No    Plan:   Next ECT 1/9/19.   That will be her final treatment.  She wants no outpatient ECT after discharge. .       Melo Wang MD

## 2019-01-07 NOTE — ANESTHESIA POSTPROCEDURE EVALUATION
Anesthesia POST Procedure Evaluation    Patient: Gwen Cash   MRN:     [id number] Gender:   female   Age:    53 year old :      1965        Preoperative Diagnosis: * No pre-op diagnosis entered *   * No procedures listed *   Postop Comments: No value filed.       Anesthesia Type:  General    Reportable Event: NO     PAIN: Uncomplicated   Sign Out status: Comfortable, Well controlled pain     PONV: No PONV   Sign Out status:  No Nausea or Vomiting     Neuro/Psych: Uneventful perioperative course   Sign Out Status: Preoperative baseline; Age appropriate mentation     Airway/Resp.: Uneventful perioperative course   Sign Out Status: Non labored breathing, age appropriate RR; Resp. Status within EXPECTED Parameters     CV: Uneventful perioperative course   Sign Out status: Appropriate BP and perfusion indices; Appropriate HR/Rhythm     Disposition:   Sign Out in:  PACU (ECT)  Disposition:  Home  Recovery Course: Uneventful  Follow-Up: Not required           Last Anesthesia Record Vitals:  CRNA VITALS  2019 0954 - 2019 1045      2019             Pulse:  101    SpO2:  100 %    Resp Rate (set):  8          Last PACU/Preop Vitals:  Vitals:    19 0825 19 1024 19 1042   BP:  166/90 171/85   Pulse:  107 109   Resp: 16 16 16   Temp: 36.9  C (98.4  F) 36.8  C (98.3  F)    SpO2: 96% 97% 98%         Electronically Signed By: Anthony Verma MD, 2019, 10:45 AM

## 2019-01-07 NOTE — PLAN OF CARE
Vidya had ECT this morning. She stated she was anxious prior to the treatment, then visibly less anxiuos after the treatment. She stated that feels that ECT have helper her, but she does not know if she wants to do outpatient ECT once she has completed her series.     She is having her last treatment this Wednesday and then discharging sometime soon thereafter.

## 2019-01-07 NOTE — TELEPHONE ENCOUNTER
I called Janey and let her know Lisy out of office for the week and would not know if Lisy could accept Gwen as a new pt or not.     Janey will pass that info onto Gwen who is discharging this week. She added Gwen is very particular who she wants to see.

## 2019-01-08 PROCEDURE — 25000132 ZZH RX MED GY IP 250 OP 250 PS 637: Performed by: PHYSICIAN ASSISTANT

## 2019-01-08 PROCEDURE — 25000132 ZZH RX MED GY IP 250 OP 250 PS 637: Performed by: PSYCHIATRY & NEUROLOGY

## 2019-01-08 PROCEDURE — 25000132 ZZH RX MED GY IP 250 OP 250 PS 637: Performed by: EMERGENCY MEDICINE

## 2019-01-08 PROCEDURE — 12400001 ZZH R&B MH UMMC

## 2019-01-08 PROCEDURE — 99232 SBSQ HOSP IP/OBS MODERATE 35: CPT | Performed by: PSYCHIATRY & NEUROLOGY

## 2019-01-08 PROCEDURE — 99207 ZZC CDG-MDM COMPONENT: MEETS MODERATE - UP CODED: CPT | Performed by: PSYCHIATRY & NEUROLOGY

## 2019-01-08 RX ADMIN — CLONAZEPAM 0.5 MG: 0.5 TABLET ORAL at 13:29

## 2019-01-08 RX ADMIN — LEVOTHYROXINE SODIUM 25 MCG: 25 TABLET ORAL at 09:24

## 2019-01-08 RX ADMIN — Medication 500 MG: at 13:29

## 2019-01-08 RX ADMIN — PAROXETINE HYDROCHLORIDE 40 MG: 10 SUSPENSION ORAL at 13:29

## 2019-01-08 RX ADMIN — BUPROPION HYDROCHLORIDE 75 MG: 75 TABLET, FILM COATED ORAL at 13:29

## 2019-01-08 RX ADMIN — MELATONIN TAB 3 MG 3 MG: 3 TAB at 20:59

## 2019-01-08 ASSESSMENT — ACTIVITIES OF DAILY LIVING (ADL)
HYGIENE/GROOMING: WITH ASSISTANCE
ORAL_HYGIENE: INDEPENDENT
DRESS: STREET CLOTHES

## 2019-01-08 NOTE — PROVIDER NOTIFICATION
"Patient had an unremarkable shift. She has been present in the milieu.   She denies all MH symptoms. Patient did c/o of feeling warm and requested her temperature be taken it was 99.5. Patient is looking forward to discharging later this week. She also feels the ECT treatments are helping her.     SI/SIB/HI: denies   Auditory or visual hallucinations: denies  Anxiety: denies  Depression: denies  Appetite: \"I don't have much of an appetite\" \"Though I ate all my dinner, it's like my stomach doesn't notify me that I should eat, Vinita been feeling this way for quite some time now\"   Hygiene: well groomed   Denies concerns regarding sleep, appetite, medication side effects.        01/07/19 2200   Behavioral Health   Hallucinations denies / not responding to hallucinations   Thinking other (see comment)  (fair)   Orientation person: oriented;place: oriented;date: oriented   Memory baseline memory   Insight insight appropriate to situation   Judgement intact   Eye Contact at examiner   Affect/Mood (WDL) WDL   Affect full range affect   Mood mood is calm   ADL Assessment (WDL) WDL   Physical Appearance/Attire attire appropriate to age and situation   Hygiene well groomed   Suicidality (WDL) WDL   Suicidality other (see comments)  (denies)   1. Wish to be Dead No   2. Non-Specific Active Suicidal Thoughts  No   Self Injury other (see comment)  (denies)   Elopement (WDL) WDL   Activity (WDL) WDL   Speech (WDL) WDL   Medication Sensitivity (WDL) WDL   Psychomotor Gait (WDL) WDL   Overt Agression (WDL) WDL   Coping/Psychosocial   Verbalized Emotional State hopefulness   Plan of Care Reviewed With patient   Patient Agreement with Plan of Care agrees   Safety   Suicidality Status 15   Activities of Daily Living   Hygiene/Grooming independent   Oral Hygiene independent   Dress independent   Laundry unable to complete   Room Organization independent     "

## 2019-01-08 NOTE — PROGRESS NOTES
Pt brightens on approach but is otherwise flat in apprearance. Pt did not eat all day. Pt said she feels better and thinks the ECT has helped. Pt rates dep. At 5 and anxiety at 4 . Pt denies SI or SIB. Pt said she still feels scared about life in general

## 2019-01-09 PROCEDURE — 25000132 ZZH RX MED GY IP 250 OP 250 PS 637: Performed by: PSYCHIATRY & NEUROLOGY

## 2019-01-09 PROCEDURE — 12400001 ZZH R&B MH UMMC

## 2019-01-09 PROCEDURE — 25000132 ZZH RX MED GY IP 250 OP 250 PS 637: Performed by: PHYSICIAN ASSISTANT

## 2019-01-09 PROCEDURE — 25000132 ZZH RX MED GY IP 250 OP 250 PS 637: Performed by: EMERGENCY MEDICINE

## 2019-01-09 RX ADMIN — BUPROPION HYDROCHLORIDE 75 MG: 75 TABLET, FILM COATED ORAL at 13:33

## 2019-01-09 RX ADMIN — LEVOTHYROXINE SODIUM 25 MCG: 25 TABLET ORAL at 08:56

## 2019-01-09 RX ADMIN — CLONAZEPAM 0.5 MG: 0.5 TABLET ORAL at 13:32

## 2019-01-09 RX ADMIN — MELATONIN TAB 3 MG 3 MG: 3 TAB at 22:32

## 2019-01-09 RX ADMIN — PAROXETINE HYDROCHLORIDE 40 MG: 10 SUSPENSION ORAL at 13:33

## 2019-01-09 RX ADMIN — Medication 500 MG: at 13:32

## 2019-01-09 ASSESSMENT — ACTIVITIES OF DAILY LIVING (ADL)
DRESS: INDEPENDENT
HYGIENE/GROOMING: INDEPENDENT
ORAL_HYGIENE: INDEPENDENT
LAUNDRY: WITH SUPERVISION

## 2019-01-09 NOTE — PLAN OF CARE
BEHAVIORAL TEAM DISCUSSION    Participants: Doctor Dillon; Alejandrina HARTMANNSW; Cira Santillan RN  Progress: Improved mood, denies SI/SIB and hallucinations. Verbalizing less obsessive thoughts.   Continued Stay Criteria/Rationale: Refused ECT today.  Minimal eating. Blunted affect and anxious mood.   Medical/Physical: Deferred to medical.   Precautions:   Behavioral Orders   Procedures    Code 1 - Restrict to Unit    Code 2     Only during ECT days (Mon, Wed, Fri)    Electroconvulsive therapy     ECT every Monday, Wednesday, and Friday    Electroconvulsive therapy     Series of up to 12 treatments. Begin Date: 12/7/18     Treating Psychiatrist providing ECT:  Kathleen     Notified on:  12/5/18    Fall precautions    Routine Programming     As clinically indicated    Status 15     Every 15 minutes.     Plan: Recommendation is aftercare at [facility] program.  Pt ambivalent regarding referral at this point.   Rationale for change in precautions or plan: no change.

## 2019-01-09 NOTE — PLAN OF CARE
"Behavior Regulation (Impulse Control) Impairment (Obsessive-Compulsive Signs/Symptoms) (Adult)  Promote Behavior and Impulse Control  1/9/2019 1501 by Cira Santillan RN  Flowsheets  Taken 1/9/2019 1452   Delusions  obsessions;somatic  Intellectual Performance WDL  WDL  Judgment and Insight  denies responsibility  Thought Content  helplessness  Thought Process WDL  ex  Note  Patient refused her ECT treatment this morning because she felt dizzy. Treatment rescheduled for Friday at 0915. Writer spent 30 minutes on the phone with patient's brother and sister who are concerned that she is not improving and will not follow through with outpatient treatment plan. Would like to have family meeting with Dr. Powell Friday 1/11/19 at 1400 if possible. Patient was given information about partial hospitalization program.     Patient was tearful when explaining that she did not believe she could go through with ECT this morning and then again became tearful after refusing because \"I feel bad that I didn't go.\" Patient did eat 75% of her lunch. Reports increased anxiety, minimal depression. Denies suicidal ideation, plan, intent.      "

## 2019-01-09 NOTE — PROGRESS NOTES
Writer talked to pt about recommendation for aftercare support through [facility].  Pt stated that she wasn't sure she would be willing to participate.  She sited transportation as an issue.  Writer asked if transportation would be arranged if she would be willing to attend and pt remained hesitant.  Writer provided pt information on [facility] an [facility] programs and told her to think about it overnight and talk to doctor about it tomorrow.

## 2019-01-09 NOTE — PROGRESS NOTES
Submitted referral to [facility] through their website.  Awaiting response from them to proceed with referral.

## 2019-01-09 NOTE — PLAN OF CARE
"48 Hour Nursing Assessment    Pt has been in bed all shift. Did not eat supper, \"I am just not hungry\". Drinking fluids (ice water). States she has been feeling dizzy today and feels safer in bed. VS at 1600 T 98.2, P 90, /63. Wt on 1/06/19 96 lbs. Pt did drink boost with much encouragement. At 2108 P 82, /81.      Denies SI/SIB. Denies AH. Affect blunted/sad. Mood anxious. Pt verbalizing less obsessive thought. Pt continues on suicide and fall precautions.  "

## 2019-01-09 NOTE — PROGRESS NOTES
CLINICAL NUTRITION SERVICES - REASSESSMENT NOTE      Nutrition Prescription     RECOMMENDATIONS FOR MDs/PROVIDERS TO ORDER:  1. Strongly encouraged to continue to recommend trialing appetite stimulant (ie Megace, Miranol, Remeron) given continuation of poor appetite for prolonged period of time, significant wt loss over past year, and difficulty putting on wt this admission despite intakes. May do outpatient if unable to inpatient.  2. Please continue to encourage po intakes and goal of 2 Boost/day.     Malnutrition Status:   Non-severe malnutrition in the context of environmental or social circumstances.      Recommendations already ordered by Registered Dietitian (RD):  - Continue Boost BID with meals  - Continue Magic Cup and Gelatein with lunch and dinner     Future/Additional Recommendations:  1. Monitor po intakes and wt trends. Consider need to add/discontinue supplement or adjust flavors pending trends and pt preferences.    2. Continue to encourage 2 Boost/day      EVALUATION OF THE PROGRESS TOWARD GOALS   Diet: Regular  Snacks/supplements: Magic Cup BID (moose with lunch and dinner), Gelatein BID (cherry with lunch and dinner); Boost Plus TID (moose with lunch, strawberry with dinner)  Intake: Appetite remains poor-fair. Skips breakfast at baseline. Eating typically 50-75% of lunch and dinner. Pt noted to not eat breakfast, lunch, or dinner yesterday but did drink Boost. Refused breakfast and lunch on 1/6 also.     NEW FINDINGS   - Wt: As of 1/6, wt is up 3 lb from 1/1. Overall wt up 1 lb this admission. Pt has been trying to gain wt. Underweight BMI. Pt had reported UBW of 125 lb. Per previous assessment, She states that in Jan/Feb, she went to the doctor and was 119 lbs. No recent wt history available in CareEverywhere, making it difficult to quantify wt loss. Per pt's report, wt loss of at least 23 lb (19%) over the past ~1 year. She is concerned about her wt loss and inability to gain wt.  - Continues  ECT  - Potential discharge this week?  - Pt unsure of if she wants to start appetite stimulant at this time    MALNUTRITION   % Intake: < 75% for >/= 1 month (non-severe) - overall trend  % Weight Loss: Up to 20% in 1 year (non-severe)  Subcutaneous Fat Loss: Facial region: Mild-moderate  Muscle Loss: Temporal, Facial & jaw region, Scapular bone and Thoracic region (clavicle, acromium bone, deltoid, trapezius, pectoral): Mild-Moderate  Fluid Accumulation/Edema: None noted  Malnutrition Diagnosis: Non-severe malnutrition in the context of environmental or social circumstances.      Previous Goals   1. Patient to consume % of nutritionally adequate meal trays TID, or the equivalent with supplements/snacks.  Evaluation: Not met  2. BMI to progress towards Normal BMI status (>18.5 kg/m2).  Evaluation: Not met, but improving over past week  3. Patient to drink at least 2 Boost/day.  Evaluation: Suspect met     Previous Nutrition Diagnosis  Inadequate oral intake related to poor appetite, decreasing desire to eat as evidenced by variable intakes at times and overall reduced intakes over the past several months, and wt loss over the past ~1 year.    Evaluation: No change     CURRENT NUTRITION DIAGNOSIS  Inadequate oral intake related to poor appetite, decreasing desire to eat as evidenced by skipping meals at times, overall reduced intakes over the past several months, and wt loss over the past ~1 year.     INTERVENTIONS  Implementation  - Continue Boost BID with meals  - Continue Magic Cup and Gelatein with lunch and dinner  - Encouraged meals TID + snacks on unit. Encouraged pt to try to eat breakfast or drink Boost for breakfast a couple times this next week. Encouraged pt to discuss appetite stimulant further with provider. Discussed ways that pt can try to increase calories in her diet at home (adding butter, oils, making shakes and smoothies, Boost Plus or equivalent, carnation instant breakfast, higher %fat  milk).      Goals  Patient to consume % of nutritionally adequate meal trays TID, or the equivalent with supplements/snacks.     Monitoring/Evaluation  Progress toward goals will be monitored and evaluated per protocol.     Stephanie Trejo RD, BRIANNA  Unit pgr: [phone]

## 2019-01-09 NOTE — PROGRESS NOTES
[facility], [place]   Psychiatric Progress Note      Impression:   Gwen Cash is a 53 year old female admitted for severe depression and weight loss of 30 lbs. We started ECT on 12/7 to improve severe anxiety, OCD.  We are adjusting medications to target mood.  We are also working with the patient on therapeutic skill building.             Diagnoses and Plan:       Principal Diagnosis: MDD, recurrent, severe without psychotic features. OCD  Unit: station 10  Attending: Wei Powell MD    Medications: risks/benefits discussed with patient-    - Paxil  40mg daily for compulsive behaviors, anxiety, depression  - Wellbutrin 75 mg AM - reduced due to possible worsening of anxiety .   -klonopin 0.5mg HS - for anxiety   -synthroid 25 mcg daily  - N-Acetyl cysteine 500mg daily -for OCD    Laboratory/Imaging:  - Upreg neg, CBC wnl and BMP WNL  Consults:  -  Internal medicine, to evaluate synthroid dose.  Patient will be treated in therapeutic milieu with appropriate individual and group therapies as described.     ECT-#11 today  Medical diagnoses to be addressed this admission:   Weight loss of 30 lbs  Hypothyroidism      Legal Status: Voluntary      Safety Assessment:   Checks: Status 15  Precautions: Suicide  Pt has not required locked seclusion or restraints in the past 24 hours to maintain safety, please refer to RN documentation for further details.    The risks, benefits, alternatives and side effects have been discussed and are understood by the patient and other caregivers.     Target symptoms to stabilize: SI, depressed, neurovegetative symptoms, sleep issues and disordered eating  Target disposition: outpatient provider       Attestation:  Patient has been seen and evaluated by me,  Wei Powell MD          Interim History:   The patient's care was discussed with the treatment team and chart notes were reviewed.    Continues to endorse subjective improvement after getting ECT  "and being in the hospital. She is looking forward to being home. Her OCD traits seem to continue, being anxious about her after care plan, etc, but relatively lesser intensity compared to initially in her hospitalization. She seems to have been told about outpatient follow up resources at [place], however given this  Patient's lack of volitional drive, it would be recommended that we make the referral for her partial hospitazation if possible. It would be good to have adequate outpatient resources to give this patient structure and theraputic support.          Medications:       acetylcysteine  500 mg Oral Daily     buPROPion  75 mg Oral Daily     clonazePAM  0.5 mg Oral Daily     influenza vaccine adult (product based on age)  0.5 mL Intramuscular Prior to discharge     levothyroxine  25 mcg Oral QAM AC     PARoxetine  40 mg Oral Daily             Allergies:     Allergies   Allergen Reactions     Metronidazole Unknown     Abstracted data , patient cannot remember      No Clinical Screening - See Comments Hives     Penicillins Unknown     Abstracted data , patient cannot remember      Phenylbutazones             Psychiatric Examination:   /63   Pulse 90   Temp 98.2  F (36.8  C) (Tympanic)   Resp 16   Ht 1.575 m (5' 2\")   Wt 43.5 kg (96 lb)   SpO2 98%   BMI 17.56 kg/m    Weight is 96 lbs 0 oz  Body mass index is 17.56 kg/m .  Appearance:  Hospital attire, appropriate grooming (improved)   Attitude:  Cooperative, perseverative on her anxiety  Eye Contact:  fair  Mood: Anxious but improved presentation  Affect:  restricted- but shows more range   Speech:  coherent  Psychomotor Behavior:  No evidence of tardive dyskinesia, no tic, no tremor  Thought Process:  Perseverative.   Associations:  No loose association  Thought Content:  Has obsessive tendencies. Denies SI. No HI. No AH. No VH  Insight:  limited  Judgment: limited   Oriented to: time place person   Attention Span and Concentration: limited   Recent " and Remote Memory:  fair  Language: able to name objects  Fund of Knowledge: normal  Muscle Strength and Tone: normal  Gait and Station: normal               Labs:   No results found for this or any previous visit (from the past 24 hour(s)).     Wei Powell MD  [facility]

## 2019-01-10 PROBLEM — F33.2 SEVERE RECURRENT MAJOR DEPRESSION WITHOUT PSYCHOTIC FEATURES (H): Status: ACTIVE | Noted: 2018-12-05

## 2019-01-10 PROBLEM — F42.2 MIXED OBSESSIONAL THOUGHTS AND ACTS: Status: ACTIVE | Noted: 2019-01-10

## 2019-01-10 PROCEDURE — 25000132 ZZH RX MED GY IP 250 OP 250 PS 637: Performed by: PHYSICIAN ASSISTANT

## 2019-01-10 PROCEDURE — 99231 SBSQ HOSP IP/OBS SF/LOW 25: CPT | Performed by: PSYCHIATRY & NEUROLOGY

## 2019-01-10 PROCEDURE — 25000132 ZZH RX MED GY IP 250 OP 250 PS 637: Performed by: PSYCHIATRY & NEUROLOGY

## 2019-01-10 PROCEDURE — 12400001 ZZH R&B MH UMMC

## 2019-01-10 PROCEDURE — 25000132 ZZH RX MED GY IP 250 OP 250 PS 637: Performed by: EMERGENCY MEDICINE

## 2019-01-10 RX ORDER — PAROXETINE 10 MG/5ML
40 SUSPENSION ORAL DAILY
Qty: 600 ML | Refills: 1 | Status: SHIPPED | OUTPATIENT
Start: 2019-01-11 | End: 2019-01-11

## 2019-01-10 RX ORDER — PAROXETINE 10 MG/5ML
40 SUSPENSION ORAL DAILY
Qty: 600 ML | Refills: 1 | Status: CANCELLED | OUTPATIENT
Start: 2019-01-11 | End: 2019-03-12

## 2019-01-10 RX ORDER — CLONAZEPAM 0.5 MG/1
0.5 TABLET ORAL DAILY
Qty: 30 TABLET | Refills: 0 | Status: SHIPPED | OUTPATIENT
Start: 2019-01-10 | End: 2022-06-03

## 2019-01-10 RX ORDER — BUPROPION HCL 100 MG
50 TABLET ORAL DAILY
Status: DISCONTINUED | OUTPATIENT
Start: 2019-01-10 | End: 2019-01-11 | Stop reason: HOSPADM

## 2019-01-10 RX ORDER — LANOLIN ALCOHOL/MO/W.PET/CERES
3 CREAM (GRAM) TOPICAL
Qty: 30 TABLET | Refills: 1 | Status: SHIPPED | OUTPATIENT
Start: 2019-01-10 | End: 2019-02-09

## 2019-01-10 RX ORDER — BUPROPION HYDROCHLORIDE 100 MG/1
50 TABLET ORAL DAILY
Qty: 2 TABLET | Refills: 0 | Status: SHIPPED | OUTPATIENT
Start: 2019-01-10 | End: 2019-01-23

## 2019-01-10 RX ORDER — CLONAZEPAM 0.5 MG/1
0.5 TABLET ORAL DAILY
Qty: 30 TABLET | Refills: 0 | Status: CANCELLED | OUTPATIENT
Start: 2019-01-10 | End: 2019-02-09

## 2019-01-10 RX ORDER — LEVOTHYROXINE SODIUM 25 UG/1
25 TABLET ORAL
Qty: 30 TABLET | Refills: 1 | Status: SHIPPED | OUTPATIENT
Start: 2019-01-11 | End: 2019-06-06

## 2019-01-10 RX ORDER — LANOLIN ALCOHOL/MO/W.PET/CERES
3 CREAM (GRAM) TOPICAL
Qty: 30 TABLET | Refills: 1 | Status: CANCELLED | OUTPATIENT
Start: 2019-01-10 | End: 2019-02-09

## 2019-01-10 RX ORDER — LEVOTHYROXINE SODIUM 25 UG/1
25 TABLET ORAL
Qty: 30 TABLET | Refills: 1 | Status: CANCELLED | OUTPATIENT
Start: 2019-01-11 | End: 2019-03-12

## 2019-01-10 RX ORDER — BUPROPION HYDROCHLORIDE 100 MG/1
50 TABLET ORAL DAILY
Qty: 2 TABLET | Refills: 0 | Status: CANCELLED | OUTPATIENT
Start: 2019-01-10 | End: 2019-01-14

## 2019-01-10 RX ADMIN — Medication 50 MG: at 18:14

## 2019-01-10 RX ADMIN — MELATONIN TAB 3 MG 3 MG: 3 TAB at 22:36

## 2019-01-10 RX ADMIN — CLONAZEPAM 0.5 MG: 0.5 TABLET ORAL at 13:56

## 2019-01-10 RX ADMIN — PAROXETINE HYDROCHLORIDE 40 MG: 10 SUSPENSION ORAL at 13:57

## 2019-01-10 RX ADMIN — LEVOTHYROXINE SODIUM 25 MCG: 25 TABLET ORAL at 08:40

## 2019-01-10 RX ADMIN — Medication 500 MG: at 13:56

## 2019-01-10 ASSESSMENT — ACTIVITIES OF DAILY LIVING (ADL)
ORAL_HYGIENE: PROMPTS
DRESS: STREET CLOTHES;INDEPENDENT
ORAL_HYGIENE: INDEPENDENT
HYGIENE/GROOMING: INDEPENDENT
LAUNDRY: UNABLE TO COMPLETE
LAUNDRY: WITH SUPERVISION
HYGIENE/GROOMING: PROMPTS
DRESS: INDEPENDENT

## 2019-01-10 NOTE — PROGRESS NOTES
"Writer spoke with Pt's brother Jr MORSE. We discussed Pt discharge tomorrow, he again asked about applying for social security disability benefits for his sister. Writer reiterated as an inpatient hospital mental health provider we typically do not provide doctor letters, encouraged them to speak with Dr. Mercedes Aguero Pt's outpatient psychiatric medication provider regarding a letter for social security. Explained the reasoning to contact outpatient psychiatric provider rather than inpatient. Jr expressed understanding of this. Reported general information about how to begin applying for social security will be located on Pt's AVS. Jr expressed concerns about his sister's transportation if she participates in [facility] programming everyday, Writer reported Pt can have transportation through her health insurance company for all medical (and mental health) related appointments, which would include PHP/IOP. Writer reported the [employer] [facility] information will be located on Pt's AVS and it is also located on the back of her insurance card.     Received phone call from Main at [facility], asked about online referral sent by [facility] yesterday. Asked if Pt would like to speak with her regarding their IOP/PHP programming. Writer went and discussed this with Pt, who refused at first stated \"I'm not ready to do this now\". Writer stated Main from [facility] is on the phone now and would like to answer any questions Pt may have. Pt reported she is worried about transportation. Writer transferred call to Pt in lounge area.     There was some concern amongst the treatment team regarding whether Pt was going to discharge home where there is no heat due to a broken/not working furnace. Writer requested to find out more information from Pt before looking into a possible adult protection report.      and Dr. Powell met with Pt discussed where she will be discharging to, Pt stated she will " be going to her mother's house where there is heat, working appliances, access to food and basic human necessities. Went on to discuss [facility]  referral, Pt expressed interest in working on her OCD symptoms, signed ISAIAH for [facility]. Discussed the transportation [employer] [facility] will provide Pt (again) reiterated what was explained prior and to Pt's brother. Writer stated a definite intake date NEEDS to be known before scheduling rides with health insurance company, and [facility] first needs to conduct an over the phone screening so their intake can review information with psychologists who will then determine level of care needed for Pt. Writer agreed to call [facility] to discuss scheduling an initial screening for possible placement into their programming. Pt asked about Social Security and whether Writer will provide assistance with this, reiterated what Writer has said to Pt before and what was said to Pt's brother (see above). Discussed Pt seeking out a [facility] CM who could assist Pt with how to fill out SSD application and would know more about how to get this process started. Will provide [facility]'s phone number, explained what they do for people.     Writer HARISH for Inez Kelly at [facility] ([phone]) stated we would like to set-up screening for OCD/Anxiety PHP or IOP programming. Asked for a call back to schedule, reported Pt will be discharging tomorrow afternoon so if we could do this before discharge that would be ideal.     Received VM from Inez at [facility] she would be able to screen Pt tomorrow (1/11/19) at 10:30 am asked for a call back to confirm. Asked for Pt clinical information to be faxed to her @ [phone].     Writer LVMIYA for Inez confirmed screening for tomorrow (1/11/19 at 10:30 am), provided unit contact phone number for call tomorrow. Asked for a call back if there are any questions or concerns.     Writer faxed Inez pertinent Pt  hospital information to [phone]    Shared information regarding screening call with Pt. Added information to brain board.

## 2019-01-10 NOTE — PROGRESS NOTES
[facility], [place]   Psychiatric Progress Note      Impression:   Gwen Cash is a 53 year old female admitted for severe depression and weight loss of 30 lbs. We started ECT on 12/7 to improve severe anxiety, OCD.  We are adjusting medications to target mood.  We are also working with the patient on therapeutic skill building.             Diagnoses and Plan:       Principal Diagnosis: MDD, recurrent, severe without psychotic features. OCD  Unit: station 10  Attending: Wei Powell MD    Medications: risks/benefits discussed with patient-    - Paxil  40mg daily for compulsive behaviors, anxiety, depression  - Wellbutrin 75 mg AM - reduced due to possible worsening of anxiety .   -klonopin 0.5mg HS - for anxiety   -synthroid 25 mcg daily  - N-Acetyl cysteine 500mg daily -for OCD    Laboratory/Imaging:  - Upreg neg, CBC wnl and BMP WNL  Consults:  -  Internal medicine, to evaluate synthroid dose.  Patient will be treated in therapeutic milieu with appropriate individual and group therapies as described.     ECT-#11 today  Medical diagnoses to be addressed this admission:   Weight loss of 30 lbs  Hypothyroidism      Legal Status: Voluntary      Safety Assessment:   Checks: Status 15  Precautions: Suicide  Pt has not required locked seclusion or restraints in the past 24 hours to maintain safety, please refer to RN documentation for further details.    The risks, benefits, alternatives and side effects have been discussed and are understood by the patient and other caregivers.     Target symptoms to stabilize: SI, depressed, neurovegetative symptoms, sleep issues and disordered eating  Target disposition: outpatient provider       Attestation:  Patient has been seen and evaluated by me,  Wei Powell MD          Interim History:   The patient's care was discussed with the treatment team and chart notes were reviewed.    Continues to endorse subjective improvement after getting ECT  "and being in the hospital. Refused ECT on Wednesday, feeling that she does not need anymore. She is looking forward to being home. Her OCD traits seem to continue, being anxious about her after care plan, etc, but relatively lesser intensity compared to initially in her hospitalization. She seems to have been told about therapy follow up resources at [place]. Plan for phone screening with [facility] tomorrow morning. She plans to go home to her mother's home, which has adequate living amenities (heat, working appliances, running water, etc). Her family is supportive and will help her get to her post-hospital therapy services.          Medications:       buPROPion  50 mg Oral Daily     clonazePAM  0.5 mg Oral Daily     influenza vaccine adult (product based on age)  0.5 mL Intramuscular Prior to discharge     levothyroxine  25 mcg Oral QAM AC     PARoxetine  40 mg Oral Daily             Allergies:     Allergies   Allergen Reactions     Metronidazole Unknown     Abstracted data , patient cannot remember      No Clinical Screening - See Comments Hives     Penicillins Unknown     Abstracted data , patient cannot remember      Phenylbutazones             Psychiatric Examination:   /78 (BP Location: Left arm)   Pulse 88   Temp 98.8  F (37.1  C) (Oral)   Resp 16   Ht 1.575 m (5' 2\")   Wt 43.5 kg (96 lb)   SpO2 98%   BMI 17.56 kg/m    Weight is 96 lbs 0 oz  Body mass index is 17.56 kg/m .  Appearance:  Hospital attire, appropriate grooming (improved)   Attitude:  Cooperative, perseverative on her anxiety  Eye Contact:  fair  Mood: Anxious but improved presentation  Affect:  restricted- but shows more range   Speech:  coherent  Psychomotor Behavior:  No evidence of tardive dyskinesia, no tic, no tremor  Thought Process:  Perseverative.   Associations:  No loose association  Thought Content:  Has obsessive tendencies. Denies SI. No HI. No AH. No VH  Insight:  limited  Judgment: limited   Oriented to: time place person "   Attention Span and Concentration: limited   Recent and Remote Memory:  fair  Language: able to name objects  Fund of Knowledge: normal  Muscle Strength and Tone: normal  Gait and Station: normal               Labs:   No results found for this or any previous visit (from the past 24 hour(s)).     Wei Powell MD  [facility]iat[facility]

## 2019-01-10 NOTE — PROGRESS NOTES
"The pt was anxious with blunted and sad affect, isolative to her room, withdrawn from others, occasionally visible in the milieu watching tv or playing with therapy dog. Pt reports eating all of her dinner except for the bread. During check-in pt reported feeling more \"sad\" today. She endorses 3/10 depression and 4/10 anxiety. Pt continues to be concerned about generalized weakness she is having, which is why she says she refused ECT. Denies SI/SIB/racing thoughts, denies hallucinations. Appetite is poor.     01/09/19 2200   Behavioral Health   Hallucinations denies / not responding to hallucinations   Thinking distractable;poor concentration   Orientation person: oriented;place: oriented;date: oriented;time: oriented   Memory baseline memory   Insight poor   Judgement impaired   Eye Contact at examiner   Affect blunted, flat;sad   Mood anxious;mood is calm   Physical Appearance/Attire untidy   Hygiene neglected grooming - unclean body, hair, teeth   Suicidality other (see comments)  (Denies)   1. Wish to be Dead No   2. Non-Specific Active Suicidal Thoughts  No   Self Injury other (see comment)  (Denies)   Elopement (Nothing stated or observed)   Activity isolative;withdrawn   Speech clear;coherent   Medication Sensitivity no stated side effects;no observed side effects   Psychomotor / Gait balanced;steady     "

## 2019-01-10 NOTE — PROGRESS NOTES
"Pt denies SI, SIB, anxiety, pain, racing thoughts, and HI (auditory/visual). She continues to endorse depression, rating her depression at a 3 out of 10 with 10 being the worse. She shares, \"my depression as gotten a lot better as to when I first came in here. I'm a little nervous about leaving tomorrow but it's just nervousness not anxiety. My family is coming tomorrow for a meeting with the doctor and I think I'm leaving tomorrow.\" She rates her sleep as \"good\" and her appetite, \"alright.\" Pt denies pain and any side effects from medication. She is hopeful of her discharge but remains nervous. No other concerns from pt.       01/10/19 1320   Behavioral Health   Hallucinations denies / not responding to hallucinations   Thinking intact   Orientation person: oriented;place: oriented   Memory baseline memory   Insight admits / accepts   Judgement impaired   Eye Contact at examiner   Affect blunted, flat   Mood mood is calm   Physical Appearance/Attire attire appropriate to age and situation   Hygiene neglected grooming - unclean body, hair, teeth;body odor   Suicidality other (see comments)  (denies)   1. Wish to be Dead No   2. Non-Specific Active Suicidal Thoughts  No   Self Injury other (see comment)  (denies)   Elopement (none stated/observed)   Activity withdrawn   Speech clear;coherent   Psychomotor / Gait steady;balanced   Activities of Daily Living   Hygiene/Grooming independent   Oral Hygiene independent   Dress independent   Laundry with supervision   Room Organization independent     "

## 2019-01-11 ENCOUNTER — ANESTHESIA EVENT (OUTPATIENT)
Dept: BEHAVIORAL HEALTH | Facility: CLINIC | Age: 54
End: 2019-01-11

## 2019-01-11 ENCOUNTER — ANESTHESIA (OUTPATIENT)
Dept: BEHAVIORAL HEALTH | Facility: CLINIC | Age: 54
End: 2019-01-11

## 2019-01-11 VITALS
RESPIRATION RATE: 16 BRPM | WEIGHT: 96 LBS | HEART RATE: 94 BPM | BODY MASS INDEX: 17.66 KG/M2 | DIASTOLIC BLOOD PRESSURE: 64 MMHG | TEMPERATURE: 97.3 F | OXYGEN SATURATION: 98 % | HEIGHT: 62 IN | SYSTOLIC BLOOD PRESSURE: 107 MMHG

## 2019-01-11 PROCEDURE — 25000132 ZZH RX MED GY IP 250 OP 250 PS 637: Performed by: PHYSICIAN ASSISTANT

## 2019-01-11 PROCEDURE — 25000132 ZZH RX MED GY IP 250 OP 250 PS 637: Performed by: EMERGENCY MEDICINE

## 2019-01-11 PROCEDURE — 25000132 ZZH RX MED GY IP 250 OP 250 PS 637: Performed by: PSYCHIATRY & NEUROLOGY

## 2019-01-11 RX ORDER — PAROXETINE 10 MG/5ML
40 SUSPENSION ORAL DAILY
Qty: 600 ML | Refills: 1 | Status: SHIPPED | OUTPATIENT
Start: 2019-01-11 | End: 2019-06-06

## 2019-01-11 RX ADMIN — LEVOTHYROXINE SODIUM 25 MCG: 25 TABLET ORAL at 09:00

## 2019-01-11 RX ADMIN — PAROXETINE HYDROCHLORIDE 40 MG: 10 SUSPENSION ORAL at 14:03

## 2019-01-11 RX ADMIN — CLONAZEPAM 0.5 MG: 0.5 TABLET ORAL at 14:03

## 2019-01-11 ASSESSMENT — ENCOUNTER SYMPTOMS
CHILLS: 0
ACTIVITY CHANGE: 1
FEVER: 0
APPETITE CHANGE: 1
SHORTNESS OF BREATH: 0

## 2019-01-11 ASSESSMENT — ACTIVITIES OF DAILY LIVING (ADL)
HYGIENE/GROOMING: HANDWASHING;INDEPENDENT
DRESS: PROMPTS
ORAL_HYGIENE: PROMPTS
LAUNDRY: WITH SUPERVISION

## 2019-01-11 NOTE — PROGRESS NOTES
"Pt was visible in the milieu for most of the shift, she was social with peers and cooperative with staff. Pt's overall affect was full range, her mood was calm. Pt reported feeling some depression and anxiety, she rated depression 3/10 and anxiety 4/10. Pt denied SI, SIB, and hallucinations. Pt ate about 95% of her meal this evening. Pt reported feeling ready to discharge tomorrow, she stated \"I'm a little nervous to go back home, but I'm also really excited\". Overall pt had a good shift, there were no major concerns.        01/10/19 2148   Behavioral Health   Hallucinations denies / not responding to hallucinations   Thinking distractable   Orientation person: oriented;place: oriented;date: oriented   Insight insight appropriate to situation   Judgement impaired   Eye Contact at examiner   Affect full range affect   Mood mood is calm   Physical Appearance/Attire disheveled;attire appropriate to age and situation   Hygiene neglected grooming - unclean body, hair, teeth;body odor   Suicidality (Denies)   1. Wish to be Dead No   2. Non-Specific Active Suicidal Thoughts  No   Self Injury (Denies, none observed)   Elopement (No statements made, none observed)   Activity other (see comment)  (Visible in the milieu, social with peers)   Speech clear;coherent   Psychomotor / Gait balanced;steady   Activities of Daily Living   Hygiene/Grooming prompts   Oral Hygiene prompts   Dress street clothes;independent   Laundry unable to complete   Room Organization independent     "

## 2019-01-11 NOTE — PROGRESS NOTES
Is discharged at this time accompanied by son and brother with belongings, medications and instructions.  Will need to stop by home pharmacy to  one medication.  Discharge instructions were reviewed with patient and family members.

## 2019-01-11 NOTE — PLAN OF CARE
"\"I'm less depressed and anxious than I was.  I think it's easier for me to be out of bed more.  I think ECT did help some.\"  Rates present anxiety level at a 3 on a scale with 10 the worst.  Rates anxiety as a 4 on the same scale.  Continues to deny suicidal ideation, states this was never a problem for her. Feels concentration and focus are improved.  Denies racing or ruminating thoughts or that this was a problem. Denies memory loss from ECT and none is noted. States sleep has improved.  Completed phone interview for [facility],  is very interested in participating in this.  Plans to continue to work with psychiatric NP for medication management.  Would be interesting in working with a therapist who specializes in OCD if this is recommended.  Declined ECT treatment for today.  Has expressed some anxiety regarding discharge, but states feels ready to go home.  Has been mostly in room this morning, coming to dining area for coffee and to talk on the phone.  Did not eat breakfast per her routine this morning.  "

## 2019-01-14 ENCOUNTER — TELEPHONE (OUTPATIENT)
Dept: FAMILY MEDICINE | Facility: CLINIC | Age: 54
End: 2019-01-14

## 2019-01-15 NOTE — TELEPHONE ENCOUNTER
ED / Discharge Outreach Protocol    Patient Contact    Attempt # 1    Was call answered?  No.  Left message on voicemail with information to call triage back.  FABIOLA LeighN, RN

## 2019-01-16 NOTE — TELEPHONE ENCOUNTER
ED / Discharge Outreach Protocol     -patient admitted to [facility] from 11/16 - 1/11   -Medication changes made, MTM referral placed by hospital provider   - No appointments scheduled in clinic at this time, per discharge AVS patient was planning to follow up with [facility], patient was supposed to check and see if she would accept new patients   - Patient is to be seen in 4 weeks for thyroid labs/medication monitoring     Patient Contact    Attempt # 2    Was call answered?  No.  Left message on voicemail with information to call me back.    Katie De, Registered Nurse   [facility]

## 2019-01-22 ENCOUNTER — NURSE TRIAGE (OUTPATIENT)
Dept: NURSING | Facility: CLINIC | Age: 54
End: 2019-01-22

## 2019-01-22 PROCEDURE — 99283 EMERGENCY DEPT VISIT LOW MDM: CPT | Mod: Z6 | Performed by: EMERGENCY MEDICINE

## 2019-01-22 PROCEDURE — 99283 EMERGENCY DEPT VISIT LOW MDM: CPT | Performed by: EMERGENCY MEDICINE

## 2019-01-23 ENCOUNTER — HOSPITAL ENCOUNTER (EMERGENCY)
Facility: CLINIC | Age: 54
Discharge: HOME | End: 2019-01-23
Attending: EMERGENCY MEDICINE | Admitting: EMERGENCY MEDICINE
Payer: COMMERCIAL

## 2019-01-23 VITALS
BODY MASS INDEX: 19.06 KG/M2 | HEIGHT: 62 IN | RESPIRATION RATE: 18 BRPM | DIASTOLIC BLOOD PRESSURE: 92 MMHG | TEMPERATURE: 98.1 F | HEART RATE: 91 BPM | SYSTOLIC BLOOD PRESSURE: 150 MMHG | OXYGEN SATURATION: 99 % | WEIGHT: 103.6 LBS

## 2019-01-23 DIAGNOSIS — B02.9 HERPES ZOSTER WITHOUT COMPLICATION: ICD-10-CM

## 2019-01-23 PROCEDURE — 25000132 ZZH RX MED GY IP 250 OP 250 PS 637: Performed by: EMERGENCY MEDICINE

## 2019-01-23 RX ORDER — TRAMADOL HYDROCHLORIDE 50 MG/1
50-100 TABLET ORAL EVERY 8 HOURS PRN
Qty: 15 TABLET | Refills: 0 | Status: SHIPPED | OUTPATIENT
Start: 2019-01-23 | End: 2019-06-06

## 2019-01-23 RX ORDER — VALACYCLOVIR HYDROCHLORIDE 1 G/1
1000 TABLET, FILM COATED ORAL 3 TIMES DAILY
Qty: 21 TABLET | Refills: 0 | Status: SHIPPED | OUTPATIENT
Start: 2019-01-23 | End: 2020-10-13

## 2019-01-23 RX ORDER — VALACYCLOVIR HYDROCHLORIDE 1 G/1
1000 TABLET, FILM COATED ORAL ONCE
Status: COMPLETED | OUTPATIENT
Start: 2019-01-23 | End: 2019-01-23

## 2019-01-23 RX ADMIN — VALACYCLOVIR HYDROCHLORIDE 1000 MG: 1 TABLET, FILM COATED ORAL at 00:58

## 2019-01-23 ASSESSMENT — MIFFLIN-ST. JEOR: SCORE: 1023.18

## 2019-01-23 NOTE — DISCHARGE INSTRUCTIONS
May use Tylenol or ibuprofen for mild pain.    Keep rash clean and dry.    Please make an appointment to follow up with Your Primary Care Provider and Primary Care Center (phone: [phone] in one week for recheck.    Return to the ER for fever.

## 2019-01-23 NOTE — ED PROVIDER NOTES
History     Chief Complaint   Patient presents with     Rash     L flank to back, denies new medications, since last Thursday     HPI  Gwen Cash is a 53 year old female who presents to the ER with complaints of a rash that started on her left lower quadrant of her abdomen anteriorly and now has become a confluent rash wrapping around her left flank.  Patient denies much pain with the rash but states it does feel slightly irritated and full.  Patient also admits to some generalized malaise but no fevers coughing or shortness of breath.  Patient denies any nausea or vomiting.  Patient denies any immunosuppression or oncologic history.    I have reviewed the Medications, Allergies, Past Medical and Surgical History, and Social History in the [facility] system.    Past Medical History:   Diagnosis Date     Calculus of right kidney 1997    had hydronephrosis & treated for mild corinna at that time, calcium phosphate stone s/p lithotripsy      Chronic depressive personality disorder      Depressive disorder      DUB (dysfunctional uterine bleeding) 2004    work up including tsh ,labs, ecc , gyn eval normal      H/O echocardiogram 11/2011    at health [facility] was normal , no valvular issues, done for functional benign murmur noted in past      History of Papanicolaou smear of cervix 9/14/01, 6/21/04    NIL     OCD (obsessive compulsive disorder) 3/4/2017     Panic disorder without agoraphobia 3/4/2017     Tobacco use disorder 3/4/2017       No current facility-administered medications on file prior to encounter.   Current Outpatient Medications on File Prior to Encounter:  clonazePAM (KLONOPIN) 0.5 MG tablet Take 1 tablet (0.5 mg) by mouth daily   levothyroxine (SYNTHROID/LEVOTHROID) 25 MCG tablet Take 1 tablet (25 mcg) by mouth every morning (before breakfast)   PARoxetine (PAXIL) 10 MG/5ML suspension Take 20 mLs (40 mg) by mouth daily   melatonin 3 MG tablet Take 1 tablet (3 mg) by mouth nightly as needed for sleep  "    Allergies   Allergen Reactions     Metronidazole Unknown     Abstracted data , patient cannot remember      No Clinical Screening - See Comments Hives     Penicillins Unknown     Abstracted data , patient cannot remember      Phenylbutazones      Social History     Socioeconomic History     Marital status:      Spouse name: Not on file     Number of children: Not on file     Years of education: Not on file     Highest education level: Not on file   Social Needs     Financial resource strain: Not on file     Food insecurity - worry: Not on file     Food insecurity - inability: Not on file     Transportation needs - medical: Not on file     Transportation needs - non-medical: Not on file   Occupational History     Not on file   Tobacco Use     Smoking status: Current Every Day Smoker     Packs/day: 0.50     Years: 30.00     Pack years: 15.00     Smokeless tobacco: Never Used   Substance and Sexual Activity     Alcohol use: No     Drug use: No     Sexual activity: No     Partners: Male   Other Topics Concern     Parent/sibling w/ CABG, MI or angioplasty before 65F 55M? Not Asked   Social History Narrative    Live with  and two of children     Has 3 kids ages 24, 22 and 19 as of 3/ 2017     Some cats at home    Not disabled    Not working currently      Past Surgical History:   Procedure Laterality Date      SECTION      x 3     LITHOTRIPSY       Family History   Problem Relation Age of Onset     Coronary Artery Disease Mother      Cataracts Mother      Alcoholism Father         alcohol cardiomyopathy     Alcoholism Brother      Arthritis Brother      Depression Brother      Coronary Artery Disease Brother      Hypertension Brother        Review of Systems   All other systems reviewed and are negative.      Physical Exam   BP: (!) 142/94  Pulse: 91  Temp: 98.1  F (36.7  C)  Resp: 18  Height: 157.5 cm (5' 2\")  Weight: 47 kg (103 lb 9.6 oz)  SpO2: 98 %      Physical Exam   Constitutional: She " is oriented to person, place, and time. She appears well-nourished. No distress.   HENT:   Head: Atraumatic.   Eyes: EOM are normal. Pupils are equal, round, and reactive to light.   Neck: Neck supple.   Cardiovascular: Regular rhythm.   Pulmonary/Chest: Breath sounds normal. She has no wheezes. She has no rales.   Musculoskeletal: She exhibits no deformity.   Neurological: She is alert and oriented to person, place, and time.   Grossly intact and symmetric   Skin:   Patient has a shingles rash with blisters around her left flank to her left lower quadrant of her abdomen in a dermatomal distribution.  There is no current evidence of superinfection   Psychiatric: She has a normal mood and affect.   Nursing note and vitals reviewed.      ED Course        Procedures              Assessments & Plan (with Medical Decision Making)     I have reviewed the nursing notes.    Patient presents with an obvious shingles rash and will be started on Valtrex.    Medications   valACYclovir (VALTREX) tablet 1,000 mg (not administered)   pending admin    I have reviewed the findings, diagnosis, plan and need for follow up with the patient.       Medication List      Started    traMADol 50 MG tablet  Commonly known as:  ULTRAM   mg, Oral, EVERY 8 HOURS PRN     valACYclovir 1000 mg tablet  Commonly known as:  VALTREX  1,000 mg, Oral, 3 TIMES DAILY            Final diagnoses:   Herpes zoster without complication - left flank     May use Tylenol or ibuprofen for mild pain.    Keep rash clean and dry.    Please make an appointment to follow up with Your Primary Care Provider and Primary Care Center (phone: [phone] in one week for recheck.    Return to the ER for fever.    Routine discharge instructions were given for this diagnosis.    Unruly Phillips MD    1/22/2019   [facility] EMERGENCY DEPARTMENT     Unruly Phillips MD  01/23/19 0058

## 2019-01-23 NOTE — TELEPHONE ENCOUNTER
"Caller: Jr, Brother and self  Reason for call: \"she has some sort of rash, it looks to me it could be shingles, can we come there to get it checked out or do we have to go to the ER?\".  Symptoms: \"bumpy\" rash- around back and around stomach (mostly left side), moderate itchy, moderate painful when touched  Symptoms started: > 5 days ago  Denies fever, new medicines, or new environmental items  Emergent symptoms reviewed. Care advice given per triage protocol; per triage guideline, advised caller to be seen by a provider within the next 24 hours and to let them know at check-in it could be shingles. Encouraged to take pics of the rash daily.  Caller verbalized understanding of care advice given and plans to be seen tonight (not sure if UC or ER). Caller had no further questions. Encouraged call back to [facility] 24/7 for nurse line services, new/worsening symptoms or further questions.    Sophia Montgomery RN  [facility]  (see bottom of encounter for care advice details)  Reason for Disposition    [1] Shingles rash AND [2] onset > 72 hours ago    Additional Information    Negative: Difficult to awaken or acting confused  (e.g., disoriented, slurred speech)    Negative: Sounds like a life-threatening emergency to the triager    Negative: [1] Localized rash AND [2] doesn't match the SYMPTOMS of shingles    Negative: [1] Back pain AND [2] doesn't match the SYMPTOMS of shingles    Negative: Patient sounds very sick or weak to the triager    Negative: [1] Shingles rash (matches SYMPTOMS) AND [2] weak immune system (e.g., HIV positive,  cancer chemotherapy, chronic steroid treatment, splenectomy) AND [3] NOT taking antiviral medication    Negative: Shingles rash on the eyelid or tip of the nose    Negative: [1] Shingles rash of face AND [2] eye pain or blurred vision    Negative: [1] Shingles rash of face AND [2] facial weakness    Negative: [1] Shingles rash of face or ear AND [2] earache or ringing in the ear    " Negative: [1] Shingles rash AND [2] spots start appearing other places on body    Negative: Fever > 100.5 F (38.1 C)    Negative: SEVERE pain (e.g., excruciating)    Negative: [1] Shingles rash (matches SYMPTOMS) AND [2] onset within past 72 hours    Protocols used: SHINGLES-ADULT-

## 2019-01-23 NOTE — ED AVS SNAPSHOT
[facility], [place], Emergency Department  [address]  [facility] [address]  Phone:  [phone]  Fax:  [phone]                                    Gwen Cash   MRN: [id number]    Department:  [facility], Emergency Department   Date of Visit:  1/22/2019           After Visit Summary Signature Page    I have received my discharge instructions, and my questions have been answered. I have discussed any challenges I see with this plan with the nurse or doctor.    ..........................................................................................................................................  Patient/Patient Representative Signature      ..........................................................................................................................................  Patient Representative Print Name and Relationship to Patient    ..................................................               ................................................  Date                                   Time    ..........................................................................................................................................  Reviewed by Signature/Title    ...................................................              ..............................................  Date                                               Time          22EPIC Rev 08/18

## 2019-01-27 NOTE — DISCHARGE SUMMARY
[facility], [place]   Psychiatric Discharge Summary      Gwen Cahs MRN# [id number]   Age: 53 year old YOB: 1965     Date of Admission:  11/16/2018  Date of Discharge:  01/11/19  Admitting Physician:  Wei Powell MD  Discharge Physician:  Wei Powell MD         Summary/Hospital Course/Disposition:   Reason for Hospitalization: Gwen is a 53 year old female with a history of depression, anxiety, OCD who is admitted on a voluntary basis for worsening depressive moods, lack of oral intake, and worsened obsessive/compulsive symptoms. The patient was brought in by her family for concerns of worsening depression, isolativeness, lack of oral intake, lack of volitional behavior, and excessive sleeping. The patient was seen in her room, was resting, but was mildly open to speak with me. She mentions that she has not been eating, due to lack of appetite, and has lost about 30 lbs in the past 4-5 months. She has been sleeping excessively, not caring for herself, spending excessive time checking/counting, and staying in her room. She denies any recent or current suicidal ideation, intent or plan, but does ruminate about dying and is fearful if she has some chronic illness. Collateral information from patient's family revealed that the patient spends most of her day on her chair, compulsively counting things, repetitively checking and getting worse in terms of her mood, thoughts and ability to care for self. The patient mentions that she has been taking Paxil and Klonopin since the 90s. Is open to some medication recommendations and also being seen by dietician and PA for her appetite/weight issues.       Hospital Course:   Patient had a rather long hospital stay that involved medication changes and other treatment modalities. She had precipitously worsened depression, anxiety and OCD which had lead to much impediment in her daily functioning. She initially, she did not  attend hospital groups, neglect her self care and was in her room mostly very tearful. Regarding medication changes, I had increased her Paxil to 40 mg, added on Wellbutrin 75 mg, N-acetylcysteine (for her OCD) 500 mg, and continued her PTA Klonopin 0.5 mg. Medcine consult had added on Synthroid 25 mcg daily for her hypothyroidism, which could also augment her antidepressants. Minor, if any benefit was seen with these changes. With much one on one discussion, along with family involvement/discussion, I had started ECT (procedure conducted by Dr. Wang). She had a total of 11 ECT sessions while in the hospital, which she overall tolerted. Her family continued to be involved with her care, and even helped facilitate her going to the shower, caring for her hygiene (they helped cut her fingernails). She had reported improved subjective mood - particularly lesser frequent depressive thoughts, and more energy. She was future oriented and willing to continue her medications and follow up appointments. She had improved in her depression to the point of not needing additional inpatient hospitalization, and the continued/longitudinal care plan will be what will help her condition going forward. A big positive factor is that she has a very big supportive family, which will be a positive psychosocial factor in her ongoing improvement.          DIagnoses:     Major Depressive Disorder, recurrent, severe  OCD  Subclinical Hypothyroidism          Labs:   No results found for this or any previous visit (from the past 24 hour(s)).         Consults:   Gwen Cash is a 53 year old year old woman with a history of OCD, anxiety, depression c/b 30lb weight loss due to insufficient calories who is admitted to station 10A for depression. Internal Medicine consultation was ordered by Dr. Wei Powell for general medical evaluation regarding abnormal labs and anorexia with low BMI 17.49.           OCD, anxiety, depression: Patient has a  long term hx of OCD, anxiety and depression. Reports excessive tiredness, sleeping, poor appetite. She was maintained on PTA Paxil and Klonipin.  -Management per psychiatry team.      Tobacco abuse: Patient smokes 1/2 ppd for 38years. Denies any chronic cough, sputum production, hemoptysis or SOB.   -Encouraged cessation   -Patient not ready at this time  -Consider nicotine patch prn   -Encouraged lung cancer screening at age 53yo      Severe malnutrition in context of chronic illness  Low BMI and weight loss 2/2 insufficient caloric intake: Pt reports a 30lb weight loss since January likely 2/2 insufficient caloric intake. She reports that she eats 1 meal per day. She reports a hx of only eating 1 meal per day, however, she reports each meal has gotten smaller. Regular diet includes a sandwich, banana, and milk or a [employer] burger, fries and soda. She notes that she does not have an appetite. Electrolyte including Mg, Phos without any acute abnormality. CBC negative for leukocytosis or anemia. Pt has been eating a small meal daily, therefore there is a lower risk for refeeding syndrome.   -Nutrition was consulted; appreciate recs   -Check daily BMP, Mg, Phos x3 days to assess for refeeding syndrome   -Boost plus with meals   -Recommend patient get outpatient screening colonoscopy and mammogram for  given weight loss and age to r/o malignant etiology      Elevated blood pressure: Pt initially hypertensive ~160/90 this AM. Repeat BP at 12:15 normalized to 119/68. Pt denies hx of HTN. She is currently asymptomatic.  -Will continue to trend BP  -Will hold antihypertensives at this time given normal BP  -However, If persistently elevated, will initiate BP medication     Subclinical hypothyroidism: Admission TSH 5.89, Free T4 0.93 indicating subclinical hypothyroidism. Given pt is depressed, recommended starting patient on Levothyroxine 25mcg per day. Pt was worried about starting the medication during our encounter,  however, is willing to take medication. She reports difficulty taking oral pills.   -Start Levothyroxine 25mcg oral suspension  -Pt to follow-up with PCP as an outpatient in 4wks for repeat TSH/T4 and for dose adjustment     Medicine will continue to follow, please page with any additional concerns.      Adrianne Ball PA-C  Hospitalist Service   Pager: [phone]           Discharge Medications:        Review of your medicines      START taking      Dose / Directions   levothyroxine 25 MCG tablet  Commonly known as:  SYNTHROID/LEVOTHROID  Used for:  Hypothyroidism, unspecified type      Dose:  25 mcg  Take 1 tablet (25 mcg) by mouth every morning (before breakfast)  Quantity:  30 tablet  Refills:  1     melatonin 3 MG tablet  Used for:  Major depressive disorder, remission status unspecified, unspecified whether recurrent      Dose:  3 mg  Take 1 tablet (3 mg) by mouth nightly as needed for sleep  Quantity:  30 tablet  Refills:  1     PARoxetine 10 MG/5ML suspension  Commonly known as:  PAXIL  Used for:  Major depressive disorder, remission status unspecified, unspecified whether recurrent  Replaces:  PARoxetine 20 MG tablet      Dose:  40 mg  Take 20 mLs (40 mg) by mouth daily  Quantity:  600 mL  Refills:  1        CONTINUE these medicines which have NOT CHANGED      Dose / Directions   clonazePAM 0.5 MG tablet  Commonly known as:  klonoPIN  Used for:  Panic disorder without agoraphobia      Dose:  0.5 mg  Take 1 tablet (0.5 mg) by mouth daily  Quantity:  30 tablet  Refills:  0        STOP taking    PARoxetine 20 MG tablet  Commonly known as:  PAXIL  Replaced by:  PARoxetine 10 MG/5ML suspension              Where to get your medicines      These medications were sent to [place], [place] - [address]  [address] [address]    Phone:  [phone]     levothyroxine 25 MCG tablet    melatonin 3 MG tablet     These medications were sent to [facility]  [facility]Store [id number] - [facility] [address] AT [facility] & [address] [place] [address] RAMYA BARNEY, [facility] [address]    Hours:  24-hours Phone:  [phone]     PARoxetine 10 MG/5ML suspension     Some of these will need a paper prescription and others can be bought over the counter. Ask your nurse if you have questions.    Bring a paper prescription for each of these medications    clonazePAM 0.5 MG tablet              Mental Status Examination:   Appearance:  Hospital attire, appropriate grooming (improved)   Attitude:  Cooperative, perseverative on her anxiety  Eye Contact:  fair  Mood: Anxious but improved presentation  Affect:  restricted- but shows more range   Speech:  coherent  Psychomotor Behavior:  No evidence of tardive dyskinesia, no tic, no tremor  Thought Process:  Perseverative.   Associations:  No loose association  Thought Content:  Has obsessive tendencies. Denies SI. No HI. No AH. No VH  Insight:  limited  Judgment: limited   Oriented to: time place person   Attention Span and Concentration: limited   Recent and Remote Memory:  fair  Language: able to name objects  Fund of Knowledge: normal  Muscle Strength and Tone: normal  Gait and Station: normal             Discharge Plan:        Medication Therapy Management Referral      Follow Up ([facility])    Follow up with primary care provider, Physician No Ref-Primary, in 4 weeks for repeat TFT's and synthroid dose adjustments as needed.      Appointments on [place] and/or [facility] (with [facility] or [facility] provider or service). Call [phone] if you haven't heard regarding these appointments within 7 days of discharge.       - Patient was discharged to her mother's house.   - Follow ups have been planned  - Patient was discharged with medications  - For details, please refer to \"hospital course\"    Wei Powell MD  [facility]    "

## 2019-03-20 ENCOUNTER — TELEPHONE (OUTPATIENT)
Dept: FAMILY MEDICINE | Facility: CLINIC | Age: 54
End: 2019-03-20

## 2019-03-20 NOTE — TELEPHONE ENCOUNTER
Reason for Call:  Exception to closed practice, looking to have Lisy Blum as primary.    Detailed comments: Patient's brother, Maged, called in to see if there was any way to have his sister added to Lisy Blum's roster of patients.  Maged has been a patient of Lisy's for awhile, said it would be incredibly convenient if Gwen could be seen by her as well - she hasn't had a check up in quite some time.  Phone Number Patient can be reached at: Maged Pollard - [phone]  Best Time: any    Can we leave a detailed message on this number? YES    Call taken on 3/20/2019 at 2:28 PM by Placido Kidd

## 2019-04-10 NOTE — TELEPHONE ENCOUNTER
Jr Hernández, Pt's brother, called in regards to Amelia's msg. He said she had suggested a different provider but he doesn't remember who it was, and is following up on who to schedule with

## 2019-06-06 ENCOUNTER — OFFICE VISIT (OUTPATIENT)
Dept: FAMILY MEDICINE | Facility: CLINIC | Age: 54
End: 2019-06-06
Payer: COMMERCIAL

## 2019-06-06 VITALS
RESPIRATION RATE: 16 BRPM | SYSTOLIC BLOOD PRESSURE: 147 MMHG | DIASTOLIC BLOOD PRESSURE: 87 MMHG | BODY MASS INDEX: 18.39 KG/M2 | WEIGHT: 103.8 LBS | HEART RATE: 99 BPM | OXYGEN SATURATION: 98 % | HEIGHT: 63 IN | TEMPERATURE: 98.5 F

## 2019-06-06 DIAGNOSIS — E03.9 HYPOTHYROIDISM, UNSPECIFIED TYPE: ICD-10-CM

## 2019-06-06 DIAGNOSIS — F33.2 SEVERE RECURRENT MAJOR DEPRESSION WITHOUT PSYCHOTIC FEATURES (H): Primary | ICD-10-CM

## 2019-06-06 DIAGNOSIS — Z98.891 HISTORY OF 3 CESAREAN SECTIONS: ICD-10-CM

## 2019-06-06 DIAGNOSIS — F42.8 OTHER OBSESSIVE-COMPULSIVE DISORDERS: ICD-10-CM

## 2019-06-06 DIAGNOSIS — N20.0 NEPHROLITHIASIS: ICD-10-CM

## 2019-06-06 RX ORDER — CLONAZEPAM 0.5 MG/1
TABLET ORAL
COMMUNITY
Start: 2019-06-03 | End: 2019-06-06

## 2019-06-06 RX ORDER — LEVOTHYROXINE SODIUM 25 UG/1
25 TABLET ORAL
Qty: 90 TABLET | Refills: 1 | Status: SHIPPED | OUTPATIENT
Start: 2019-06-06 | End: 2019-12-03

## 2019-06-06 RX ORDER — BUPROPION HYDROCHLORIDE 100 MG/1
TABLET ORAL
COMMUNITY
Start: 2019-01-10 | End: 2020-10-13

## 2019-06-06 ASSESSMENT — MIFFLIN-ST. JEOR: SCORE: 1027.02

## 2019-06-06 ASSESSMENT — ANXIETY QUESTIONNAIRES
1. FEELING NERVOUS, ANXIOUS, OR ON EDGE: NEARLY EVERY DAY
7. FEELING AFRAID AS IF SOMETHING AWFUL MIGHT HAPPEN: SEVERAL DAYS
5. BEING SO RESTLESS THAT IT IS HARD TO SIT STILL: NOT AT ALL
IF YOU CHECKED OFF ANY PROBLEMS ON THIS QUESTIONNAIRE, HOW DIFFICULT HAVE THESE PROBLEMS MADE IT FOR YOU TO DO YOUR WORK, TAKE CARE OF THINGS AT HOME, OR GET ALONG WITH OTHER PEOPLE: VERY DIFFICULT
6. BECOMING EASILY ANNOYED OR IRRITABLE: NOT AT ALL
3. WORRYING TOO MUCH ABOUT DIFFERENT THINGS: NEARLY EVERY DAY
2. NOT BEING ABLE TO STOP OR CONTROL WORRYING: NEARLY EVERY DAY
GAD7 TOTAL SCORE: 13

## 2019-06-06 ASSESSMENT — PATIENT HEALTH QUESTIONNAIRE - PHQ9
SUM OF ALL RESPONSES TO PHQ QUESTIONS 1-9: 9
5. POOR APPETITE OR OVEREATING: NEARLY EVERY DAY

## 2019-06-06 NOTE — PATIENT INSTRUCTIONS
Here is the plan from today's visit    1. Hypothyroidism, unspecified type  - levothyroxine (SYNTHROID/LEVOTHROID) 25 MCG tablet; Take 1 tablet (25 mcg) by mouth every morning (before breakfast)  Dispense: 90 tablet; Refill: 1        Please call or return to clinic if your symptoms don't go away.    Follow up plan  Come in for physical and thyroid follow up     Thank you for coming to [facility] today.  Lab Testing:  **If you had lab testing today and your results are reassuring or normal they will be mailed to you or sent through [employer] within 7 days.   **If the lab tests need quick action we will call you with the results.  The phone number we will call with results is # [phone] (home) . If this is not the best number please call our clinic and change the number.  Medication Refills:  If you need any refills please call your pharmacy and they will contact us.   If you need to  your refill at a new pharmacy, please contact the new pharmacy directly. The new pharmacy will help you get your medications transferred faster.   Scheduling:  If you have any concerns about today's visit or wish to schedule another appointment please call our office during normal business hours [phone] (8-5:00 M-F)  If a referral was made to a [facility] and you don't get a call from central scheduling please call [phone].  If a Mammogram was ordered for you at [facility] call [phone] to schedule or change your appointment.  If you had an XRay/CT/Ultrasound/MRI ordered the number is [phone] to schedule or change your radiology appointment.   Medical Concerns:  If you have urgent medical concerns please call [phone] at any time of the day.    Cindy Bean MD

## 2019-06-06 NOTE — PROGRESS NOTES
JB Hidalgo is a 54 year old  female  who presents:    Chief Complaint   Patient presents with     hospitals Care     f/u hospital and medications- needs to have thyroid checked and medications renewed     MDD with anxiety:  11/16 - 1/11 was hospitalized for MDD (Stopped eating and drinking and had lost 30 pounds -found by her sister holled up at home).  Admitted to [facility].   Tried meds and ended up starting her on ECT.  She now has decided that she does not want to do that. Noted to have mildly high TSH and started on synthroid with nl repeat. Ran out in [facility].   Is seeing Mercedes Mcqueen - is taking care of the Klonopin and Paxil.     Blood pressure perfect in the hospital but high in the ED. Very worried about whether should treat but she is also very reluctant to start meds.     S/post ED visit 1/23/2019 for shingles.     PMHx: trauma about 1 year ago.  Hives with generic paxil    ROS: full 12 point done and all negative except for the following:   Has Shingles   Needing reading glasses  Has been losing weight   Does get hot flashes.   Has not had vaginal bleeding for a few years.      Shx: smoking. Denies substance use. Not currently in a relationship.   Here with her sister (Malena Brown). Malena is acting as her .  FHx: a lot of cardiac, htn, and alcohol use  Patient Active Problem List   Diagnosis     Panic disorder without agoraphobia     Tobacco use disorder     Chronic depressive personality disorder     Depression     Severe recurrent major depression without psychotic features (H)       Current Outpatient Medications   Medication Sig Dispense Refill     clonazePAM (KLONOPIN) 0.5 MG tablet        PAXIL 40 MG tablet        buPROPion (WELLBUTRIN) 100 MG tablet        clonazePAM (KLONOPIN) 0.5 MG tablet Take 1 tablet (0.5 mg) by mouth daily 30 tablet 0     levothyroxine (SYNTHROID/LEVOTHROID) 25 MCG tablet Take 1 tablet (25 mcg) by mouth every morning (before breakfast) 30 tablet 1      "PARoxetine (PAXIL) 10 MG/5ML suspension Take 20 mLs (40 mg) by mouth daily 600 mL 1     valACYclovir (VALTREX) 1000 mg tablet Take 1 tablet (1,000 mg) by mouth 3 times daily for 7 days 21 tablet 0          Allergies   Allergen Reactions     Metronidazole Unknown     Abstracted data , patient cannot remember      Penicillins Unknown     Abstracted data , patient cannot remember                 Review of Systems:   See above. 12 point neg except for the HPI            Physical Exam:     Vitals:    06/06/19 1541 06/06/19 1550   BP: 166/79 157/87   BP Location: Left arm Left arm   Patient Position: Sitting Sitting   Cuff Size: Adult Regular Adult Regular   Pulse: 99    Resp: 16    Temp: 98.5  F (36.9  C)    TempSrc: Oral    SpO2: 98%    Weight: 47.1 kg (103 lb 12.8 oz)    Height: 1.588 m (5' 2.5\")      Body mass index is 18.68 kg/m .  Vitals were reviewed and were normal  Vital signs normal except BP  GENERAL: healthy, alert, well nourished, well hydrated, no distress  HENT: ear canals- normal; TMs- normal; Nose- normal; Mouth- no ulcers, no lesions  NECK: no tenderness, no adenopathy, no asymmetry, no masses, no stiffness; thyroid- normal to palpation  RESP: lungs clear to auscultation - no rales, no rhonchi, no wheezes  CV: regular rates and rhythm, normal S1 S2, no S3 or S4 and no murmur, no click or rub -  ABDOMEN: soft, no tenderness, no  hepatosplenomegaly, no masses, normal bowel sounds  MS: extremities- no gross deformities noted, no edema  PSYCH: very anxious, tangential speech, somewhat discheveled, neg SI.     Admission on 11/16/2018, Discharged on 01/11/2019   Component Date Value Ref Range Status     WBC 11/16/2018 7.8  4.0 - 11.0 10e9/L Final     RBC Count 11/16/2018 5.19  3.8 - 5.2 10e12/L Final     Hemoglobin 11/16/2018 15.9* 11.7 - 15.7 g/dL Final     Hematocrit 11/16/2018 46.3  35.0 - 47.0 % Final     MCV 11/16/2018 89  78 - 100 fl Final     MCH 11/16/2018 30.6  26.5 - 33.0 pg Final     Health system 11/16/2018 " 34.3  31.5 - 36.5 g/dL Final     RDW 11/16/2018 12.6  10.0 - 15.0 % Final     Platelet Count 11/16/2018 338  150 - 450 10e9/L Final     Diff Method 11/16/2018 Automated Method   Final     % Neutrophils 11/16/2018 52.8  % Final     % Lymphocytes 11/16/2018 33.1  % Final     % Monocytes 11/16/2018 11.0  % Final     % Eosinophils 11/16/2018 1.7  % Final     % Basophils 11/16/2018 1.3  % Final     % Immature Granulocytes 11/16/2018 0.1  % Final     Nucleated RBCs 11/16/2018 0  0 /100 Final     Absolute Neutrophil 11/16/2018 4.1  1.6 - 8.3 10e9/L Final     Absolute Lymphocytes 11/16/2018 2.6  0.8 - 5.3 10e9/L Final     Absolute Monocytes 11/16/2018 0.9  0.0 - 1.3 10e9/L Final     Absolute Eosinophils 11/16/2018 0.1  0.0 - 0.7 10e9/L Final     Absolute Basophils 11/16/2018 0.1  0.0 - 0.2 10e9/L Final     Abs Immature Granulocytes 11/16/2018 0.0  0 - 0.4 10e9/L Final     Absolute Nucleated RBC 11/16/2018 0.0   Final     Sodium 11/16/2018 141  133 - 144 mmol/L Final     Potassium 11/16/2018 3.8  3.4 - 5.3 mmol/L Final     Chloride 11/16/2018 103  94 - 109 mmol/L Final     Carbon Dioxide 11/16/2018 29  20 - 32 mmol/L Final     Anion Gap 11/16/2018 9  3 - 14 mmol/L Final     Glucose 11/16/2018 103* 70 - 99 mg/dL Final     Urea Nitrogen 11/16/2018 6* 7 - 30 mg/dL Final     Creatinine 11/16/2018 0.72  0.52 - 1.04 mg/dL Final     GFR Estimate 11/16/2018 84  >60 mL/min/1.7m2 Final    Non  GFR Calc     GFR Estimate If Black 11/16/2018 >90  >60 mL/min/1.7m2 Final    African American GFR Calc     Calcium 11/16/2018 9.5  8.5 - 10.1 mg/dL Final     Bilirubin Total 11/16/2018 0.4  0.2 - 1.3 mg/dL Final     Albumin 11/16/2018 4.9  3.4 - 5.0 g/dL Final     Protein Total 11/16/2018 8.3  6.8 - 8.8 g/dL Final     Alkaline Phosphatase 11/16/2018 74  40 - 150 U/L Final     ALT 11/16/2018 17  0 - 50 U/L Final     AST 11/16/2018 11  0 - 45 U/L Final     TSH 11/16/2018 5.89* 0.40 - 4.00 mU/L Final     HCG Qual Urine 11/16/2018  Negative  NEG^Negative Final    Comment: This test is for screening purposes.  Results should be interpreted along with   the clinical picture.  Confirmation testing is available if warranted by   ordering FGU430, HCG Quantitative Pregnancy.       Magnesium 11/16/2018 2.4* 1.6 - 2.3 mg/dL Final     Phosphorus 11/16/2018 3.5  2.5 - 4.5 mg/dL Final     Color Urine 11/16/2018 Light Yellow   Final     Appearance Urine 11/16/2018 Clear   Final     Glucose Urine 11/16/2018 Negative  NEG^Negative mg/dL Final     Bilirubin Urine 11/16/2018 Negative  NEG^Negative Final     Ketones Urine 11/16/2018 Negative  NEG^Negative mg/dL Final     Specific Gravity Urine 11/16/2018 1.004  1.003 - 1.035 Final     Blood Urine 11/16/2018 Negative  NEG^Negative Final     pH Urine 11/16/2018 6.5  5.0 - 7.0 pH Final     Protein Albumin Urine 11/16/2018 Negative  NEG^Negative mg/dL Final     Urobilinogen mg/dL 11/16/2018 Normal  0.0 - 2.0 mg/dL Final     Nitrite Urine 11/16/2018 Negative  NEG^Negative Final     Leukocyte Esterase Urine 11/16/2018 Negative  NEG^Negative Final     Source 11/16/2018 Midstream Urine   Final     T4 Free 11/16/2018 0.93  0.76 - 1.46 ng/dL Final     Sodium 11/17/2018 141  133 - 144 mmol/L Final     Potassium 11/17/2018 4.4  3.4 - 5.3 mmol/L Final     Chloride 11/17/2018 103  94 - 109 mmol/L Final     Carbon Dioxide 11/17/2018 32  20 - 32 mmol/L Final     Anion Gap 11/17/2018 6  3 - 14 mmol/L Final     Glucose 11/17/2018 91  70 - 99 mg/dL Final     Urea Nitrogen 11/17/2018 12  7 - 30 mg/dL Final     Creatinine 11/17/2018 0.94  0.52 - 1.04 mg/dL Final     GFR Estimate 11/17/2018 62  >60 mL/min/1.7m2 Final    Non  GFR Calc     GFR Estimate If Black 11/17/2018 75  >60 mL/min/1.7m2 Final    African American GFR Calc     Calcium 11/17/2018 9.2  8.5 - 10.1 mg/dL Final     Magnesium 11/17/2018 2.7* 1.6 - 2.3 mg/dL Final     Phosphorus 11/17/2018 4.0  2.5 - 4.5 mg/dL Final     Sodium 11/18/2018 140  133 - 144  mmol/L Final     Potassium 11/18/2018 4.1  3.4 - 5.3 mmol/L Final     Chloride 11/18/2018 106  94 - 109 mmol/L Final     Carbon Dioxide 11/18/2018 30  20 - 32 mmol/L Final     Anion Gap 11/18/2018 4  3 - 14 mmol/L Final     Glucose 11/18/2018 92  70 - 99 mg/dL Final     Urea Nitrogen 11/18/2018 14  7 - 30 mg/dL Final     Creatinine 11/18/2018 0.81  0.52 - 1.04 mg/dL Final     GFR Estimate 11/18/2018 74  >60 mL/min/1.7m2 Final    Non  GFR Calc     GFR Estimate If Black 11/18/2018 89  >60 mL/min/1.7m2 Final    African American GFR Calc     Calcium 11/18/2018 8.8  8.5 - 10.1 mg/dL Final     Magnesium 11/18/2018 2.5* 1.6 - 2.3 mg/dL Final     Phosphorus 11/18/2018 4.0  2.5 - 4.5 mg/dL Final     Sodium 11/19/2018 141  133 - 144 mmol/L Final     Potassium 11/19/2018 4.0  3.4 - 5.3 mmol/L Final     Chloride 11/19/2018 106  94 - 109 mmol/L Final     Carbon Dioxide 11/19/2018 30  20 - 32 mmol/L Final     Anion Gap 11/19/2018 5  3 - 14 mmol/L Final     Glucose 11/19/2018 83  70 - 99 mg/dL Final     Urea Nitrogen 11/19/2018 14  7 - 30 mg/dL Final     Creatinine 11/19/2018 0.74  0.52 - 1.04 mg/dL Final     GFR Estimate 11/19/2018 82  >60 mL/min/1.7m2 Final    Non  GFR Calc     GFR Estimate If Black 11/19/2018 >90  >60 mL/min/1.7m2 Final    African American GFR Calc     Calcium 11/19/2018 8.9  8.5 - 10.1 mg/dL Final     Magnesium 11/19/2018 2.5* 1.6 - 2.3 mg/dL Final     Phosphorus 11/19/2018 3.1  2.5 - 4.5 mg/dL Final     Interpretation ECG 11/29/2018 Click View Image link to view waveform and result   Final     Sodium 11/29/2018 137  133 - 144 mmol/L Final     Potassium 11/29/2018 4.4  3.4 - 5.3 mmol/L Final     Chloride 11/29/2018 102  94 - 109 mmol/L Final     Carbon Dioxide 11/29/2018 30  20 - 32 mmol/L Final     Anion Gap 11/29/2018 5  3 - 14 mmol/L Final     Glucose 11/29/2018 90  70 - 99 mg/dL Final     Urea Nitrogen 11/29/2018 20  7 - 30 mg/dL Final     Creatinine 11/29/2018 0.74  0.52 -  1.04 mg/dL Final     GFR Estimate 11/29/2018 81  >60 mL/min/1.7m2 Final    Non  GFR Calc     GFR Estimate If Black 11/29/2018 >90  >60 mL/min/1.7m2 Final    African American GFR Calc     Calcium 11/29/2018 9.6  8.5 - 10.1 mg/dL Final     Magnesium 11/29/2018 2.2  1.6 - 2.3 mg/dL Final     Phosphorus 11/29/2018 3.4  2.5 - 4.5 mg/dL Final     Hemoglobin 01/03/2019 12.7  11.7 - 15.7 g/dL Final     Sodium 01/03/2019 141  133 - 144 mmol/L Final     Potassium 01/03/2019 4.5  3.4 - 5.3 mmol/L Final     Chloride 01/03/2019 105  94 - 109 mmol/L Final     Carbon Dioxide 01/03/2019 28  20 - 32 mmol/L Final     Anion Gap 01/03/2019 8  3 - 14 mmol/L Final     Glucose 01/03/2019 82  70 - 99 mg/dL Final     Urea Nitrogen 01/03/2019 20  7 - 30 mg/dL Final     Creatinine 01/03/2019 0.78  0.52 - 1.04 mg/dL Final     GFR Estimate 01/03/2019 85  >60 mL/min/[1.73_m2] Final    Comment: Non  GFR Calc  Starting 12/18/2018, serum creatinine based estimated GFR (eGFR) will be   calculated using the Chronic Kidney Disease Epidemiology Collaboration   (CKD-EPI) equation.       GFR Estimate If Black 01/03/2019 >90  >60 mL/min/[1.73_m2] Final    Comment:  GFR Calc  Starting 12/18/2018, serum creatinine based estimated GFR (eGFR) will be   calculated using the Chronic Kidney Disease Epidemiology Collaboration   (CKD-EPI) equation.       Calcium 01/03/2019 8.9  8.5 - 10.1 mg/dL Final     TSH 01/03/2019 0.53  0.40 - 4.00 mU/L Final         Assessment and Plan     Gwen was seen today for establish care.    Diagnoses and all orders for this visit:    Severe recurrent major depression without psychotic features (H) - recently on ECT, now not. On minimal meds and being prescribed by psychiatry. Very anxious and asked questions repeatedly.      Hypothyroidism, unspecified type - reviewed what this is and that we could check her labs or just continue. Plan is to continue because she reports feeling better  on the synthroid.  Recheck in 2 month.   -     levothyroxine (SYNTHROID/LEVOTHROID) 25 MCG tablet; Take 1 tablet (25 mcg) by mouth every morning (before breakfast)    Nephrolithiasis  - PMHx    History of 3  sections    Other obsessive-compulsive disorders - we did not explore these.     Elevated BP  - she notes having nl BPs in the hospital. So likely a stress response. She was very worried about her BP. Plan is for her to return in 2 months for repeat BP and if high, could do ambulatory BPs. Not sure that she would benefit from having a cuff at home since she might over test.      See me in 2 months for preventive exam. All visits need to be 40 min.     Medications Discontinued During This Encounter   Medication Reason     clonazePAM (KLONOPIN) 0.5 MG tablet      PARoxetine (PAXIL) 10 MG/5ML suspension      traMADol (ULTRAM) 50 MG tablet      levothyroxine (SYNTHROID/LEVOTHROID) 25 MCG tablet Reorder       Options for treatment and follow-up care were reviewed with the patient . Gwen Cash  engaged in the decision making process and verbalized understanding of the options discussed and agreed with the final plan.    Cindy Bean MD

## 2019-06-07 ASSESSMENT — ANXIETY QUESTIONNAIRES: GAD7 TOTAL SCORE: 13

## 2019-08-15 NOTE — OR NURSING
"Subjective:       Chief Complaint   Patient presents with   • Stye     x 3 days, R eye                Eye Problem   The right eye is affected. This is a new problem. The current episode started in the past 3 days. The problem occurs constantly. The problem has been gradually worsening. There was no injury mechanism. The pain is mild. There is no known exposure to pink eye.  he does not wear contacts. Associated symptoms include eye redness (upper lid). Pertinent negatives include no blurred vision, eye discharge, double vision, fever, itching or photophobia. Pt has tried nothing for the symptoms.     Past Medical History:   Diagnosis Date   • Anxiety    • Arthritis    • Depression    • Heart burn    • Indigestion    • Pain     right hip   • Pain 5/10/12    left hip   • Suicide attempt (HCC)    • Unspecified disorder of thyroid     hypothyroid     Social History     Tobacco Use   • Smoking status: Never Smoker   • Smokeless tobacco: Never Used   Substance Use Topics   • Alcohol use: No     Alcohol/week: 0.0 oz   • Drug use: No      Family hx was reviewed - no pertinent past family hx    Review of Systems   Constitutional: Negative for fever.   Eyes: Positive for redness (upper lid). Negative for blurred vision, double vision, photophobia and discharge.   Skin: Negative for itching.   All other systems reviewed and are negative.         Objective:     /68   Pulse 69   Temp 36.7 °C (98 °F) (Temporal)   Resp 16   Ht 1.803 m (5' 11\")   SpO2 97%     Physical Exam   Constitutional: pt is oriented to person, place, and time. Pt appears well-developed and well-nourished. No distress.   HENT:   Head: Normocephalic and atraumatic.   Eyes: Conjunctivae and EOM are normal. Pupils are equal, round, and reactive to light. Right conjunctiva is not injected. Left conjunctiva is not injected.       Tender, erythematous nodule, right upper eyelid, with some surrounding erythema, but no discharge.      Cardiovascular: Normal " Patient adequate for discharge. Report called to inpatient RNPema. VSS, A/O, IV removed. Discharged with staff at this time.    rate.    Pulmonary/Chest: Effort normal.   Neurological: pt is alert and oriented to person, place, and time.  no cranial nerve deficit  Skin: Skin is warm. Pt is not diaphoretic. No erythema.   Nursing note and vitals reviewed.              Assessment/Plan:       1. Hordeolum externum of right upper eyelid     - polymixin-trimethoprim (POLYTRIM) 51547-8.1 UNIT/ML-% Solution; Place 1 Drop in right eye 4 times a day.  Dispense: 1 Bottle; Refill: 0  - sulfamethoxazole-trimethoprim (BACTRIM DS) 800-160 MG tablet; Take 1 Tab by mouth 2 times a day for 7 days.  Dispense: 14 Tab; Refill: 0     Follow up in one week if no improvement, sooner if symptoms worsen.

## 2019-12-03 DIAGNOSIS — E03.9 HYPOTHYROIDISM, UNSPECIFIED TYPE: ICD-10-CM

## 2019-12-03 RX ORDER — LEVOTHYROXINE SODIUM 25 UG/1
25 TABLET ORAL
Qty: 90 TABLET | Refills: 0 | Status: SHIPPED | OUTPATIENT
Start: 2019-12-03 | End: 2020-03-19

## 2019-12-03 NOTE — LETTER
December 3, 2019      Gwen Cash  [address]  [facility] [address]        Dear Gwen,    I recently refilled your synthroid medication. This is a reminder that I need to see you back in January, so we can repeat your thyroid test at that time.  I also want to make sure we are paying attention to your blood pressure which was up the last visit.  So, hope to see you in a month or so.    Sincerely,    Cindy Bean MD

## 2019-12-03 NOTE — TELEPHONE ENCOUNTER

## 2019-12-27 ENCOUNTER — TELEPHONE (OUTPATIENT)
Dept: FAMILY MEDICINE | Facility: CLINIC | Age: 54
End: 2019-12-27

## 2019-12-27 NOTE — LETTER
[facility]   [address] [place] [address]  [phone]        Gwen Lewis Shreya                                                               Date: 12/27/2019  [address]  [facility] [address]           Joe Hidalgo,     We are committed to making sure our patients receive the best quality care, including preventative care.  Part of that commitment includes making sure you are getting your screening tests such as mammograms, colonoscopies, and pap smears on time.  We have noted that you are currently due for the following:      Health Maintenance Due   Topic Date Due     PREVENTIVE CARE VISIT  1965     HEPATITIS C SCREENING  1965     ADVANCE CARE PLANNING  1965     DEPRESSION ACTION PLAN  1965     MAMMO SCREENING  1965     COLONOSCOPY  03/03/1974     DTAP/TDAP/TD IMMUNIZATION (2 - Tdap) 03/03/1975     HIV SCREENING  03/03/1979     PNEUMOCOCCAL IMMUNIZATION 19-64 MEDIUM RISK (1 of 1 - PPSV23) 03/03/1983     HPV  03/03/1985     PAP  03/03/1989     LIPID  03/03/2009     ZOSTER IMMUNIZATION (1 of 2) 03/03/2014     INFLUENZA VACCINE (1) 09/01/2019     PHQ-9  12/06/2019        The Care Team recommends that you please call the clinic at your earliest convenience to schedule an appointment for an Office Visit/Physical. If you have already completed any of the tests listed, please contact us through [employer] or call [phone] so we can update our records.  In addition to the test(s) name, you will be asked to provide the date, location and result.     Please note that if you have not seen your provider in 12 months or longer, a visit will be required before any refills of your medications can be authorized.     We do understand that you may have already discussed some this with your provider.  However, [facility] -  has an additional Healthcare Team specifically designed to help you complete your regular health maintenance and  screening tests.  We apologize in advance for any duplicate messages you may receive and hope you understand that our primary goal is your health and well-being.     Thank you for trusting us with your health care,       Your [facility] Team

## 2019-12-27 NOTE — TELEPHONE ENCOUNTER
Panel Management Review      Patient has the following on her problem list:     Depression / Dysthymia review    Measure:  Needs PHQ-9 score of 4 or less during index window.  Administer PHQ-9 and if score is 5 or more, send encounter to provider for next steps.    5 - 7 month window range: 6 months     PHQ-9 SCORE 3/3/2017 6/6/2019   PHQ-9 Total Score 0 9       If PHQ-9 recheck is 5 or more, route to provider for next steps.    Patient is due for:  PHQ9 and DAP      Composite cancer screening  Chart review shows that this patient is due/due soon for the following Pap Smear, Mammogram and Colonoscopy  Summary:    Patient is due/failing the following:   Flu shot, COLONOSCOPY, DAP, MAMMOGRAM, PAP, PHQ9 and PHYSICAL    Action needed:   Patient needs office visit for Flu shot, COLONOSCOPY, DAP, MAMMOGRAM, PAP, PHQ9 and PHYSICAL.    Type of outreach:    Sent letter.    Questions for provider review:    None                                                                                                                                    Fanta Small MA       Chart routed to Care Team .

## 2020-03-18 DIAGNOSIS — E03.9 HYPOTHYROIDISM, UNSPECIFIED TYPE: ICD-10-CM

## 2020-03-19 RX ORDER — LEVOTHYROXINE SODIUM 25 UG/1
25 TABLET ORAL
Qty: 90 TABLET | Refills: 0 | Status: SHIPPED | OUTPATIENT
Start: 2020-03-19 | End: 2020-06-30

## 2020-06-30 DIAGNOSIS — E03.9 HYPOTHYROIDISM, UNSPECIFIED TYPE: ICD-10-CM

## 2020-06-30 RX ORDER — LEVOTHYROXINE SODIUM 25 UG/1
25 TABLET ORAL
Qty: 90 TABLET | Refills: 0 | Status: SHIPPED | OUTPATIENT
Start: 2020-06-30 | End: 2020-10-12

## 2020-06-30 NOTE — TELEPHONE ENCOUNTER
Verify that the refill encounter hasn't been started Yes    [facility] phone call message- patient requesting a refill:    Full Medication Name: levothyroxine (SYNTHROID/LEVOTHROID) 25 MCG tablet     Dose: Take 1 tablet (25 mcg) by mouth every morning (before breakfast) - Oral      Pharmacy confirmed as    [id number] - [place], [place] - [address] AT [facility] [address] (aka [facility] [address] [facility]) [phone]  : Yes    Medication tab checked to see if medication has been sent  Yes    Additional Comments:      OK to leave a message on voice mail? Yes    Advised patient refill may take up to 2 business days? Yes    Primary language: English      needed? No    Call taken on June 30, 2020 at 2:57 PM by Caroline Mathew    Route to  [facility] REFILL

## 2020-06-30 NOTE — TELEPHONE ENCOUNTER
Sending 3 months per standing orders during COVID, will route to PCP to see if you would like to order some lab work and advise when you would like to see her again virtually.  Chelsea Gao RN

## 2020-07-01 NOTE — TELEPHONE ENCOUNTER
I would love to see her virtually at this time. Been a year. If she would like to come in, we can do that as well since she does need labs. If she plans to come in virtually, then I will put in lab only orders.

## 2020-07-03 NOTE — TELEPHONE ENCOUNTER
7/3/20 Attempted to reach patient to schedule appointment with . Left a message on patient voicemail. Will try again.    Edith Silva  Care Coordinator

## 2020-07-06 NOTE — TELEPHONE ENCOUNTER
7/6/20 Unable to reach patient by telephone x2. Sending letter to patient home.    Edith Silva  Care Coordinator

## 2020-10-12 DIAGNOSIS — E03.9 HYPOTHYROIDISM, UNSPECIFIED TYPE: ICD-10-CM

## 2020-10-12 RX ORDER — LEVOTHYROXINE SODIUM 25 UG/1
25 TABLET ORAL
Qty: 90 TABLET | Refills: 0 | Status: SHIPPED | OUTPATIENT
Start: 2020-10-12 | End: 2020-10-13

## 2020-10-12 NOTE — LETTER
October 14, 2020      Gwen Cash  [address] [facility] [address]        Dear ,    Thank you for receiving your care at [facility]. After several attempts we have been unable to reach you by phone. Dr. Bean has approved a refill of your levothyroxine but we do need to see you in clinic for future medication refills.    Please call the clinic at [phone] to schedule.    Thank you!     Chica Peterson RN

## 2020-10-12 NOTE — TELEPHONE ENCOUNTER
Verify that the refill encounter hasn't been started Yes    [facility] phone call message- patient requesting a refill:    Full Medication Name: levothyroxine (SYNTHROID/LEVOTHROID) 25 MCG tablet    Dose: see chart     Pharmacy confirmed as   [id number] - [place], [place] - [address] AT [facility] [address]  : Yes    Medication tab checked to see if medication has been sent  Yes    Additional Comments: Patient will be out of med after tomorrow. If office visit needed, patient does not want to come in to the clinic due to COVID-19. Likely would not be able to do video, but could probably do a telephone visit.    Okay to speak with sister Malena Brown; mobile [phone]. Home number [phone].    OK to leave a message on voice mail? Yes    Advised patient refill may take up to 2 business days? Yes    Primary language: English      needed? No    Call taken on October 12, 2020 at 4:18 PM by Betty Jones    Route to [facility] REFILL

## 2020-10-12 NOTE — TELEPHONE ENCOUNTER
"Patient requesting refill of synthroid. Last office visit 6/6/19 with Dr. Bean. RN unable to fill as patient hasn't been seen in the last year. Routing to PCP high priority to order if appropriate.   Janey Stoner RN      Request for medication refill:    Providers if patient needs an appointment and you are willing to give a one month supply please refill for one month and  send a letter/MyChart using \".SMILLIMITEDREFILL\" .smillimited and route chart to \"P SMI \" (Giving one month refill in non controlled medications is strongly recommended before denial)    If refill has been denied, meaning absolutely no refills without visit, please complete the smart phrase \".smirxrefuse\" and route it to the \"P [facility] MED REFILLS\"  pool to inform the patient and the pharmacy.    Janey Stoner RN        "

## 2020-10-13 ENCOUNTER — VIRTUAL VISIT (OUTPATIENT)
Dept: FAMILY MEDICINE | Facility: CLINIC | Age: 55
End: 2020-10-13
Payer: COMMERCIAL

## 2020-10-13 DIAGNOSIS — E03.9 HYPOTHYROIDISM, UNSPECIFIED TYPE: ICD-10-CM

## 2020-10-13 PROCEDURE — 99213 OFFICE O/P EST LOW 20 MIN: CPT | Mod: TEL | Performed by: FAMILY MEDICINE

## 2020-10-13 RX ORDER — LEVOTHYROXINE SODIUM 25 UG/1
25 TABLET ORAL
Qty: 90 TABLET | Refills: 1 | Status: SHIPPED | OUTPATIENT
Start: 2020-10-13 | End: 2021-05-11

## 2020-10-13 NOTE — TELEPHONE ENCOUNTER
Left a message to return call with patient's sister.  please schedule patient for a phone visit when she returns call, thank you!    Chica Peterson RN

## 2020-10-13 NOTE — PROGRESS NOTES
"Family Medicine Telephone Visit Note         Telephone Visit Consent   Patient was verbally read the following and verbal consent was obtained.    \"Telephone visits are billed at different rates depending on your insurance coverage. During this emergency period, for some insurers they may be billed the same as an in-person visit.  Please reach out to your insurance provider with any questions.  If during the course of the call the physician/provider feels a telephone visit is not appropriate, you will not be charged for this service.\"    Name person giving consent:  Patient   Date verbal consent given:  10/13/2020  Time verbal consent given:  2:42 PM       Chief Complaint   Patient presents with     RECHECK     medication follow up: Levothyroxine            HPI   Patients name: Gwen  Appointment start time:  3:10 PM    Has been limiting her movement due to COVID and so not interested in coming in.  Thinks she is doing OK.  When she went on the thyroid she was in the hospital for 2 months.    Staying with her sister, her mother passed away unexpectedly 1 year ago and so moved from living with her mother to living with her sister.   Is in an OCD program so that she can go home. Getting regular virtual psych care.   ROS: no GI symptoms, palpitations, tremor, weight gain or loss, hair or skin changes. MDD is stable and managed. No  changes.     Last TSH - 1/2019 at 0.53  2018 - 5+      Mercedes Delcid  Psychiatrist NP  [facility]  [address] [place] [address],       Is going to apply for disability as well.         Current Outpatient Medications   Medication Sig Dispense Refill     levothyroxine (SYNTHROID/LEVOTHROID) 25 MCG tablet Take 1 tablet (25 mcg) by mouth every morning (before breakfast) Needs to be seen. 90 tablet 0     PAXIL 40 MG tablet Take 1 tablet by mouth daily       buPROPion (WELLBUTRIN) 100 MG tablet        clonazePAM (KLONOPIN) 0.5 MG tablet Take 1 tablet (0.5 mg) by mouth daily 30 tablet " "0     valACYclovir (VALTREX) 1000 mg tablet Take 1 tablet (1,000 mg) by mouth 3 times daily for 7 days 21 tablet 0     Allergies   Allergen Reactions     Metronidazole Unknown     Abstracted data , patient cannot remember      Penicillins Unknown     Abstracted data , patient cannot remember               Review of Systems:              Physical Exam:     There were no vitals taken for this visit.  Estimated body mass index is 18.68 kg/m  as calculated from the following:    Height as of 6/6/19: 1.588 m (5' 2.5\").    Weight as of 6/6/19: 47.1 kg (103 lb 12.8 oz).    Exam:  Constitutional: healthy, alert and no distress  Psychiatric: mentation appears normal and affect normal/bright            Assessment and Plan   Gwen was seen today for recheck.    Diagnoses and all orders for this visit:    Hypothyroidism, unspecified type - reviewed with her her history of subclinical hypothyroidism and that started on synthroid due to her severe MDD and hospitalization.  Feel Ok with her continuing on this low dose but should consider a trial off after retesting her.  Hopefully can retest in 6 - 9 months when COVID is controlled.  Sent a letter to her [facility] psychiatrist about this so that she is aware.    -     levothyroxine (SYNTHROID/LEVOTHROID) 25 MCG tablet; Take 1 tablet (25 mcg) by mouth every morning (before breakfast)    Prevention - reviewed that she needs testing.     Refilled medications that would be required in the next 3 months.     After Visit Information:  Patient declined AVS     No follow-ups on file.    Appointment end time: 3:31 PM  This is a telephone visit that took 21 minutes.      [facility]      "

## 2020-10-13 NOTE — LETTER
October 13, 2020      Mercedes Delcid [facility]  [employer]  [address], [place], [place], [address]      Re:   Gwen Cash  [address]  [facility] [address]        Dear Mercedes Delcid    I am writing to summarize for you Gwen Cash's thyroid history. She was hospitalized 11/2018 with severe MDD and noted at the time to have subclinical hypothyroidism with TSH of 5.89, and T4 of 0.93. Due to her MDD, she was started on 25 mcg of synthroid daily.     Repeat TSH 1/2019 was 0.53 which is low normal.     She reports no hypo or hyperthyroid symptoms. I have not been able to retest her TSH due to COVID and her reluctance to come in for labs.      Since her treatment of her subclinical hypothyroidism was MDD related, I thought you should be aware. I will continue to treat her with 25 mcg of Synthroid and hope to test her TSH in 6 - 9 months when COVID is better controlled.  I would consider a trial off the synthroid at that point if her mental health is stable, and watch what her mood and her numbers do. If you don't think that is [place], I would appreciate knowing that and I will then continue to treat her thyroid.       Sincerely,    Cindy Bean MD

## 2020-10-13 NOTE — TELEPHONE ENCOUNTER
I will refill but can you please call her sister (number in here somewhere) to set up a phone visit since been 15 months since I have talked to her.

## 2020-10-14 NOTE — TELEPHONE ENCOUNTER
Attempted to reach patient's sister to schedule an appointment. No answer and no voicemail. After 2 attempts letter sent.    Chica Peterson RN

## 2021-05-11 DIAGNOSIS — E03.9 HYPOTHYROIDISM, UNSPECIFIED TYPE: ICD-10-CM

## 2021-05-11 RX ORDER — LEVOTHYROXINE SODIUM 25 UG/1
25 TABLET ORAL
Qty: 90 TABLET | Refills: 1 | Status: SHIPPED | OUTPATIENT
Start: 2021-05-11 | End: 2021-10-15

## 2021-10-15 DIAGNOSIS — E03.9 HYPOTHYROIDISM, UNSPECIFIED TYPE: ICD-10-CM

## 2021-10-15 RX ORDER — LEVOTHYROXINE SODIUM 25 UG/1
25 TABLET ORAL
Qty: 30 TABLET | Refills: 0 | Status: SHIPPED | OUTPATIENT
Start: 2021-10-15 | End: 2021-11-16

## 2021-10-15 NOTE — TELEPHONE ENCOUNTER
Verify that the refill encounter hasn't been started Yes    [facility] Family Medicine phone call message- patient requesting a refill:    Full Medication Name:   levothyroxine (SYNTHROID/LEVOTHROID) 25 MCG tablet 90 tablet         Dose: Sig - Route: Take 1 tablet (25 mcg) by mouth every morning (before breakfast) - Oral     Pharmacy confirmed as   [id number] - [place], [place] - [address] AT [facility] [address]  [address]  [place] [address]  Phone: [phone] Fax: [phone]  : Yes    Medication tab checked to see if medication has been sent  Yes    Additional Comments:   Patient states pharmacy trying to get ahold of clinic since Wednesday. Patient completley out of medication     OK to leave a message on voice mail? Yes    Advised patient refill may take up to 2 business days? Yes    Primary language: English      needed? No    Call taken on October 15, 2021 at 3:30 PM by Lorraine Santizo to [facility] MED REFILL

## 2021-10-15 NOTE — TELEPHONE ENCOUNTER
Patient reports they are out of levothyroxine. Has not been seen in clinic for over 1 year (last virtual visit 10/13/20). RN unable to send refill of this medication. RN routing to preceptor to refill if appropriate.     Ronna Carreno RN

## 2021-11-15 DIAGNOSIS — E03.9 HYPOTHYROIDISM, UNSPECIFIED TYPE: ICD-10-CM

## 2021-11-15 NOTE — TELEPHONE ENCOUNTER

## 2021-11-16 RX ORDER — LEVOTHYROXINE SODIUM 25 UG/1
25 TABLET ORAL
Qty: 30 TABLET | Refills: 0 | Status: SHIPPED | OUTPATIENT
Start: 2021-11-16 | End: 2021-12-16

## 2021-12-16 DIAGNOSIS — E03.9 HYPOTHYROIDISM, UNSPECIFIED TYPE: ICD-10-CM

## 2021-12-16 RX ORDER — LEVOTHYROXINE SODIUM 25 UG/1
25 TABLET ORAL
Qty: 30 TABLET | Refills: 0 | Status: SHIPPED | OUTPATIENT
Start: 2021-12-16 | End: 2022-01-21

## 2021-12-16 NOTE — TELEPHONE ENCOUNTER
Patient requesting refill of levothyroxine. Out of medication. Last office visit 10/13/20. RN unable to refill as patient has not been seen in over 1 year. Routing to PCP to refill if appropriate. Routing to FD to schedule appt.     Ronna Carreno RN

## 2021-12-16 NOTE — TELEPHONE ENCOUNTER
Hi all   Please notify her that she needs to come in since it has been more than a year.     Thanks,  Ciarra

## 2021-12-16 NOTE — LETTER
December 21, 2021    Gwen Cash  [address] [place] [id number]      Dear: Gwen Cash    You were recently referred for a follow-up appointment by your primary care provider at [facility].  We have called twice to schedule this appointment, but have been unable to reach you. Your provider is unable to refill your medication until they see you for a visit. Please call [facility] at [phone].  We would be happy to schedule this appointment for you.      Thank you.      Sincerely,    Patient Representative    [facility]  Hours:  Monday-Friday 8:00 am - 5:00 pm   Phone Number: [phone]

## 2021-12-16 NOTE — TELEPHONE ENCOUNTER
Verify that the refill encounter hasn't been started Yes    [facility] Family Medicine phone call message- patient requesting a refill:    Full Medication Name: levothyroxine (SYNTHROID/LEVOTHROID) 25 MCG tablet    Dose: Take 1 tablet (25 mcg) by mouth every morning (before breakfast) - Oral     Pharmacy confirmed as   [id number] - [address] AT [address] [address]  Phone: [phone] Fax: [phone]  : Yes    Medication tab checked to see if medication has been sent  Yes    Additional Comments: Patient is out of medication and stated pharmacy sent a request on Monday. Please advise. Patient stated she can be reached at [phone] if there are any issues.     OK to leave a message on voice mail? Yes    Advised patient refill may take up to 2 business days? Yes    Primary language: English      needed? No    Call taken on December 16, 2021 at 11:04 AM by Milena Santizo to [facility] MED REFILL

## 2021-12-21 NOTE — TELEPHONE ENCOUNTER
Called pt for 2nd attempt, no answer, left VM. Mailed a letter informing her she will need to schedule PCP f/u for med refill.     Routing to Dr. Bean for FYI.     AIDE Proctor

## 2022-01-18 DIAGNOSIS — E03.9 HYPOTHYROIDISM, UNSPECIFIED TYPE: ICD-10-CM

## 2022-01-21 RX ORDER — LEVOTHYROXINE SODIUM 25 UG/1
25 TABLET ORAL
Qty: 30 TABLET | Refills: 0 | Status: SHIPPED | OUTPATIENT
Start: 2022-01-21 | End: 2022-06-22 | Stop reason: DRUGHIGH

## 2022-03-02 DIAGNOSIS — E03.9 HYPOTHYROIDISM, UNSPECIFIED TYPE: ICD-10-CM

## 2022-03-02 RX ORDER — LEVOTHYROXINE SODIUM 25 UG/1
25 TABLET ORAL
Qty: 30 TABLET | Refills: 0 | OUTPATIENT
Start: 2022-03-02

## 2022-03-02 NOTE — TELEPHONE ENCOUNTER
"Request for medication refill: levothyroxine (SYNTHROID/LEVOTHROID) 25 MCG tablet    Providers if patient needs an appointment and you are willing to give a one month supply please refill for one month and  send a letter/MyChart using \".SMILLIMITEDREFILL\" .smillimited and route chart to \"P [facility] \" (Giving one month refill in non controlled medications is strongly recommended before denial)    If refill has been denied, meaning absolutely no refills without visit, please complete the smart phrase \".smirxrefuse\" and route it to the \"P [facility] MED REFILLS\"  pool to inform the patient and the pharmacy.    Radha Quiroz        "

## 2022-03-02 NOTE — TELEPHONE ENCOUNTER
Did not refill. Needs to be seen.  OK to not be on synthroid since we are not really sure that she needs to be on it.    Cindy Bean MD

## 2022-03-04 NOTE — TELEPHONE ENCOUNTER
Informed patient that provider would not be sending new  Prescription and need for medication would be reevaluated at virtual visit 3/16/2022. Patient had no further questions or concerns.

## 2022-06-03 ENCOUNTER — VIRTUAL VISIT (OUTPATIENT)
Dept: FAMILY MEDICINE | Facility: CLINIC | Age: 57
End: 2022-06-03
Payer: COMMERCIAL

## 2022-06-03 DIAGNOSIS — F33.2 SEVERE RECURRENT MAJOR DEPRESSION WITHOUT PSYCHOTIC FEATURES (H): ICD-10-CM

## 2022-06-03 PROBLEM — F32.A DEPRESSION: Status: RESOLVED | Noted: 2018-11-16 | Resolved: 2022-06-03

## 2022-06-03 PROCEDURE — 99442 PR PHYSICIAN TELEPHONE EVALUATION 11-20 MIN: CPT | Performed by: FAMILY MEDICINE

## 2022-06-03 ASSESSMENT — ANXIETY QUESTIONNAIRES
7. FEELING AFRAID AS IF SOMETHING AWFUL MIGHT HAPPEN: NOT AT ALL
1. FEELING NERVOUS, ANXIOUS, OR ON EDGE: NOT AT ALL
5. BEING SO RESTLESS THAT IT IS HARD TO SIT STILL: NOT AT ALL
IF YOU CHECKED OFF ANY PROBLEMS ON THIS QUESTIONNAIRE, HOW DIFFICULT HAVE THESE PROBLEMS MADE IT FOR YOU TO DO YOUR WORK, TAKE CARE OF THINGS AT HOME, OR GET ALONG WITH OTHER PEOPLE: NOT DIFFICULT AT ALL
6. BECOMING EASILY ANNOYED OR IRRITABLE: NOT AT ALL
GAD7 TOTAL SCORE: 0
GAD7 TOTAL SCORE: 0
2. NOT BEING ABLE TO STOP OR CONTROL WORRYING: NOT AT ALL
3. WORRYING TOO MUCH ABOUT DIFFERENT THINGS: NOT AT ALL

## 2022-06-03 ASSESSMENT — PATIENT HEALTH QUESTIONNAIRE - PHQ9
SUM OF ALL RESPONSES TO PHQ QUESTIONS 1-9: 0
5. POOR APPETITE OR OVEREATING: NOT AT ALL

## 2022-06-03 NOTE — PROGRESS NOTES
Gwen is a 57 year old who is being evaluated via a billable video visit.      How would you like to obtain your AVS? Mail a copy  If the video visit is dropped, the invitation should be resent by: Text to cell phone: [phone]  Will anyone else be joining your video visit?       Phone Start Time: 1:43 PM    Assessment & Plan     Severe recurrent major depression without psychotic features (H)  She was placed on synthroid in 2018 due to THS of 5.6 or so while inpatient for mental health reasons. She is now out x 6 weeks and is Ok testing to see if she really needs the med. Notes no difference (struggles to tell) on or off the med.  Recent exacerbation of her mental illness that resolved so unlikely due to her thyroid.  She gets ongoing care with a CNP.  No change in her meds in the last few years.   - TSH; Future    Prevention - long discussion about options including a visit (is not ready to come in) or lipids with her blood draw (rather just focus on thyroid) and/or colon cancer screening with FIT/Cologard (wants to do prevention later).  Plan is for her to return for a CPE as soon as she feels she can.              Tobacco Cessation:   reports that she has been smoking. She has a 15.00 pack-year smoking history. She has never used smokeless tobacco.          No follow-ups on file.    Cindy Bean MD  [facility]    Blayne Hidalgo is a 57 year old who presents for the following health issues     HPI     Chief Complaint   Patient presents with     RECHECK     Thyroid and needs a refill for thyroid med         Doing OK  Has had mental health issues that she is dealing with her CNP of many years. Is on the same meds Paxil and Klonazepam.     Has been out of Thyroid meds since 3/2022.  About 6 weeks ago. Denies any symptom changes.  Has been feeling more scared, nervous and anxious in the recent past but not sure if when on her thyroid meds or off but better now.    Has not been immunized  for COVID. Is not leaving her home at all.      Prevention -   Does not want to manage more than her thyroid today.  Including lipids and colon testing.           Review of Systems         Objective           Vitals:  No vitals were obtained today due to virtual visit.    Physical Exam             Phone-Visit Details    Type of service:  Phone Visit    Phone End Time:2:00 PM    Originating Location (pt. Location): Home    Distant Location (provider location):  [facility]     Platform used for Video Visit: Unable to complete video visit

## 2022-06-03 NOTE — PATIENT INSTRUCTIONS
Patient Education   Here is the plan from today's visit    1. Severe recurrent major depression without psychotic features (H)  To come in for a lab only visit. I will get back to you by letter.    - TSH; Future          Please call or return to clinic if your symptoms don't go away.    Follow up plan  No follow-ups on file.    Thank you for coming to [facility] today.  Lab Testing:  **If you had lab testing today and your results are reassuring or normal they will be mailed to you or sent through [employer] within 7 days.   **If the lab tests need quick action we will call you with the results.  **If you are having labs done on a different day, please call [phone] to schedule at [facility] or [phone] for other [facility] locations. Labs do not offer walk-in appointments.  The phone number we will call with results is # [phone] (home) . If this is not the best number please call our clinic and change the number.  Medication Refills:  If you need any refills please call your pharmacy and they will contact us.   If you need to  your refill at a new pharmacy, please contact the new pharmacy directly. The new pharmacy will help you get your medications transferred faster.   Scheduling:  If you have any concerns about today's visit or wish to schedule another appointment please call our office during normal business hours [phone] (8-5:00 M-F)  If a referral was made to an [facility] specialty provider and you do not get a call from central scheduling, please refer to directions on your visit summary or call our office during normal business hours for assistance.   If a Mammogram was ordered for you at the Breast Center call [phone] to schedule or change your appointment.  If you had an XRay/CT/Ultrasound/MRI ordered the number is [phone] to schedule or change your radiology appointment.   [facility] has limited ultrasound appointments available on  Wednesdays, if you would like your ultrasound at [facility], please call [phone] to schedule.   Medical Concerns:  If you have urgent medical concerns please call [phone] at any time of the day.    Cindy Bean MD

## 2022-06-22 ENCOUNTER — LAB (OUTPATIENT)
Dept: LAB | Facility: CLINIC | Age: 57
End: 2022-06-22
Payer: COMMERCIAL

## 2022-06-22 DIAGNOSIS — E03.9 HYPOTHYROIDISM, UNSPECIFIED TYPE: Primary | ICD-10-CM

## 2022-06-22 DIAGNOSIS — F33.2 SEVERE RECURRENT MAJOR DEPRESSION WITHOUT PSYCHOTIC FEATURES (H): ICD-10-CM

## 2022-06-22 LAB — TSH SERPL DL<=0.005 MIU/L-ACNC: 8.33 MU/L (ref 0.4–4)

## 2022-06-22 PROCEDURE — 84443 ASSAY THYROID STIM HORMONE: CPT

## 2022-06-22 PROCEDURE — 36415 COLL VENOUS BLD VENIPUNCTURE: CPT

## 2022-06-22 RX ORDER — LEVOTHYROXINE SODIUM 50 UG/1
50 TABLET ORAL DAILY
Qty: 90 TABLET | Refills: 0 | Status: SHIPPED | OUTPATIENT
Start: 2022-06-22 | End: 2022-12-21 | Stop reason: DRUGHIGH

## 2022-08-30 ENCOUNTER — TELEPHONE (OUTPATIENT)
Dept: FAMILY MEDICINE | Facility: CLINIC | Age: 57
End: 2022-08-30

## 2022-08-30 DIAGNOSIS — E03.9 ACQUIRED HYPOTHYROIDISM: Primary | ICD-10-CM

## 2022-08-30 NOTE — TELEPHONE ENCOUNTER
[facility] Medicine Clinic phone call message-patient reporting a symptom:     Symptom: Thyroid issues    Same Day Visit Offered: N/A    Additional comments: The patient would like a call back concerning her dosage and to have lab orders created.    OK to leave message on voice mail? Yes       Primary language: English      needed? No    Call taken on August 30, 2022 at 3:50 PM by Maria G Anthony

## 2022-08-30 NOTE — TELEPHONE ENCOUNTER
Patient wants to have her thyroid levels checked again after starting Levothyroxine on July 2. Patient stating that we never sent her a letter and that she wanted to know exact numbers. After reviewing with patient the letters sent on both June 22nd and July 1st. Patient wanting the new TSH to be placed and a call with the results.     Luisana Garcia RN

## 2022-09-01 PROBLEM — E03.9 ACQUIRED HYPOTHYROIDISM: Status: ACTIVE | Noted: 2022-09-01

## 2022-09-26 ENCOUNTER — TELEPHONE (OUTPATIENT)
Dept: FAMILY MEDICINE | Facility: CLINIC | Age: 57
End: 2022-09-26

## 2022-09-26 NOTE — TELEPHONE ENCOUNTER
[facility] Clinic phone call message- general phone call:    Reason for call: The patient will like speak with a nurse concerning her thyroid medicine.    Return call needed: Yes    OK to leave a message on voice mail? Yes    Primary language: English      needed? No    Call taken on September 26, 2022 at 3:08 PM by Maria G Anthony

## 2022-09-26 NOTE — LETTER
September 28, 2022      Gwen Cash  [address] [facility] [address]        Dear ,    We are writing to inform you of your test results.    You TSH was 4.91. We will be increasing your Levothyroxine to 75 mcg daily and repeat your TSH in 2 months. Please call the  to schedule the lab appointment [phone].        If you have any questions or concerns, please call the clinic at the number listed above.       Sincerely,        MD Luisana Anderson RN

## 2022-09-26 NOTE — TELEPHONE ENCOUNTER
Rn called patient back. Patient wanting to schedule lab only appointment. She is requesting that we call her back with the lab results. Patient is coming in tomorrow to get TSH checked as she will be out of medication on Thursday.    Luisana Garcia RN

## 2022-09-27 ENCOUNTER — LAB (OUTPATIENT)
Dept: LAB | Facility: CLINIC | Age: 57
End: 2022-09-27
Payer: COMMERCIAL

## 2022-09-27 DIAGNOSIS — E03.9 ACQUIRED HYPOTHYROIDISM: ICD-10-CM

## 2022-09-27 PROCEDURE — 84443 ASSAY THYROID STIM HORMONE: CPT

## 2022-09-27 PROCEDURE — 36415 COLL VENOUS BLD VENIPUNCTURE: CPT

## 2022-09-28 DIAGNOSIS — E03.9 ACQUIRED HYPOTHYROIDISM: Primary | ICD-10-CM

## 2022-09-28 LAB — TSH SERPL DL<=0.005 MIU/L-ACNC: 4.91 UIU/ML (ref 0.3–4.2)

## 2022-09-28 RX ORDER — LEVOTHYROXINE SODIUM 75 UG/1
75 TABLET ORAL DAILY
Qty: 90 TABLET | Refills: 0 | Status: SHIPPED | OUTPATIENT
Start: 2022-09-28 | End: 2022-12-21

## 2022-09-28 NOTE — PROGRESS NOTES
Repeat TSH not quite at goal. Will increase to 75 mcg daily and repeat TSH in 8 weeks.   Cindy Bean MD

## 2022-09-28 NOTE — PROGRESS NOTES
RN attempted to call patient back x2. When patient calls back please transfer to available RN.     Luisana Garcia RN

## 2022-09-28 NOTE — TELEPHONE ENCOUNTER
Patient called to talk about lab results. RN talked to the function of TSH and that when your TSH is higher meaning you don't have enough hormone to treat hypothyroidism. RN explained that this is the reason for the increase in medication to treat the higher TSH. Then talked about her numbers from [phone]. And her most recent numbers. Patient verbalized understanding and will schedule an appointment in 2 months.     Luisana Garcia RN

## 2022-09-28 NOTE — PROGRESS NOTES
RN attempted to call patient to let her know about medication increase of her levothyroxine. If patient calls back please transfer her to available RN.    Liusana Garcia RN

## 2022-11-12 NOTE — PROGRESS NOTES
Ptwas having a bad day and didn't want to talk. Pt drank 75% of a boost.Pt was isolative to room. Pt was observed on the phone crying     12/01/18 1430   Behavioral Health   Hallucinations denies / not responding to hallucinations   Thinking distractable;paranoid;poor concentration   Orientation other (see comment)   Memory baseline memory  (evette)   Insight poor   Judgement impaired   Eye Contact out of corner of eyes   Affect blunted, flat;tense   Mood depressed;anxious   Physical Appearance/Attire untidy;disheveled   Hygiene neglected grooming - unclean body, hair, teeth;body odor   Suicidality other (see comments)  (evette)   Self Injury other (see comment)  (evette)   Elopement (denies)   Activity isolative;withdrawn;restless   Speech other (see comments)  (doesn't want to talk)   Psychomotor / Gait balanced;steady   Sleep/Rest/Relaxation   Sleep/Rest/Relaxation appears asleep;sleeping between care;no problem identified   Nutrition   Intake (%) other (see comments)  (drank 75% of a boost)   Safety   Suicidality Status 15   Coping/Psychosocial   Verbalized Emotional State anxiety;depression;frustration;hopelessness;powerlessness   Psycho Education   Type of Intervention 1:1 intervention   Response refuses   Hours 0.5   Activities of Daily Living   Hygiene/Grooming shower;total care   Oral Hygiene total care   Dress scrubs (behavioral health)   Laundry unable to complete   Room Organization independent   Behavioral Health Interventions   Depression maintain safety precautions;monitor need to revise level of observation;maintain safe secure environment;encourage nutrition and hydration;encourage participation / independence with adls;provide emotional support   Social and Therapeutic Interventions (Depression) encourage socialization with peers;encourage participation in therapeutic groups and milieu activities      (M6) obeys commands

## 2022-12-20 DIAGNOSIS — E03.9 ACQUIRED HYPOTHYROIDISM: ICD-10-CM

## 2022-12-20 NOTE — TELEPHONE ENCOUNTER
"Last seen 6/3/2022 virtual visit  Last TSH 4.91 on 9/28/2022    Request for medication refill:  levothyroxine (SYNTHROID/LEVOTHROID) 75 MCG tablet  Providers if patient needs an appointment and you are willing to give a one month supply please refill for one month and  send a letter/MyChart using \".SMILLIMITEDREFILL\" .smillimited and route chart to \"P [facility] \" (Giving one month refill in non controlled medications is strongly recommended before denial)    If refill has been denied, meaning absolutely no refills without visit, please complete the smart phrase \".smirxrefuse\" and route it to the \"P [facility] MED REFILLS\"  pool to inform the patient and the pharmacy.    Poonam Rico RN        "

## 2022-12-21 RX ORDER — LEVOTHYROXINE SODIUM 75 UG/1
TABLET ORAL
Qty: 90 TABLET | Refills: 0 | Status: SHIPPED | OUTPATIENT
Start: 2022-12-21 | End: 2023-03-23

## 2022-12-21 NOTE — TELEPHONE ENCOUNTER
Please call her to ask she come in for TSH. We increased her dose in September and so need to make sure it is the right dose. Thanks!   Ciarra     Attempted to reach patient lmtcb    Poonam Rico RN

## 2023-03-22 ENCOUNTER — LAB (OUTPATIENT)
Dept: LAB | Facility: CLINIC | Age: 58
End: 2023-03-22
Payer: COMMERCIAL

## 2023-03-22 DIAGNOSIS — E03.9 ACQUIRED HYPOTHYROIDISM: ICD-10-CM

## 2023-03-22 LAB — TSH SERPL DL<=0.005 MIU/L-ACNC: 1.48 UIU/ML (ref 0.3–4.2)

## 2023-03-22 PROCEDURE — 84443 ASSAY THYROID STIM HORMONE: CPT

## 2023-03-22 PROCEDURE — 36415 COLL VENOUS BLD VENIPUNCTURE: CPT

## 2023-03-23 DIAGNOSIS — E03.9 ACQUIRED HYPOTHYROIDISM: ICD-10-CM

## 2023-03-23 RX ORDER — LEVOTHYROXINE SODIUM 75 UG/1
75 TABLET ORAL DAILY
Qty: 90 TABLET | Refills: 3 | Status: SHIPPED | OUTPATIENT
Start: 2023-03-23 | End: 2024-03-18

## 2024-03-18 DIAGNOSIS — E03.9 ACQUIRED HYPOTHYROIDISM: ICD-10-CM

## 2024-03-18 RX ORDER — LEVOTHYROXINE SODIUM 75 UG/1
75 TABLET ORAL DAILY
Qty: 30 TABLET | Refills: 0 | Status: SHIPPED | OUTPATIENT
Start: 2024-03-18 | End: 2024-04-18

## 2024-03-18 NOTE — TELEPHONE ENCOUNTER
"Call transferred from the .    Patient is down to her last 2 doses of levothyroxine 75mg. Last seen virtually 6/2022, last TSH 3/22/23    Patient requesting a refill.    Request for medication refill:    Providers if patient needs an appointment and you are willing to give a one month supply please refill for one month and  send a letter/MyChart using \".SMILLIMITEDREFILL\" .smillimited and route chart to \"P [facility] \" (Giving one month refill in non controlled medications is strongly recommended before denial)    If refill has been denied, meaning absolutely no refills without visit, please complete the smart phrase \".smirxrefuse\" and route it to the \"P [facility] MED REFILLS\"  pool to inform the patient and the pharmacy.    Poonam Rico RN      "

## 2024-04-16 DIAGNOSIS — E03.9 ACQUIRED HYPOTHYROIDISM: ICD-10-CM

## 2024-04-16 NOTE — TELEPHONE ENCOUNTER
"Request for medication refill:  levothyroxine (SYNTHROID/LEVOTHROID) 75 MCG tablet   Providers if patient needs an appointment and you are willing to give a one month supply please refill for one month and  send a letter/MyChart using \".SMILLIMITEDREFILL\" .smillimited and route chart to \"P [facility] \" (Giving one month refill in non controlled medications is strongly recommended before denial)    If refill has been denied, meaning absolutely no refills without visit, please complete the smart phrase \".smirxrefuse\" and route it to the \"P [facility] MED REFILLS\"  pool to inform the patient and the pharmacy.    Isabelle Gonzalez MA     "

## 2024-04-18 RX ORDER — LEVOTHYROXINE SODIUM 75 UG/1
75 TABLET ORAL DAILY
Qty: 90 TABLET | Refills: 3 | Status: SHIPPED | OUTPATIENT
Start: 2024-04-18

## 2024-08-07 ENCOUNTER — TRANSFERRED RECORDS (OUTPATIENT)
Dept: HEALTH INFORMATION MANAGEMENT | Facility: CLINIC | Age: 59
End: 2024-08-07
Payer: COMMERCIAL

## 2025-04-16 DIAGNOSIS — E03.9 ACQUIRED HYPOTHYROIDISM: Primary | ICD-10-CM

## 2025-05-01 ENCOUNTER — LAB (OUTPATIENT)
Dept: LAB | Facility: CLINIC | Age: 60
End: 2025-05-01
Payer: COMMERCIAL

## 2025-05-01 DIAGNOSIS — E03.9 ACQUIRED HYPOTHYROIDISM: ICD-10-CM

## 2025-05-01 LAB — TSH SERPL DL<=0.005 MIU/L-ACNC: 6.82 UIU/ML (ref 0.3–4.2)

## 2025-05-02 ENCOUNTER — TELEPHONE (OUTPATIENT)
Dept: FAMILY MEDICINE | Facility: CLINIC | Age: 60
End: 2025-05-02
Payer: COMMERCIAL

## 2025-05-02 DIAGNOSIS — E03.9 ACQUIRED HYPOTHYROIDISM: Primary | ICD-10-CM

## 2025-05-02 NOTE — TELEPHONE ENCOUNTER
----- Message from Cindy Vikas sent at 5/2/2025  1:22 PM CDT -----    HI all   Could you call her to find out HOW she is taking her synthroid? Her TSH is a bit higher, which indicates either that   --she is missing doses regularly or   --that she needs a higher dose.   -- or she has changed when/how she takes her medication (with tea or new meds etc) - so if that is the case we often increase the dose because we assume that she will keep doing what she has been doing now, but might not change the medications if she is sure she will go back to how she was doing things.     Sorry if confusing - get back to me with questions    Ciarra

## 2025-05-02 NOTE — TELEPHONE ENCOUNTER
Spoke with patient who reports no missed doses. Says she takes synthroid with all of her other medications around 1-2 pm. She sometimes takes earlier at 11am but is usually consistent. She has been doing this for years. Informed patient that message will be relayed to Dr. Bean as there may be a dose change with the medication. She does not want to change her dose because she just picked up 90 pills but will switch to a new dose if needed.   Inez Lopez RN

## 2025-05-06 RX ORDER — LEVOTHYROXINE SODIUM 88 UG/1
88 TABLET ORAL
Qty: 90 TABLET | Refills: 0 | Status: SHIPPED | OUTPATIENT
Start: 2025-05-06

## 2025-05-06 NOTE — TELEPHONE ENCOUNTER
Ok - will call in the next higher dose. She can start it when she runs out of the 75 mcg and then, after 2 months of the new Rx, she should come in for TSH again to make sure we have the right dose for her,     Ciarra Bean MD

## 2025-05-07 NOTE — TELEPHONE ENCOUNTER
Pt called requesting a callback to followup on lab results and medication    Phone: [phone]

## 2025-05-08 NOTE — TELEPHONE ENCOUNTER
Discussed results with patient. Patient verbalized understanding. She is going to wait to  the new bottle until she finishes the current one. She will schedule an appointment with Dr. Bean once she picks up the new bottle.   Inez Lopez RN

## 2025-07-09 NOTE — PROGRESS NOTES
[facility], [place]   Psychiatric Progress Note      Impression:   Gwen Cash is a 53 year old female admitted for severe depression and weight loss of 30 lbs.  We are adjusting medications to target mood.  We are also working with the patient on therapeutic skill building.            Diagnoses and Plan:      Principal Diagnosis: MDD, recurrent, severe without psychotic features. OCD  Unit: station 10  Attending: Santana  Medications: risks/benefits discussed with patient-    -Increase Paxil  To 30mg daily over the weekend, then on Monday, go up to 40mg daily for compulsive behaviors, anxiety, depression  -discontinue Wellbutrin 100mg BID due to worsening anxiety and affecting appetite.  -klonopin 0.5mg HS synthroid 25 mcg daily  Laboratory/Imaging:  - Upreg neg, CBC wnl and BMP WNL  Consults:  - as needed  Patient will be treated in therapeutic milieu with appropriate individual and group therapies as described.  Medical diagnoses to be addressed this admission:   Weight loss of 30 lbs     Legal Status: Voluntary     Safety Assessment:   Checks: Status 15  Precautions: Suicide  Pt has not required locked seclusion or restraints in the past 24 hours to maintain safety, please refer to RN documentation for further details.    The risks, benefits, alternatives and side effects have been discussed and are understood by the patient and other caregivers.     Target symptoms to stabilize: SI, depressed, neurovegetative symptoms, sleep issues and disordered eating  Target disposition: outpatient provider    Attestation:  Patient has been seen and evaluated by me,  Joe Dillon MD          Interim History:   The patient's care was discussed with the treatment team and chart notes were reviewed.    Pt says that after Wellbutrin was started, her appetite further went down. She says she was eating a little more than she is currently, when she was admitted. She rates her mood as 6 on a scale  "from 1-10, 10 the best, 1 the worst. She says her OCD is really bad right now, keeps straightening her room. She is worried about everything.   Writer told her that we should discontinue Wellbutrin as she is having side effects. She said it is ok to do it.   Writer recommended increase of Paxil to address OCD, depression. She was hesitant at first, but when writer said we can first go to 30mg over the weekend, then go up to 40mg, she looked a little more at ease and gave permission to do that.       The 10 point Review of Systems is negative other than noted in the HPI         Medications:       buPROPion  100 mg Oral BID     clonazePAM  0.5 mg Oral Daily     influenza vaccine adult (product based on age)  0.5 mL Intramuscular Prior to discharge     levothyroxine  25 mcg Oral QAM AC     PAXIL  20 mg Oral Daily with lunch             Allergies:     Allergies   Allergen Reactions     Metronidazole Unknown     Abstracted data , patient cannot remember      Penicillins Unknown     Abstracted data , patient cannot remember             Psychiatric Examination:   /66  Pulse 80  Temp 97.6  F (36.4  C)  Resp 16  Ht 1.575 m (5' 2\")  Wt 44.2 kg (97 lb 8 oz)  SpO2 98%  BMI 17.83 kg/m2  Weight is 97 lbs 8 oz  Body mass index is 17.83 kg/(m^2).    Appearance:  awake, alert, adequately groomed, dressed in hospital scrubs, appeared as age stated, cooperative, moderate distress and emaciated. Staying in bed, in dark room  Attitude:  cooperative  Eye Contact:  fair  Mood:  anxious and depressed  Affect:  constricted mobility  Speech:  clear, coherent  Psychomotor Behavior:  no evidence of tardive dyskinesia, dystonia, or tics and intact station, gait and muscle tone  Thought Process:  linear  Associations:  no loose associations  Thought Content:  no evidence of psychotic thought, passive suicidal ideation present, no auditory hallucinations present and no visual hallucinations present  Insight:  limited  Judgment:  " limited  Oriented to:  time, person, and place  Attention Span and Concentration:  limited  Recent and Remote Memory:  fair  Language: Able to name objects  Fund of Knowledge: appropriate  Muscle Strength and Tone: normal  Gait and Station: Normal         Labs:   No results found for this or any previous visit (from the past 24 hour(s)).   no

## (undated) RX ORDER — CAFFEINE AND SODIUM BENZOATE 125 MG/ML
INJECTION, SOLUTION INTRAMUSCULAR; INTRAVENOUS
Status: DISPENSED
Start: 2019-01-02

## (undated) RX ORDER — METHOHEXITAL IN WATER/PF 100MG/10ML
SYRINGE (ML) INTRAVENOUS
Status: DISPENSED
Start: 2018-12-28

## (undated) RX ORDER — LIDOCAINE HYDROCHLORIDE 20 MG/ML
INJECTION, SOLUTION EPIDURAL; INFILTRATION; INTRACAUDAL; PERINEURAL
Status: DISPENSED
Start: 2018-12-17

## (undated) RX ORDER — LIDOCAINE HYDROCHLORIDE 20 MG/ML
INJECTION, SOLUTION EPIDURAL; INFILTRATION; INTRACAUDAL; PERINEURAL
Status: DISPENSED
Start: 2018-12-21

## (undated) RX ORDER — CAFFEINE AND SODIUM BENZOATE 125 MG/ML
INJECTION, SOLUTION INTRAMUSCULAR; INTRAVENOUS
Status: DISPENSED
Start: 2019-01-04

## (undated) RX ORDER — METHOHEXITAL IN WATER/PF 100MG/10ML
SYRINGE (ML) INTRAVENOUS
Status: DISPENSED
Start: 2018-12-17

## (undated) RX ORDER — METHOHEXITAL IN WATER/PF 100MG/10ML
SYRINGE (ML) INTRAVENOUS
Status: DISPENSED
Start: 2018-12-12

## (undated) RX ORDER — CAFFEINE AND SODIUM BENZOATE 125 MG/ML
INJECTION, SOLUTION INTRAMUSCULAR; INTRAVENOUS
Status: DISPENSED
Start: 2018-12-28

## (undated) RX ORDER — LIDOCAINE HYDROCHLORIDE 20 MG/ML
INJECTION, SOLUTION EPIDURAL; INFILTRATION; INTRACAUDAL; PERINEURAL
Status: DISPENSED
Start: 2019-01-02

## (undated) RX ORDER — LIDOCAINE HYDROCHLORIDE 20 MG/ML
INJECTION, SOLUTION EPIDURAL; INFILTRATION; INTRACAUDAL; PERINEURAL
Status: DISPENSED
Start: 2019-01-07

## (undated) RX ORDER — METHOHEXITAL IN WATER/PF 100MG/10ML
SYRINGE (ML) INTRAVENOUS
Status: DISPENSED
Start: 2018-12-07

## (undated) RX ORDER — LIDOCAINE HYDROCHLORIDE 20 MG/ML
INJECTION, SOLUTION EPIDURAL; INFILTRATION; INTRACAUDAL; PERINEURAL
Status: DISPENSED
Start: 2018-12-12

## (undated) RX ORDER — CAFFEINE AND SODIUM BENZOATE 125 MG/ML
INJECTION, SOLUTION INTRAMUSCULAR; INTRAVENOUS
Status: DISPENSED
Start: 2019-01-07

## (undated) RX ORDER — CAFFEINE AND SODIUM BENZOATE 125 MG/ML
INJECTION, SOLUTION INTRAMUSCULAR; INTRAVENOUS
Status: DISPENSED
Start: 2018-12-26

## (undated) RX ORDER — METHOHEXITAL IN WATER/PF 100MG/10ML
SYRINGE (ML) INTRAVENOUS
Status: DISPENSED
Start: 2019-01-07

## (undated) RX ORDER — METHOHEXITAL IN WATER/PF 100MG/10ML
SYRINGE (ML) INTRAVENOUS
Status: DISPENSED
Start: 2019-01-02

## (undated) RX ORDER — METHOHEXITAL IN WATER/PF 100MG/10ML
SYRINGE (ML) INTRAVENOUS
Status: DISPENSED
Start: 2018-12-19

## (undated) RX ORDER — METHOHEXITAL IN WATER/PF 100MG/10ML
SYRINGE (ML) INTRAVENOUS
Status: DISPENSED
Start: 2018-12-26

## (undated) RX ORDER — METHOHEXITAL IN WATER/PF 100MG/10ML
SYRINGE (ML) INTRAVENOUS
Status: DISPENSED
Start: 2019-01-04

## (undated) RX ORDER — LIDOCAINE HYDROCHLORIDE 20 MG/ML
INJECTION, SOLUTION EPIDURAL; INFILTRATION; INTRACAUDAL; PERINEURAL
Status: DISPENSED
Start: 2018-12-28

## (undated) RX ORDER — METHOHEXITAL IN WATER/PF 100MG/10ML
SYRINGE (ML) INTRAVENOUS
Status: DISPENSED
Start: 2018-12-21

## (undated) RX ORDER — LIDOCAINE HYDROCHLORIDE 20 MG/ML
INJECTION, SOLUTION EPIDURAL; INFILTRATION; INTRACAUDAL; PERINEURAL
Status: DISPENSED
Start: 2019-01-04

## (undated) RX ORDER — METHOHEXITAL IN WATER/PF 100MG/10ML
SYRINGE (ML) INTRAVENOUS
Status: DISPENSED
Start: 2018-12-10

## (undated) RX ORDER — LIDOCAINE HYDROCHLORIDE 20 MG/ML
INJECTION, SOLUTION EPIDURAL; INFILTRATION; INTRACAUDAL; PERINEURAL
Status: DISPENSED
Start: 2018-12-19

## (undated) RX ORDER — LIDOCAINE HYDROCHLORIDE 20 MG/ML
INJECTION, SOLUTION EPIDURAL; INFILTRATION; INTRACAUDAL; PERINEURAL
Status: DISPENSED
Start: 2018-12-26